# Patient Record
Sex: MALE | Race: WHITE | NOT HISPANIC OR LATINO | URBAN - METROPOLITAN AREA
[De-identification: names, ages, dates, MRNs, and addresses within clinical notes are randomized per-mention and may not be internally consistent; named-entity substitution may affect disease eponyms.]

---

## 2017-06-13 ENCOUNTER — EMERGENCY (EMERGENCY)
Facility: HOSPITAL | Age: 67
LOS: 1 days | Discharge: HOME | End: 2017-06-13
Attending: EMERGENCY MEDICINE | Admitting: EMERGENCY MEDICINE
Payer: MEDICARE

## 2017-06-13 VITALS
DIASTOLIC BLOOD PRESSURE: 78 MMHG | OXYGEN SATURATION: 99 % | SYSTOLIC BLOOD PRESSURE: 124 MMHG | HEART RATE: 70 BPM | TEMPERATURE: 98 F | RESPIRATION RATE: 18 BRPM

## 2017-06-13 LAB
ALBUMIN SERPL ELPH-MCNC: 4.2 G/DL — SIGNIFICANT CHANGE UP (ref 3.3–5)
ALP SERPL-CCNC: 78 U/L — SIGNIFICANT CHANGE UP (ref 40–120)
ALT FLD-CCNC: 14 U/L RC — SIGNIFICANT CHANGE UP (ref 10–45)
ANION GAP SERPL CALC-SCNC: 17 MMOL/L — SIGNIFICANT CHANGE UP (ref 5–17)
AST SERPL-CCNC: 19 U/L — SIGNIFICANT CHANGE UP (ref 10–40)
BASE EXCESS BLDV CALC-SCNC: 5.1 MMOL/L — HIGH (ref -2–2)
BILIRUB SERPL-MCNC: 0.6 MG/DL — SIGNIFICANT CHANGE UP (ref 0.2–1.2)
BUN SERPL-MCNC: 15 MG/DL — SIGNIFICANT CHANGE UP (ref 7–23)
CA-I SERPL-SCNC: 1.2 MMOL/L — SIGNIFICANT CHANGE UP (ref 1.12–1.3)
CALCIUM SERPL-MCNC: 9.7 MG/DL — SIGNIFICANT CHANGE UP (ref 8.4–10.5)
CHLORIDE BLDV-SCNC: 101 MMOL/L — SIGNIFICANT CHANGE UP (ref 96–108)
CHLORIDE SERPL-SCNC: 99 MMOL/L — SIGNIFICANT CHANGE UP (ref 96–108)
CK MB CFR SERPL CALC: 1.6 NG/ML — SIGNIFICANT CHANGE UP (ref 0–6.7)
CK SERPL-CCNC: 73 U/L — SIGNIFICANT CHANGE UP (ref 30–200)
CO2 BLDV-SCNC: 29 MMOL/L — SIGNIFICANT CHANGE UP (ref 22–30)
CO2 SERPL-SCNC: 24 MMOL/L — SIGNIFICANT CHANGE UP (ref 22–31)
CREAT SERPL-MCNC: 0.93 MG/DL — SIGNIFICANT CHANGE UP (ref 0.5–1.3)
GAS PNL BLDV: 136 MMOL/L — SIGNIFICANT CHANGE UP (ref 136–145)
GAS PNL BLDV: SIGNIFICANT CHANGE UP
GLUCOSE BLDV-MCNC: 108 MG/DL — HIGH (ref 70–99)
GLUCOSE SERPL-MCNC: 113 MG/DL — HIGH (ref 70–99)
HCO3 BLDV-SCNC: 28 MMOL/L — SIGNIFICANT CHANGE UP (ref 21–29)
HCT VFR BLD CALC: 42.2 % — SIGNIFICANT CHANGE UP (ref 39–50)
HCT VFR BLDA CALC: 44 % — SIGNIFICANT CHANGE UP (ref 39–50)
HGB BLD CALC-MCNC: 14.3 G/DL — SIGNIFICANT CHANGE UP (ref 13–17)
HGB BLD-MCNC: 14.5 G/DL — SIGNIFICANT CHANGE UP (ref 13–17)
LACTATE BLDV-MCNC: 2 MMOL/L — SIGNIFICANT CHANGE UP (ref 0.7–2)
MCHC RBC-ENTMCNC: 33.1 PG — SIGNIFICANT CHANGE UP (ref 27–34)
MCHC RBC-ENTMCNC: 34.3 GM/DL — SIGNIFICANT CHANGE UP (ref 32–36)
MCV RBC AUTO: 96.7 FL — SIGNIFICANT CHANGE UP (ref 80–100)
NT-PROBNP SERPL-SCNC: 252 PG/ML — SIGNIFICANT CHANGE UP (ref 0–300)
PCO2 BLDV: 36 MMHG — SIGNIFICANT CHANGE UP (ref 35–50)
PH BLDV: 7.5 — HIGH (ref 7.35–7.45)
PLATELET # BLD AUTO: 279 K/UL — SIGNIFICANT CHANGE UP (ref 150–400)
PO2 BLDV: 54 MMHG — HIGH (ref 25–45)
POTASSIUM BLDV-SCNC: 3.6 MMOL/L — SIGNIFICANT CHANGE UP (ref 3.5–5)
POTASSIUM SERPL-MCNC: 3.9 MMOL/L — SIGNIFICANT CHANGE UP (ref 3.5–5.3)
POTASSIUM SERPL-SCNC: 3.9 MMOL/L — SIGNIFICANT CHANGE UP (ref 3.5–5.3)
PROT SERPL-MCNC: 7.6 G/DL — SIGNIFICANT CHANGE UP (ref 6–8.3)
RBC # BLD: 4.37 M/UL — SIGNIFICANT CHANGE UP (ref 4.2–5.8)
RBC # FLD: 11.5 % — SIGNIFICANT CHANGE UP (ref 10.3–14.5)
SAO2 % BLDV: 90 % — HIGH (ref 67–88)
SODIUM SERPL-SCNC: 140 MMOL/L — SIGNIFICANT CHANGE UP (ref 135–145)
TROPONIN T SERPL-MCNC: <0.01 NG/ML — SIGNIFICANT CHANGE UP (ref 0–0.06)
TROPONIN T SERPL-MCNC: <0.01 NG/ML — SIGNIFICANT CHANGE UP (ref 0–0.06)
WBC # BLD: 10.2 K/UL — SIGNIFICANT CHANGE UP (ref 3.8–10.5)
WBC # FLD AUTO: 10.2 K/UL — SIGNIFICANT CHANGE UP (ref 3.8–10.5)

## 2017-06-13 PROCEDURE — 99220: CPT | Mod: 25

## 2017-06-13 PROCEDURE — 71020: CPT | Mod: 26

## 2017-06-13 PROCEDURE — 93010 ELECTROCARDIOGRAM REPORT: CPT

## 2017-06-13 RX ORDER — IPRATROPIUM/ALBUTEROL SULFATE 18-103MCG
3 AEROSOL WITH ADAPTER (GRAM) INHALATION EVERY 6 HOURS
Qty: 0 | Refills: 0 | Status: DISCONTINUED | OUTPATIENT
Start: 2017-06-13 | End: 2017-06-17

## 2017-06-13 RX ORDER — IPRATROPIUM/ALBUTEROL SULFATE 18-103MCG
3 AEROSOL WITH ADAPTER (GRAM) INHALATION ONCE
Qty: 0 | Refills: 0 | Status: COMPLETED | OUTPATIENT
Start: 2017-06-13 | End: 2017-06-13

## 2017-06-13 RX ORDER — ASPIRIN/CALCIUM CARB/MAGNESIUM 324 MG
325 TABLET ORAL ONCE
Qty: 0 | Refills: 0 | Status: COMPLETED | OUTPATIENT
Start: 2017-06-13 | End: 2017-06-13

## 2017-06-13 RX ADMIN — Medication 325 MILLIGRAM(S): at 16:36

## 2017-06-13 RX ADMIN — Medication 1 MILLIGRAM(S): at 15:24

## 2017-06-13 RX ADMIN — Medication 50 MILLIGRAM(S): at 13:42

## 2017-06-13 RX ADMIN — Medication 3 MILLILITER(S): at 11:38

## 2017-06-13 NOTE — ED CDU PROVIDER NOTE - PROGRESS NOTE DETAILS
spoke to Dr. Elliot Pantoja- ok with plan with cdu, tele monitoring, serial CE and echo and outpt follow up this week in office. -RITA Rapp CDU NOTE PA Tanya: pt resting comfortably, feels well without complaint. no dyspnea/sob/cp. NAD VSS. no events on tele. CDU NOTE RITA Martínez: pt asleep. NAD VSS. no events on tele. CDU NOTE RITA Martínez: pt resting comfortably, feels well without complaint. NAD VSS. no events on tele. Pt comfortable. No complaints. Vital signs stable. Will continue to observe and reassess. Awaiting  echo. -Vanessa Robb PA-C Pt comfortable. No complaints. Vital signs stable.   echo normal. Will plan for d/c home. d/w Dr. Martin. -Vanessa Robb PA-C I have personally performed a face to face diagnostic evaluation on this patient.  I have reviewed the ACP note and agree with the history, exam, and plan of care, except as noted.  History and Exam by me shows   pt feeling better with no SOB at this time.  ECHO - normal.  Pt advised to use ProAir QID and take steroid next 48 hours along with Breo.  Pt also advised to f/u with PMD in the next week.  Pt stable for D/C.

## 2017-06-13 NOTE — ED ADULT NURSE NOTE - OBJECTIVE STATEMENT
Pt presents to the ER with SOB.  No SOB now.  PT has a hx of COPD.  Lung sounds clear. pt who has copd came in with some diff breathing and wheezing. pt is also very anxious. on assessment a and o x 3 lungs clear abd soft non tender no swelling in extremities no n/v/d/ no fevers, trying to do breathing treatments but gets anxious, no other complaints

## 2017-06-13 NOTE — ED CDU PROVIDER NOTE - PLAN OF CARE
1. Stay hydrated.  2. Continue Current Home Medications. Use Albuterol nebulizer every 4-6hrs for difficulty breathing as needed. Take Prednisone 50mg daily for 4 days.   3. Follow up with your PCP Dr. Elliot Pantoja and your Pulmonologist Dr. Tree Amaro in 1-2 days (Bring printed results to your doctor visit).  4. Return if symptoms, worsen, fever, weakness, chest pain, difficulty breathing, dizziness and all other concerns. 1. Stay hydrated.  2. Continue Current Home Medications. Take Prednisone 50mg daily for 4 days. Continue your current home medications.  3. Follow up with your PCP Dr. Elliot Pantoja and your Pulmonologist Dr. Tree Amaro in 2-3 days (Bring printed results to your doctor visit).  4. Return to the ED if symptoms worsen, or if you develop any fever, weakness, chest pain, difficulty breathing, dizziness, or any other concerns. 1. Stay hydrated.  2. Continue Current Home Medications. Take Prednisone 50mg daily for 2 more days. Continue your current home medications.  3. Follow up with your PCP Dr. Elliot Pantoja and your Pulmonologist Dr. Tree Amaro in 2-3 days (Bring printed results to your doctor visit).  4. Return to the ED if symptoms worsen, or if you develop any fever, weakness, chest pain, difficulty breathing, dizziness, or any other concerns.

## 2017-06-13 NOTE — ED ADULT NURSE REASSESSMENT NOTE - NS ED NURSE REASSESS COMMENT FT1
report taken from Bertha MAGANA
Pt received from CHINO Cobian. Pt oriented to CDU & plan of care was discussed. No complaints of chest pain, SOB, dizziness or palpitations. Pt denies any SOB or difficulty breathing. Bilateral lung sounds clear. Pt states he feels better. Safety & comfort measures maintained. Call bell in reach. Will continue to monitor.

## 2017-06-13 NOTE — ED CDU PROVIDER NOTE - MEDICAL DECISION MAKING DETAILS
TELE  -[facility]  -[facility] EVAL  -ECHO  -SEND HOME WITH STEROIDS FOR COPD IF CARDIAC W/U NEG

## 2017-06-13 NOTE — ED CDU PROVIDER NOTE - OBJECTIVE STATEMENT
65 yo male wit PMH of HTN, herniated disks, radiculopathy, COPD on Brio and Proair, not on O2, no hospitalization, steroids or intubations presents with 2 dyas of SOB, orthopnea, mild le edema and HEALY. No hx of chf. No URI sx. No travel, sick cotnacts, abx. No cp. No Bleeeding from anywhere or hx of anemia. Former smoker.

## 2017-06-13 NOTE — ED PROVIDER NOTE - PMH
COPD (chronic obstructive pulmonary disease)    HLD (hyperlipidemia)    HTN (hypertension)    Spondylolisthesis

## 2017-06-13 NOTE — ED PROVIDER NOTE - OBJECTIVE STATEMENT
67 yo male wit PMH of HTN, herniated disks, radiculopathy, COPD on Brio and Proair, not on O2, no hospitalization, steroids or intubations presents with 2 dyas of SOB, orthopnea, mild le edema and HEALY. No hx of chf. No URI sx. No travel, sick cotnacts, abx. No cp. No Bleeeding from anywhere or hx of anemia. Former smoker. On exam, AVSS, secreased BS on left side, no wheezing, s1, s2, rrr, no jvd, s1,s 2, soft, nt nd no r/g/r, Mild ankle edema, no calf ttp.

## 2017-06-14 VITALS
SYSTOLIC BLOOD PRESSURE: 124 MMHG | OXYGEN SATURATION: 96 % | DIASTOLIC BLOOD PRESSURE: 86 MMHG | HEART RATE: 67 BPM | TEMPERATURE: 98 F | RESPIRATION RATE: 17 BRPM

## 2017-06-14 PROCEDURE — 99284 EMERGENCY DEPT VISIT MOD MDM: CPT | Mod: 25

## 2017-06-14 PROCEDURE — 82550 ASSAY OF CK (CPK): CPT

## 2017-06-14 PROCEDURE — 93306 TTE W/DOPPLER COMPLETE: CPT | Mod: 26

## 2017-06-14 PROCEDURE — G0378: CPT

## 2017-06-14 PROCEDURE — 82435 ASSAY OF BLOOD CHLORIDE: CPT

## 2017-06-14 PROCEDURE — 94640 AIRWAY INHALATION TREATMENT: CPT

## 2017-06-14 PROCEDURE — 93306 TTE W/DOPPLER COMPLETE: CPT

## 2017-06-14 PROCEDURE — 85027 COMPLETE CBC AUTOMATED: CPT

## 2017-06-14 PROCEDURE — 84295 ASSAY OF SERUM SODIUM: CPT

## 2017-06-14 PROCEDURE — 82553 CREATINE MB FRACTION: CPT

## 2017-06-14 PROCEDURE — 82803 BLOOD GASES ANY COMBINATION: CPT

## 2017-06-14 PROCEDURE — 93005 ELECTROCARDIOGRAM TRACING: CPT

## 2017-06-14 PROCEDURE — 82330 ASSAY OF CALCIUM: CPT

## 2017-06-14 PROCEDURE — 82947 ASSAY GLUCOSE BLOOD QUANT: CPT

## 2017-06-14 PROCEDURE — 84484 ASSAY OF TROPONIN QUANT: CPT

## 2017-06-14 PROCEDURE — 71046 X-RAY EXAM CHEST 2 VIEWS: CPT

## 2017-06-14 PROCEDURE — 85014 HEMATOCRIT: CPT

## 2017-06-14 PROCEDURE — 83605 ASSAY OF LACTIC ACID: CPT

## 2017-06-14 PROCEDURE — 83880 ASSAY OF NATRIURETIC PEPTIDE: CPT

## 2017-06-14 PROCEDURE — 99217: CPT

## 2017-06-14 PROCEDURE — 80053 COMPREHEN METABOLIC PANEL: CPT

## 2017-06-14 PROCEDURE — 84132 ASSAY OF SERUM POTASSIUM: CPT

## 2017-06-14 RX ADMIN — Medication 50 MILLIGRAM(S): at 11:54

## 2017-09-20 ENCOUNTER — APPOINTMENT (OUTPATIENT)
Dept: SURGERY | Facility: CLINIC | Age: 67
End: 2017-09-20
Payer: MEDICARE

## 2017-09-20 VITALS
RESPIRATION RATE: 16 BRPM | OXYGEN SATURATION: 99 % | HEIGHT: 68.5 IN | TEMPERATURE: 97.9 F | DIASTOLIC BLOOD PRESSURE: 91 MMHG | WEIGHT: 120 LBS | HEART RATE: 61 BPM | BODY MASS INDEX: 17.98 KG/M2 | SYSTOLIC BLOOD PRESSURE: 150 MMHG

## 2017-09-20 DIAGNOSIS — K40.90 UNILATERAL INGUINAL HERNIA, W/OUT OBSTRUCTION OR GANGRENE, NOT SPECIFIED AS RECURRENT: ICD-10-CM

## 2017-09-20 DIAGNOSIS — Z87.19 PERSONAL HISTORY OF OTHER DISEASES OF THE DIGESTIVE SYSTEM: ICD-10-CM

## 2017-09-20 DIAGNOSIS — T14.8 OTHER INJURY OF UNSPECIFIED BODY REGION: ICD-10-CM

## 2017-09-20 PROCEDURE — 99205 OFFICE O/P NEW HI 60 MIN: CPT

## 2017-09-20 RX ORDER — UMECLIDINIUM BROMIDE AND VILANTEROL TRIFENATATE 62.5; 25 UG/1; UG/1
62.5-25 POWDER RESPIRATORY (INHALATION)
Refills: 0 | Status: ACTIVE | COMMUNITY

## 2017-11-15 ENCOUNTER — EMERGENCY (EMERGENCY)
Facility: HOSPITAL | Age: 67
LOS: 1 days | Discharge: HOME | End: 2017-11-15
Attending: EMERGENCY MEDICINE | Admitting: EMERGENCY MEDICINE
Payer: MEDICARE

## 2017-11-15 VITALS
RESPIRATION RATE: 17 BRPM | TEMPERATURE: 98 F | DIASTOLIC BLOOD PRESSURE: 105 MMHG | HEART RATE: 70 BPM | SYSTOLIC BLOOD PRESSURE: 189 MMHG | OXYGEN SATURATION: 96 %

## 2017-11-15 VITALS
DIASTOLIC BLOOD PRESSURE: 99 MMHG | RESPIRATION RATE: 19 BRPM | OXYGEN SATURATION: 99 % | SYSTOLIC BLOOD PRESSURE: 140 MMHG | HEART RATE: 65 BPM

## 2017-11-15 PROCEDURE — 99284 EMERGENCY DEPT VISIT MOD MDM: CPT

## 2017-11-15 PROCEDURE — 99283 EMERGENCY DEPT VISIT LOW MDM: CPT

## 2017-11-15 RX ORDER — HYDROCHLOROTHIAZIDE 25 MG
25 TABLET ORAL ONCE
Qty: 0 | Refills: 0 | Status: COMPLETED | OUTPATIENT
Start: 2017-11-15 | End: 2017-11-15

## 2017-11-15 RX ORDER — ACETAMINOPHEN 500 MG
650 TABLET ORAL ONCE
Qty: 0 | Refills: 0 | Status: COMPLETED | OUTPATIENT
Start: 2017-11-15 | End: 2017-11-15

## 2017-11-15 RX ADMIN — Medication 25 MILLIGRAM(S): at 16:20

## 2017-11-15 NOTE — ED ADULT NURSE NOTE - NS ED NURSE RECORD ANOTHER HT AND WT
Pt states she's felt sick all week with symptoms consisting of weakness and dizziness and nausea. Pt denies chest pain. Pt had dry heaves earlier in the week, no emesis. Pt states 1400 today, she began with numbness across her entire chest.  Again, denies chest pain, denies shortness of breath. States she's lightheaded.
No

## 2017-11-15 NOTE — ED PROVIDER NOTE - PLAN OF CARE
Please start taking the blood pressure pill hydrochlorothiazide as prescribed.     Please follow up with your regular doctor in 1 week. Return to the ED if you develop severe chest pain, headache, trouble breathing or changes in urinary habits.

## 2017-11-15 NOTE — ED PROVIDER NOTE - MEDICAL DECISION MAKING DETAILS
Attending MD Antonio: 66M with HTN and COPD presenting with hypertension, SBPs to 180s/100s, no symptoms. No s/s malignant hypertension in this patient, will discuss case with patient's PMD, check BMP for renal function and likely discharge

## 2017-11-15 NOTE — ED PROVIDER NOTE - CARE PLAN
Principal Discharge DX:	Essential hypertension Principal Discharge DX:	Essential hypertension  Instructions for follow-up, activity and diet:	Please start taking the blood pressure pill hydrochlorothiazide as prescribed.     Please follow up with your regular doctor in 1 week. Return to the ED if you develop severe chest pain, headache, trouble breathing or changes in urinary habits.

## 2017-11-15 NOTE — ED ADULT NURSE NOTE - OBJECTIVE STATEMENT
Patient   is  alert  and  oriented x3.  Color is good  and  skin warm to touch.  He  denies  headache , nausea  or  dizziness.

## 2017-11-15 NOTE — ED PROVIDER NOTE - MUSCULOSKELETAL, MLM
Spine appears normal, range of motion is not limited, no muscle or joint tenderness. No peripheral edema.

## 2017-11-15 NOTE — ED PROVIDER NOTE - PROGRESS NOTE DETAILS
Attending MD Antonio: discussed case with Dr. Kay, he recommends starting HCTZ 25mg qd and we will have patient follow up in 1 week for BP recheck

## 2017-11-15 NOTE — ED PROVIDER NOTE - OBJECTIVE STATEMENT
66 year old male with pmhx of COPD, hyperlipidemia, Hypertension, spondylolisthesis presents with high BP at PMD's office. Was instructed to monitor BP at home and was in the 180/100's, after taking dose of Amlodipine 10mg and sent to ER. No chest pain or tightness. No headaches. Denies any other symptoms at this time.  PMD- Cj Kay

## 2019-01-19 ENCOUNTER — TRANSCRIPTION ENCOUNTER (OUTPATIENT)
Age: 69
End: 2019-01-19

## 2019-01-19 ENCOUNTER — OUTPATIENT (OUTPATIENT)
Dept: OUTPATIENT SERVICES | Facility: HOSPITAL | Age: 69
LOS: 1 days | Discharge: HOME | End: 2019-01-19

## 2019-01-19 DIAGNOSIS — C34.90 MALIGNANT NEOPLASM OF UNSPECIFIED PART OF UNSPECIFIED BRONCHUS OR LUNG: ICD-10-CM

## 2019-01-19 PROBLEM — M43.10 SPONDYLOLISTHESIS, SITE UNSPECIFIED: Chronic | Status: ACTIVE | Noted: 2017-06-13

## 2019-01-19 PROBLEM — E78.5 HYPERLIPIDEMIA, UNSPECIFIED: Chronic | Status: ACTIVE | Noted: 2017-06-13

## 2019-01-19 PROBLEM — J44.9 CHRONIC OBSTRUCTIVE PULMONARY DISEASE, UNSPECIFIED: Chronic | Status: ACTIVE | Noted: 2017-06-13

## 2019-01-19 PROBLEM — I10 ESSENTIAL (PRIMARY) HYPERTENSION: Chronic | Status: ACTIVE | Noted: 2017-06-13

## 2019-01-23 ENCOUNTER — RESULT REVIEW (OUTPATIENT)
Age: 69
End: 2019-01-23

## 2019-01-23 ENCOUNTER — APPOINTMENT (OUTPATIENT)
Dept: HEMATOLOGY ONCOLOGY | Facility: CLINIC | Age: 69
End: 2019-01-23
Payer: MEDICARE

## 2019-01-23 DIAGNOSIS — Z87.891 PERSONAL HISTORY OF NICOTINE DEPENDENCE: ICD-10-CM

## 2019-01-23 DIAGNOSIS — F12.90 CANNABIS USE, UNSPECIFIED, UNCOMPLICATED: ICD-10-CM

## 2019-01-23 LAB
BASOPHILS # BLD AUTO: 0.1 K/UL — SIGNIFICANT CHANGE UP (ref 0–0.2)
BASOPHILS NFR BLD AUTO: 0.7 % — SIGNIFICANT CHANGE UP (ref 0–2)
EOSINOPHIL # BLD AUTO: 0.3 K/UL — SIGNIFICANT CHANGE UP (ref 0–0.5)
EOSINOPHIL NFR BLD AUTO: 2.4 % — SIGNIFICANT CHANGE UP (ref 0–6)
HCT VFR BLD CALC: 44.8 % — SIGNIFICANT CHANGE UP (ref 39–50)
HGB BLD-MCNC: 14.5 G/DL — SIGNIFICANT CHANGE UP (ref 13–17)
LYMPHOCYTES # BLD AUTO: 2.2 K/UL — SIGNIFICANT CHANGE UP (ref 1–3.3)
LYMPHOCYTES # BLD AUTO: 20.5 % — SIGNIFICANT CHANGE UP (ref 13–44)
MCHC RBC-ENTMCNC: 30.8 PG — SIGNIFICANT CHANGE UP (ref 27–34)
MCHC RBC-ENTMCNC: 32.5 G/DL — SIGNIFICANT CHANGE UP (ref 32–36)
MCV RBC AUTO: 94.7 FL — SIGNIFICANT CHANGE UP (ref 80–100)
MONOCYTES # BLD AUTO: 1 K/UL — HIGH (ref 0–0.9)
MONOCYTES NFR BLD AUTO: 9.1 % — SIGNIFICANT CHANGE UP (ref 2–14)
NEUTROPHILS # BLD AUTO: 7.4 K/UL — SIGNIFICANT CHANGE UP (ref 1.8–7.4)
NEUTROPHILS NFR BLD AUTO: 67.3 % — SIGNIFICANT CHANGE UP (ref 43–77)
PLATELET # BLD AUTO: 455 K/UL — HIGH (ref 150–400)
RBC # BLD: 4.73 M/UL — SIGNIFICANT CHANGE UP (ref 4.2–5.8)
RBC # FLD: 11.6 % — SIGNIFICANT CHANGE UP (ref 10.3–14.5)
WBC # BLD: 11 K/UL — HIGH (ref 3.8–10.5)
WBC # FLD AUTO: 11 K/UL — HIGH (ref 3.8–10.5)

## 2019-01-23 PROCEDURE — 99205 OFFICE O/P NEW HI 60 MIN: CPT

## 2019-01-23 NOTE — REVIEW OF SYSTEMS
[Fatigue] : fatigue [Recent Change In Weight] : ~T recent weight change [Nosebleeds] : nosebleeds [Lower Ext Edema] : lower extremity edema [Shortness Of Breath] : shortness of breath [Cough] : cough [Negative] : Allergic/Immunologic

## 2019-01-23 NOTE — ASSESSMENT
[FreeTextEntry1] : 67 yo with newly discovered lung mass\par Need PET/CT \par Had recent MRI- no symptoms\par Will refer to pulm for bronch and bx.\par Labs today including PT/PTT\par OV in 3 weeks

## 2019-01-23 NOTE — CONSULT LETTER
[Dear  ___] : Dear  [unfilled], [Consult Letter:] : I had the pleasure of evaluating your patient, [unfilled]. [Please see my note below.] : Please see my note below. [Consult Closing:] : Thank you very much for allowing me to participate in the care of this patient.  If you have any questions, please do not hesitate to contact me. [Sincerely,] : Sincerely, [FreeTextEntry2] : Dr. Michael Soto [FreeTextEntry3] : Milka Rodgers MD\par \par  [DrSharri  ___] : Dr. CHESTER [DrSharri ___] : Dr. CHESTER

## 2019-01-23 NOTE — PHYSICAL EXAM
[Restricted in physically strenuous activity but ambulatory and able to carry out work of a light or sedentary nature] : Status 1- Restricted in physically strenuous activity but ambulatory and able to carry out work of a light or sedentary nature, e.g., light house work, office work [Thin] : thin [Normal] : affect appropriate [de-identified] : ecchymoses at IV site, hyperpigmented lesion on lower lip.

## 2019-01-23 NOTE — HISTORY OF PRESENT ILLNESS
[de-identified] : Mr. Soto is a pleasant 69 yo m with recently discovered lung mass here for consult visit. \par Pt who is very active, prior athlete, ex-, with hx of anxiety, HTN, ex-smoker (quit 2016), prior marijuana use, some occupational exposure to toxic chemicals, developed dyspnea in 2016, was diagnosed with COPD and is under the care of pulmonologists. Around end of September, developed hoarseness of voice which was thought to be laryngitis- was sent to ENT by domestic partner who is a speech pathologists. He was given medrol pack which did not help and then referred for CT chest which showed left upper lobe mass measuring 6.6 cm as well as hilar/mediastinal adenopathy and b/l nodules consistent with mets. He also had MRI brain without any contrast in November done for headache showed microvascular changes. Headaches have since resolved.

## 2019-01-25 ENCOUNTER — OTHER (OUTPATIENT)
Age: 69
End: 2019-01-25

## 2019-01-28 ENCOUNTER — APPOINTMENT (OUTPATIENT)
Dept: PULMONOLOGY | Facility: CLINIC | Age: 69
End: 2019-01-28
Payer: MEDICARE

## 2019-01-28 ENCOUNTER — FORM ENCOUNTER (OUTPATIENT)
Age: 69
End: 2019-01-28

## 2019-01-28 ENCOUNTER — APPOINTMENT (OUTPATIENT)
Dept: THORACIC SURGERY | Facility: CLINIC | Age: 69
End: 2019-01-28

## 2019-01-28 VITALS
TEMPERATURE: 99.1 F | OXYGEN SATURATION: 94 % | WEIGHT: 129 LBS | DIASTOLIC BLOOD PRESSURE: 82 MMHG | HEART RATE: 72 BPM | RESPIRATION RATE: 16 BRPM | BODY MASS INDEX: 19.33 KG/M2 | HEIGHT: 68.5 IN | SYSTOLIC BLOOD PRESSURE: 122 MMHG

## 2019-01-28 DIAGNOSIS — E78.5 HYPERLIPIDEMIA, UNSPECIFIED: ICD-10-CM

## 2019-01-28 DIAGNOSIS — Z80.0 FAMILY HISTORY OF MALIGNANT NEOPLASM OF DIGESTIVE ORGANS: ICD-10-CM

## 2019-01-28 DIAGNOSIS — Z87.39 PERSONAL HISTORY OF OTHER DISEASES OF THE MUSCULOSKELETAL SYSTEM AND CONNECTIVE TISSUE: ICD-10-CM

## 2019-01-28 PROCEDURE — 99203 OFFICE O/P NEW LOW 30 MIN: CPT

## 2019-01-28 RX ORDER — ACETAMINOPHEN 325 MG/1
TABLET, FILM COATED ORAL
Refills: 0 | Status: ACTIVE | COMMUNITY

## 2019-01-29 ENCOUNTER — APPOINTMENT (OUTPATIENT)
Dept: NUCLEAR MEDICINE | Facility: IMAGING CENTER | Age: 69
End: 2019-01-29
Payer: MEDICARE

## 2019-01-29 ENCOUNTER — OTHER (OUTPATIENT)
Age: 69
End: 2019-01-29

## 2019-01-29 ENCOUNTER — OUTPATIENT (OUTPATIENT)
Dept: OUTPATIENT SERVICES | Facility: HOSPITAL | Age: 69
LOS: 1 days | End: 2019-01-29
Payer: MEDICARE

## 2019-01-29 DIAGNOSIS — R91.8 OTHER NONSPECIFIC ABNORMAL FINDING OF LUNG FIELD: ICD-10-CM

## 2019-01-29 PROCEDURE — A9552: CPT

## 2019-01-29 PROCEDURE — 78815 PET IMAGE W/CT SKULL-THIGH: CPT | Mod: 26,PI

## 2019-01-29 PROCEDURE — 78815 PET IMAGE W/CT SKULL-THIGH: CPT

## 2019-01-30 NOTE — REVIEW OF SYSTEMS
[Postnasal Drip] : postnasal drip [Cough] : cough [Sputum] : sputum  [Dyspnea] : dyspnea [Pleuritic Pain] : pleuritic pain [Palpitations] : palpitations [Edema] : ~T edema was present [Numbness] : numbness [Anxiety] : anxiety [Negative] : Hematologic [Fever] : no fever [Chills] : no chills [Fatigue] : no fatigue [Poor Appetite] : normal appetite  [Recent Wt Gain (___ Lbs)] : no recent weight gain [Recent Wt Loss (___ Lbs)] : no recent weight loss [Chest Tightness] : no chest tightness [Wheezing] : no wheezing [PND] : no PND [Orthopnea] : no orthopnea [Hay Fever] : no hay fever [Itchy Eyes] : no itching of ~T the eyes [Immunocompromised] : no immunocompromised disease [Heartburn] : no heartburn [Reflux] : no reflux [Dysphagia] : no dysphagia [Nausea] : no nausea [FreeTextEntry2] : on zyrtec [FreeTextEntry8] : chronic, sputum clear, one episode of hemoptysis possible ( drank cherry soda), Dyspnea: worse over last few weeks?, dull chest pain at rest, slightly pleuritic in substernal area [FreeTextEntry9] : edema after norvasc [FreeTextEntry7] : abd hernia under control [FreeTextEntry6] : numbness of fingers and toes up to hips from cervical spine, left knee arthroscopy [de-identified] : fingers and legs

## 2019-01-30 NOTE — ASSESSMENT
[FreeTextEntry1] : 68 yom with chest CT changes suspicious for malignancy.\par \par Diagnosis can be achieved with bronchoscopic procedure with addition of ebus for  mediastinal staging. Small multiple bilateral nodule may be related to mass, but may be inflammatory in nature as well.\par Bronchoscopy as well as alternative procedure such as CT guided TTNA of mass was discussed with patient.\par My recommendation is to proceed with bronchoscopy in absence of PET scan. However, patient is scheduled for PET and if PET showed PET avid lesion other that lesion in SHENA, CT guided TTNA of mass maybe a diagnostic alternative assuming that PET positive lesion will represent metastatic disease.\par At this point, patient is very hesitant to undergo any procedure which require deep sedation despite prolonged discussion with explanation of low risk of bronchoscopic procedures.\par I'll await for PET results and will discuss case at TB.\par We'll se up bronch versus CT guided TTNA of mass based on PET results and TB recommendations.

## 2019-01-30 NOTE — PHYSICAL EXAM
[Well Groomed] : well groomed [General Appearance - In No Acute Distress] : no acute distress [Normal Oropharynx] : normal oropharynx [Neck Appearance] : the appearance of the neck was normal [Neck Cervical Mass (___cm)] : no neck mass was observed [Heart Rate And Rhythm] : heart rate and rhythm were normal [Heart Sounds] : normal S1 and S2 [Murmurs] : no murmurs present [] : no respiratory distress [Respiration, Rhythm And Depth] : normal respiratory rhythm and effort [Auscultation Breath Sounds / Voice Sounds] : lungs were clear to auscultation bilaterally [Bowel Sounds] : normal bowel sounds [Abdomen Soft] : soft [Abdomen Tenderness] : non-tender [Abnormal Walk] : normal gait [Nail Clubbing] : no clubbing of the fingernails [Cyanosis, Localized] : no localized cyanosis [Skin Color & Pigmentation] : normal skin color and pigmentation [Skin Turgor] : normal skin turgor [No Focal Deficits] : no focal deficits [Oriented To Time, Place, And Person] : oriented to person, place, and time [Affect] : the affect was normal [FreeTextEntry1] : no wheezing

## 2019-01-30 NOTE — HISTORY OF PRESENT ILLNESS
[FreeTextEntry1] : 68 yom with history of COPD, hypertension and dyslipidemia referred for evaluation for diagnostic procedure/bronchoscopy.\par \par Patient is former smoker, smoke lightly, has approximately 5py smoking history. His second hand smoking exposure, however is much more significant. In addition he also reports inhalation exposure to chemical such as benzene.\par He was diagnosed with COPD by Dr. Amaro, and has been treated with anoro. He started to feel more dyspneic over the last few month and 2 month ago reports voice change.\par His work up includes chest and neck CT, which revealed SHENA mass with mediastinal and hilar lymphadenopathy, tiny bilateral nodules and left vocal cord paralysis.\par His appetite is normal, his weight sis stable and his physical performance is ECOG 1.\par

## 2019-02-04 LAB
ALBUMIN SERPL ELPH-MCNC: 4.2 G/DL
ALP BLD-CCNC: 103 U/L
ALT SERPL-CCNC: 18 U/L
ANION GAP SERPL CALC-SCNC: 14 MMOL/L
APTT BLD: 28.9 SEC
AST SERPL-CCNC: 19 U/L
BILIRUB SERPL-MCNC: 0.6 MG/DL
BUN SERPL-MCNC: 18 MG/DL
CALCIUM SERPL-MCNC: 10.4 MG/DL
CEA SERPL-MCNC: 11.2 NG/ML
CHLORIDE SERPL-SCNC: 101 MMOL/L
CO2 SERPL-SCNC: 28 MMOL/L
CREAT SERPL-MCNC: 1.04 MG/DL
GLUCOSE SERPL-MCNC: 110 MG/DL
HAV IGM SER QL: NONREACTIVE
HBV CORE IGM SER QL: NONREACTIVE
HBV SURFACE AG SER QL: NONREACTIVE
HCV AB SER QL: NONREACTIVE
HCV S/CO RATIO: 0.26 S/CO
INR PPP: 1.12 RATIO
MAGNESIUM SERPL-MCNC: 1.9 MG/DL
POTASSIUM SERPL-SCNC: 4.6 MMOL/L
PROT SERPL-MCNC: 7.5 G/DL
PT BLD: 12.8 SEC
SODIUM SERPL-SCNC: 143 MMOL/L
TSH SERPL-ACNC: 2.48 UIU/ML

## 2019-02-13 ENCOUNTER — FORM ENCOUNTER (OUTPATIENT)
Age: 69
End: 2019-02-13

## 2019-02-13 ENCOUNTER — OUTPATIENT (OUTPATIENT)
Dept: OUTPATIENT SERVICES | Facility: HOSPITAL | Age: 69
LOS: 1 days | Discharge: HOME | End: 2019-02-13
Payer: MEDICARE

## 2019-02-13 DIAGNOSIS — C34.90 MALIGNANT NEOPLASM OF UNSPECIFIED PART OF UNSPECIFIED BRONCHUS OR LUNG: ICD-10-CM

## 2019-02-14 ENCOUNTER — RESULT REVIEW (OUTPATIENT)
Age: 69
End: 2019-02-14

## 2019-02-14 ENCOUNTER — APPOINTMENT (OUTPATIENT)
Dept: CT IMAGING | Facility: HOSPITAL | Age: 69
End: 2019-02-14

## 2019-02-14 ENCOUNTER — OUTPATIENT (OUTPATIENT)
Dept: OUTPATIENT SERVICES | Facility: HOSPITAL | Age: 69
LOS: 1 days | End: 2019-02-14
Payer: MEDICARE

## 2019-02-14 DIAGNOSIS — Z00.00 ENCOUNTER FOR GENERAL ADULT MEDICAL EXAMINATION WITHOUT ABNORMAL FINDINGS: ICD-10-CM

## 2019-02-14 DIAGNOSIS — R91.8 OTHER NONSPECIFIC ABNORMAL FINDING OF LUNG FIELD: ICD-10-CM

## 2019-02-14 PROCEDURE — 32405: CPT

## 2019-02-14 PROCEDURE — 88342 IMHCHEM/IMCYTCHM 1ST ANTB: CPT

## 2019-02-14 PROCEDURE — 77012 CT SCAN FOR NEEDLE BIOPSY: CPT | Mod: 26

## 2019-02-14 PROCEDURE — 88173 CYTOPATH EVAL FNA REPORT: CPT

## 2019-02-14 PROCEDURE — 77012 CT SCAN FOR NEEDLE BIOPSY: CPT

## 2019-02-14 PROCEDURE — 88173 CYTOPATH EVAL FNA REPORT: CPT | Mod: 26

## 2019-02-14 PROCEDURE — 88360 TUMOR IMMUNOHISTOCHEM/MANUAL: CPT

## 2019-02-14 PROCEDURE — 88172 CYTP DX EVAL FNA 1ST EA SITE: CPT

## 2019-02-14 PROCEDURE — 88360 TUMOR IMMUNOHISTOCHEM/MANUAL: CPT | Mod: 26

## 2019-02-14 PROCEDURE — 88305 TISSUE EXAM BY PATHOLOGIST: CPT

## 2019-02-14 PROCEDURE — 88341 IMHCHEM/IMCYTCHM EA ADD ANTB: CPT

## 2019-02-14 PROCEDURE — 88342 IMHCHEM/IMCYTCHM 1ST ANTB: CPT | Mod: 26,59

## 2019-02-14 PROCEDURE — 10009 FNA BX W/CT GDN 1ST LES: CPT

## 2019-02-14 PROCEDURE — 88305 TISSUE EXAM BY PATHOLOGIST: CPT | Mod: 26

## 2019-02-14 PROCEDURE — 88341 IMHCHEM/IMCYTCHM EA ADD ANTB: CPT | Mod: 26,59

## 2019-02-21 ENCOUNTER — APPOINTMENT (OUTPATIENT)
Dept: HEMATOLOGY ONCOLOGY | Facility: CLINIC | Age: 69
End: 2019-02-21
Payer: MEDICARE

## 2019-02-21 VITALS
RESPIRATION RATE: 16 BRPM | DIASTOLIC BLOOD PRESSURE: 72 MMHG | TEMPERATURE: 98 F | BODY MASS INDEX: 19.16 KG/M2 | WEIGHT: 127.87 LBS | OXYGEN SATURATION: 95 % | SYSTOLIC BLOOD PRESSURE: 120 MMHG | HEART RATE: 60 BPM

## 2019-02-21 PROCEDURE — 99215 OFFICE O/P EST HI 40 MIN: CPT

## 2019-02-22 NOTE — PHYSICAL EXAM
[Restricted in physically strenuous activity but ambulatory and able to carry out work of a light or sedentary nature] : Status 1- Restricted in physically strenuous activity but ambulatory and able to carry out work of a light or sedentary nature, e.g., light house work, office work [Thin] : thin [Normal] : affect appropriate [de-identified] : ecchymoses at IV site, hyperpigmented lesion on lower lip.

## 2019-02-22 NOTE — CONSULT LETTER
[Dear  ___] : Dear  [unfilled], [Consult Letter:] : I had the pleasure of evaluating your patient, [unfilled]. [Please see my note below.] : Please see my note below. [Consult Closing:] : Thank you very much for allowing me to participate in the care of this patient.  If you have any questions, please do not hesitate to contact me. [Sincerely,] : Sincerely, [DrSharri  ___] : Dr. CHESTER [DrSharri ___] : Dr. CHESTER [FreeTextEntry2] : Dr. Michael Soto [FreeTextEntry3] : Milka Rodgers MD\par \par

## 2019-02-22 NOTE — ASSESSMENT
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 PDL1 is pending. \par Given excellent PS and unknown PDL1 and absence of  mutations, recommend tx with carbo/pemetrexed and Keytruda triplet combo as per KEynote 189 study, \par Discussed regimen in detail with pt and his wife- including potential AEs \par Pt expressed understanding and consented to tx. \par We reiterated importance of timely reporting of AEs. \par OV in 3 weeks.  [Palliative] : Goals of care discussed with patient: Palliative

## 2019-02-22 NOTE — HISTORY OF PRESENT ILLNESS
[2 - Distress Level] : Distress Level: 2 [Patient given social work contact information and resource sheet] : Patient was given social work contact information and resource sheet [Disease: _____________________] : Disease: [unfilled] [T: ___] : T[unfilled] [N: ___] : N[unfilled] [M: ___] : M[unfilled] [AJCC Stage: ____] : AJCC Stage: [unfilled] [de-identified] : Mr. Soto is a pleasant 67 yo m with recently discovered lung mass here for consult visit. \par Pt who is very active, prior athlete, ex-, with hx of anxiety, HTN, ex-smoker (quit 2016), prior marijuana use, some occupational exposure to toxic chemicals, developed dyspnea in 2016, was diagnosed with COPD and is under the care of pulmonologists. Around end of September, developed hoarseness of voice which was thought to be laryngitis- was sent to ENT by domestic partner who is a speech pathologists. He was given medrol pack which did not help and then referred for CT chest which showed left upper lobe mass measuring 6.6 cm as well as hilar/mediastinal adenopathy and b/l nodules consistent with mets. He also had MRI brain without any contrast in November done for headache showed microvascular changes. Headaches have since resolved. Since last visit, h has seen Dr. Hernandez who discussed bronch with him but he declined. Hence, she referred him for CT guided bx of lung mass, which he had done a couple of weeks ago. 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 NGS and PDL1 are pending. He also saw Dr. Washington at [facility] for another opinion. 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 He has some anxiety but is ready to start tx. He has no new complaints.  [de-identified] : adeno ca

## 2019-02-25 RX ORDER — METOPROLOL TARTRATE 50 MG
1 TABLET ORAL
Qty: 0 | Refills: 0 | COMMUNITY

## 2019-02-25 RX ORDER — BENAZEPRIL HYDROCHLORIDE 40 MG/1
0 TABLET ORAL
Qty: 0 | Refills: 0 | COMMUNITY

## 2019-02-25 RX ORDER — METOPROLOL TARTRATE 50 MG
0 TABLET ORAL
Qty: 0 | Refills: 0 | COMMUNITY

## 2019-02-26 ENCOUNTER — LABORATORY RESULT (OUTPATIENT)
Age: 69
End: 2019-02-26

## 2019-02-26 ENCOUNTER — APPOINTMENT (OUTPATIENT)
Dept: INFUSION THERAPY | Facility: HOSPITAL | Age: 69
End: 2019-02-26

## 2019-02-26 ENCOUNTER — RESULT REVIEW (OUTPATIENT)
Age: 69
End: 2019-02-26

## 2019-02-26 LAB
BASOPHILS # BLD AUTO: 0.1 K/UL — SIGNIFICANT CHANGE UP (ref 0–0.2)
BASOPHILS NFR BLD AUTO: 0.5 % — SIGNIFICANT CHANGE UP (ref 0–2)
EOSINOPHIL # BLD AUTO: 0.3 K/UL — SIGNIFICANT CHANGE UP (ref 0–0.5)
EOSINOPHIL NFR BLD AUTO: 1.9 % — SIGNIFICANT CHANGE UP (ref 0–6)
HCT VFR BLD CALC: 41.6 % — SIGNIFICANT CHANGE UP (ref 39–50)
HGB BLD-MCNC: 13.7 G/DL — SIGNIFICANT CHANGE UP (ref 13–17)
LYMPHOCYTES # BLD AUTO: 14.1 % — SIGNIFICANT CHANGE UP (ref 13–44)
LYMPHOCYTES # BLD AUTO: 2 K/UL — SIGNIFICANT CHANGE UP (ref 1–3.3)
MCHC RBC-ENTMCNC: 30.7 PG — SIGNIFICANT CHANGE UP (ref 27–34)
MCHC RBC-ENTMCNC: 33 G/DL — SIGNIFICANT CHANGE UP (ref 32–36)
MCV RBC AUTO: 93.1 FL — SIGNIFICANT CHANGE UP (ref 80–100)
MONOCYTES # BLD AUTO: 1.1 K/UL — HIGH (ref 0–0.9)
MONOCYTES NFR BLD AUTO: 8 % — SIGNIFICANT CHANGE UP (ref 2–14)
NEUTROPHILS # BLD AUTO: 10.5 K/UL — HIGH (ref 1.8–7.4)
NEUTROPHILS NFR BLD AUTO: 75.4 % — SIGNIFICANT CHANGE UP (ref 43–77)
PLATELET # BLD AUTO: 422 K/UL — HIGH (ref 150–400)
RBC # BLD: 4.47 M/UL — SIGNIFICANT CHANGE UP (ref 4.2–5.8)
RBC # FLD: 11.7 % — SIGNIFICANT CHANGE UP (ref 10.3–14.5)
WBC # BLD: 14 K/UL — HIGH (ref 3.8–10.5)
WBC # FLD AUTO: 14 K/UL — HIGH (ref 3.8–10.5)

## 2019-02-26 PROCEDURE — 93010 ELECTROCARDIOGRAM REPORT: CPT

## 2019-02-27 DIAGNOSIS — E86.0 DEHYDRATION: ICD-10-CM

## 2019-02-27 DIAGNOSIS — R11.2 NAUSEA WITH VOMITING, UNSPECIFIED: ICD-10-CM

## 2019-02-27 DIAGNOSIS — Z51.11 ENCOUNTER FOR ANTINEOPLASTIC CHEMOTHERAPY: ICD-10-CM

## 2019-03-04 ENCOUNTER — MESSAGE (OUTPATIENT)
Age: 69
End: 2019-03-04

## 2019-03-12 ENCOUNTER — OUTPATIENT (OUTPATIENT)
Dept: OUTPATIENT SERVICES | Facility: HOSPITAL | Age: 69
LOS: 1 days | Discharge: HOME | End: 2019-03-12

## 2019-03-12 DIAGNOSIS — C34.90 MALIGNANT NEOPLASM OF UNSPECIFIED PART OF UNSPECIFIED BRONCHUS OR LUNG: ICD-10-CM

## 2019-03-12 DIAGNOSIS — Z98.890 OTHER SPECIFIED POSTPROCEDURAL STATES: Chronic | ICD-10-CM

## 2019-03-12 PROBLEM — K40.90 UNILATERAL INGUINAL HERNIA, WITHOUT OBSTRUCTION OR GANGRENE, NOT SPECIFIED AS RECURRENT: Chronic | Status: ACTIVE | Noted: 2019-02-25

## 2019-03-12 PROBLEM — C79.9 SECONDARY MALIGNANT NEOPLASM OF UNSPECIFIED SITE: Chronic | Status: ACTIVE | Noted: 2019-02-25

## 2019-03-12 PROBLEM — R59.1 GENERALIZED ENLARGED LYMPH NODES: Chronic | Status: ACTIVE | Noted: 2019-02-25

## 2019-03-12 PROBLEM — K58.9 IRRITABLE BOWEL SYNDROME WITHOUT DIARRHEA: Chronic | Status: ACTIVE | Noted: 2019-02-25

## 2019-03-19 ENCOUNTER — APPOINTMENT (OUTPATIENT)
Dept: INFUSION THERAPY | Facility: HOSPITAL | Age: 69
End: 2019-03-19

## 2019-03-19 ENCOUNTER — APPOINTMENT (OUTPATIENT)
Dept: HEMATOLOGY ONCOLOGY | Facility: CLINIC | Age: 69
End: 2019-03-19
Payer: MEDICARE

## 2019-03-19 ENCOUNTER — RESULT REVIEW (OUTPATIENT)
Age: 69
End: 2019-03-19

## 2019-03-19 ENCOUNTER — LABORATORY RESULT (OUTPATIENT)
Age: 69
End: 2019-03-19

## 2019-03-19 VITALS
BODY MASS INDEX: 18.9 KG/M2 | WEIGHT: 126.1 LBS | SYSTOLIC BLOOD PRESSURE: 110 MMHG | RESPIRATION RATE: 16 BRPM | HEART RATE: 62 BPM | DIASTOLIC BLOOD PRESSURE: 71 MMHG | TEMPERATURE: 97.3 F | OXYGEN SATURATION: 97 %

## 2019-03-19 LAB
BASOPHILS # BLD AUTO: 0 K/UL — SIGNIFICANT CHANGE UP (ref 0–0.2)
BASOPHILS NFR BLD AUTO: 0.5 % — SIGNIFICANT CHANGE UP (ref 0–2)
EOSINOPHIL # BLD AUTO: 0.2 K/UL — SIGNIFICANT CHANGE UP (ref 0–0.5)
EOSINOPHIL NFR BLD AUTO: 2.4 % — SIGNIFICANT CHANGE UP (ref 0–6)
HCT VFR BLD CALC: 37.1 % — LOW (ref 39–50)
HGB BLD-MCNC: 12.9 G/DL — LOW (ref 13–17)
LYMPHOCYTES # BLD AUTO: 1.6 K/UL — SIGNIFICANT CHANGE UP (ref 1–3.3)
LYMPHOCYTES # BLD AUTO: 20.8 % — SIGNIFICANT CHANGE UP (ref 13–44)
MCHC RBC-ENTMCNC: 32.4 PG — SIGNIFICANT CHANGE UP (ref 27–34)
MCHC RBC-ENTMCNC: 34.8 G/DL — SIGNIFICANT CHANGE UP (ref 32–36)
MCV RBC AUTO: 93.1 FL — SIGNIFICANT CHANGE UP (ref 80–100)
MONOCYTES # BLD AUTO: 1 K/UL — HIGH (ref 0–0.9)
MONOCYTES NFR BLD AUTO: 12.3 % — SIGNIFICANT CHANGE UP (ref 2–14)
NEUTROPHILS # BLD AUTO: 5.1 K/UL — SIGNIFICANT CHANGE UP (ref 1.8–7.4)
NEUTROPHILS NFR BLD AUTO: 64 % — SIGNIFICANT CHANGE UP (ref 43–77)
PLATELET # BLD AUTO: 489 K/UL — HIGH (ref 150–400)
RBC # BLD: 3.98 M/UL — LOW (ref 4.2–5.8)
RBC # FLD: 12.2 % — SIGNIFICANT CHANGE UP (ref 10.3–14.5)
WBC # BLD: 8 K/UL — SIGNIFICANT CHANGE UP (ref 3.8–10.5)
WBC # FLD AUTO: 8 K/UL — SIGNIFICANT CHANGE UP (ref 3.8–10.5)

## 2019-03-19 PROCEDURE — 99215 OFFICE O/P EST HI 40 MIN: CPT

## 2019-03-19 NOTE — ASSESSMENT
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 PDL1 unknown. \par Given excellent PS and unknown PDL1 and absence of  mutations, started carbo/pemetrexed and Keytruda triplet combo as per KEynote 189 study. Pt is s/p cycle 1 and is doing well. \par Proceed with cycle 2\par Scans after this cycle- order today\par Constipation management discussed. \par One skin lesion on rt side of face and actinic keratosis lesions on face- will refer to derm\par We reiterated importance of timely reporting of AEs. \par OV in 3 weeks.  [Palliative] : Goals of care discussed with patient: Palliative

## 2019-03-19 NOTE — PHYSICAL EXAM
[Restricted in physically strenuous activity but ambulatory and able to carry out work of a light or sedentary nature] : Status 1- Restricted in physically strenuous activity but ambulatory and able to carry out work of a light or sedentary nature, e.g., light house work, office work [Thin] : thin [Normal] : affect appropriate [de-identified] : ecchymoses at IV site, hyperpigmented lesion on lower lip.

## 2019-03-19 NOTE — HISTORY OF PRESENT ILLNESS
[Disease: _____________________] : Disease: [unfilled] [T: ___] : T[unfilled] [N: ___] : N[unfilled] [M: ___] : M[unfilled] [AJCC Stage: ____] : AJCC Stage: [unfilled] [de-identified] : Mr. Soto is a pleasant 67 yo m with recently discovered lung mass here for consult visit. \par Pt who is very active, prior athlete, ex-, with hx of anxiety, HTN, ex-smoker (quit 2016), prior marijuana use, some occupational exposure to toxic chemicals, developed dyspnea in 2016, was diagnosed with COPD and is under the care of pulmonologists. Around end of September, developed hoarseness of voice which was thought to be laryngitis- was sent to ENT by domestic partner who is a speech pathologists. He was given medrol pack which did not help and then referred for CT chest which showed left upper lobe mass measuring 6.6 cm as well as hilar/mediastinal adenopathy and b/l nodules consistent with mets. He also had MRI brain without any contrast in November done for headache showed microvascular changes. Headaches have since resolved. Since last visit, h has seen Dr. Hernandez who discussed bronch with him but he declined. Hence, she referred him for CT guided bx of lung mass, which he had done a couple of weeks ago. 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 NGS and PDL1 are pending. He also saw Dr. Washington at [facility] for another opinion. 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 Pt was started on Carbo/pemetrexed and Keytruda. He has completed 1 cycles of this. He says he has been having stomach pains along with constipation. He took Hyoscine as was recommended by his GI which did not help. He then started taking some herbal meds for constipation which helped. His issue was constipation and he never had diarrhea. He feels fine otherwise. Voice is stronger.  [de-identified] : adeno ca [2 - Distress Level] : Distress Level: 2 [Patient given social work contact information and resource sheet] : Patient was given social work contact information and resource sheet

## 2019-03-19 NOTE — REVIEW OF SYSTEMS
[Fatigue] : fatigue [Recent Change In Weight] : ~T recent weight change [Nosebleeds] : no nosebleeds [Lower Ext Edema] : lower extremity edema [Shortness Of Breath] : no shortness of breath [Cough] : no cough [Negative] : Allergic/Immunologic

## 2019-03-20 DIAGNOSIS — Z51.11 ENCOUNTER FOR ANTINEOPLASTIC CHEMOTHERAPY: ICD-10-CM

## 2019-03-20 DIAGNOSIS — E86.0 DEHYDRATION: ICD-10-CM

## 2019-03-20 DIAGNOSIS — R11.2 NAUSEA WITH VOMITING, UNSPECIFIED: ICD-10-CM

## 2019-03-21 ENCOUNTER — TRANSCRIPTION ENCOUNTER (OUTPATIENT)
Age: 69
End: 2019-03-21

## 2019-03-31 ENCOUNTER — FORM ENCOUNTER (OUTPATIENT)
Age: 69
End: 2019-03-31

## 2019-04-01 ENCOUNTER — APPOINTMENT (OUTPATIENT)
Dept: CT IMAGING | Facility: IMAGING CENTER | Age: 69
End: 2019-04-01
Payer: MEDICARE

## 2019-04-01 ENCOUNTER — OUTPATIENT (OUTPATIENT)
Dept: OUTPATIENT SERVICES | Facility: HOSPITAL | Age: 69
LOS: 1 days | End: 2019-04-01
Payer: MEDICARE

## 2019-04-01 DIAGNOSIS — C34.90 MALIGNANT NEOPLASM OF UNSPECIFIED PART OF UNSPECIFIED BRONCHUS OR LUNG: ICD-10-CM

## 2019-04-01 DIAGNOSIS — Z98.890 OTHER SPECIFIED POSTPROCEDURAL STATES: Chronic | ICD-10-CM

## 2019-04-01 PROCEDURE — 71260 CT THORAX DX C+: CPT

## 2019-04-01 PROCEDURE — 71260 CT THORAX DX C+: CPT | Mod: 26

## 2019-04-01 PROCEDURE — 74160 CT ABDOMEN W/CONTRAST: CPT

## 2019-04-01 PROCEDURE — 74160 CT ABDOMEN W/CONTRAST: CPT | Mod: 26

## 2019-04-02 ENCOUNTER — APPOINTMENT (OUTPATIENT)
Dept: DERMATOLOGY | Facility: CLINIC | Age: 69
End: 2019-04-02
Payer: MEDICARE

## 2019-04-02 VITALS
WEIGHT: 130 LBS | HEIGHT: 66 IN | SYSTOLIC BLOOD PRESSURE: 116 MMHG | BODY MASS INDEX: 20.89 KG/M2 | DIASTOLIC BLOOD PRESSURE: 74 MMHG

## 2019-04-02 DIAGNOSIS — L82.1 OTHER SEBORRHEIC KERATOSIS: ICD-10-CM

## 2019-04-02 PROCEDURE — 99203 OFFICE O/P NEW LOW 30 MIN: CPT

## 2019-04-02 NOTE — HISTORY OF PRESENT ILLNESS
[FreeTextEntry1] : Growth on the R cheek; Rash on Arms [de-identified] : 68M with Stage IV Lung CA s/p 2 cycles of Carboplatin/Pemetrexed/Pembro here for above.\par \par 1) Notes a growth on the R cheek that he feels appeared after he started chemotherapy. Asymptomatic. No bleeding, pain or itch. No treatments tried.\par 2) Also notes an asymptomatic rash on the forearms that also started after chemotherapy. No treatments tried. No prior hx of psoriasis. \par \par Otherwise, no new, evolving, or symptomatic skin lesions.

## 2019-04-02 NOTE — CONSULT LETTER
[Dear  ___] : Dear  [unfilled], [Consult Letter:] : I had the pleasure of evaluating your patient, [unfilled]. [Please see my note below.] : Please see my note below. [Consult Closing:] : Thank you very much for allowing me to participate in the care of this patient.  If you have any questions, please do not hesitate to contact me. [Sincerely,] : Sincerely, [FreeTextEntry3] : Osmani Sanches MD\par [facility]

## 2019-04-02 NOTE — PHYSICAL EXAM
[Alert] : alert [Oriented x 3] : ~L oriented x 3 [Well Nourished] : well nourished [Conjunctiva Non-injected] : conjunctiva non-injected [No Visual Lymphadenopathy] : no visual  lymphadenopathy [No Clubbing] : no clubbing [No Edema] : no edema [No Bromhidrosis] : no bromhidrosis [No Chromhidrosis] : no chromhidrosis [FreeTextEntry3] : R cheek with stuck-on brown papule\par Forearm with well-demarcated erythematous scaly plaques

## 2019-04-09 ENCOUNTER — RESULT REVIEW (OUTPATIENT)
Age: 69
End: 2019-04-09

## 2019-04-09 ENCOUNTER — LABORATORY RESULT (OUTPATIENT)
Age: 69
End: 2019-04-09

## 2019-04-09 ENCOUNTER — APPOINTMENT (OUTPATIENT)
Dept: INFUSION THERAPY | Facility: HOSPITAL | Age: 69
End: 2019-04-09

## 2019-04-09 LAB
BASOPHILS # BLD AUTO: 0.1 K/UL — SIGNIFICANT CHANGE UP (ref 0–0.2)
BASOPHILS NFR BLD AUTO: 0.9 % — SIGNIFICANT CHANGE UP (ref 0–2)
EOSINOPHIL # BLD AUTO: 0 K/UL — SIGNIFICANT CHANGE UP (ref 0–0.5)
EOSINOPHIL NFR BLD AUTO: 0 % — SIGNIFICANT CHANGE UP (ref 0–6)
HCT VFR BLD CALC: 37.8 % — LOW (ref 39–50)
HGB BLD-MCNC: 13 G/DL — SIGNIFICANT CHANGE UP (ref 13–17)
LYMPHOCYTES # BLD AUTO: 1.7 K/UL — SIGNIFICANT CHANGE UP (ref 1–3.3)
LYMPHOCYTES # BLD AUTO: 25 % — SIGNIFICANT CHANGE UP (ref 13–44)
MCHC RBC-ENTMCNC: 32.1 PG — SIGNIFICANT CHANGE UP (ref 27–34)
MCHC RBC-ENTMCNC: 34.5 G/DL — SIGNIFICANT CHANGE UP (ref 32–36)
MCV RBC AUTO: 92.9 FL — SIGNIFICANT CHANGE UP (ref 80–100)
MONOCYTES # BLD AUTO: 0.9 K/UL — SIGNIFICANT CHANGE UP (ref 0–0.9)
MONOCYTES NFR BLD AUTO: 12.6 % — SIGNIFICANT CHANGE UP (ref 2–14)
NEUTROPHILS # BLD AUTO: 4.3 K/UL — SIGNIFICANT CHANGE UP (ref 1.8–7.4)
NEUTROPHILS NFR BLD AUTO: 61.5 % — SIGNIFICANT CHANGE UP (ref 43–77)
PLATELET # BLD AUTO: 464 K/UL — HIGH (ref 150–400)
RBC # BLD: 4.07 M/UL — LOW (ref 4.2–5.8)
RBC # FLD: 13.1 % — SIGNIFICANT CHANGE UP (ref 10.3–14.5)
WBC # BLD: 7 K/UL — SIGNIFICANT CHANGE UP (ref 3.8–10.5)
WBC # FLD AUTO: 7 K/UL — SIGNIFICANT CHANGE UP (ref 3.8–10.5)

## 2019-04-10 ENCOUNTER — APPOINTMENT (OUTPATIENT)
Dept: HEMATOLOGY ONCOLOGY | Facility: CLINIC | Age: 69
End: 2019-04-10
Payer: MEDICARE

## 2019-04-10 VITALS
OXYGEN SATURATION: 93 % | TEMPERATURE: 97.5 F | DIASTOLIC BLOOD PRESSURE: 73 MMHG | HEART RATE: 79 BPM | BODY MASS INDEX: 20.82 KG/M2 | RESPIRATION RATE: 16 BRPM | WEIGHT: 128.97 LBS | SYSTOLIC BLOOD PRESSURE: 118 MMHG

## 2019-04-10 PROCEDURE — 99214 OFFICE O/P EST MOD 30 MIN: CPT

## 2019-04-10 NOTE — ASSESSMENT
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 PDL1 unknown. \par Given excellent PS and unknown PDL1 and absence of  mutations, started carbo/pemetrexed and Keytruda triplet combo as per KEynote 189 study. Pt is s/p cycle 1 and is doing well. \par Cycle 4 due on 4/30/19. Will drop Carbo after 4.\par Scans reviewed. Some response. Continue treatment. \par Constipation management discussed. \par OV 6 weeks [Palliative] : Goals of care discussed with patient: Palliative

## 2019-04-10 NOTE — HISTORY OF PRESENT ILLNESS
[T: ___] : T[unfilled] [Disease: _____________________] : Disease: [unfilled] [AJCC Stage: ____] : AJCC Stage: [unfilled] [M: ___] : M[unfilled] [N: ___] : N[unfilled] [de-identified] : Mr. Soto is a pleasant 67 yo m with recently discovered lung mass here for consult visit. \par Pt who is very active, prior athlete, ex-, with hx of anxiety, HTN, ex-smoker (quit 2016), prior marijuana use, some occupational exposure to toxic chemicals, developed dyspnea in 2016, was diagnosed with COPD and is under the care of pulmonologists. Around end of September, developed hoarseness of voice which was thought to be laryngitis- was sent to ENT by domestic partner who is a speech pathologists. He was given medrol pack which did not help and then referred for CT chest which showed left upper lobe mass measuring 6.6 cm as well as hilar/mediastinal adenopathy and b/l nodules consistent with mets. He also had MRI brain without any contrast in November done for headache showed microvascular changes. Headaches have since resolved. Since last visit,  has seen Dr. Hernandez who discussed bronch with him but he declined. Hence, she referred him for CT guided bx of lung mass, which he had done a couple of weeks ago. 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 NGS and PDL1 are pending. He also saw Dr. Washington at [facility] for another opinion. 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 Pt was started on Carbo/pemetrexed and Keytruda. He has completed 3 cycles of this. He says he has been having stomach pains along with constipation. He took Hyoscine as was recommended by his GI which did not help. He then started taking some herbal meds for constipation which helped. Wife has been increasing fiber which is causing increased gas. [facility] has had recent scans. Presents for results. [facility] saw dermatologist and all findings benign  [de-identified] : adeno ca [2 - Distress Level] : Distress Level: 2 [Patient given social work contact information and resource sheet] : Patient was given social work contact information and resource sheet

## 2019-04-10 NOTE — REVIEW OF SYSTEMS
[Fatigue] : no fatigue [Recent Change In Weight] : ~T recent weight change [Nosebleeds] : no nosebleeds [Lower Ext Edema] : lower extremity edema [Shortness Of Breath] : no shortness of breath [Cough] : no cough [Negative] : Allergic/Immunologic

## 2019-04-24 ENCOUNTER — OUTPATIENT (OUTPATIENT)
Dept: OUTPATIENT SERVICES | Facility: HOSPITAL | Age: 69
LOS: 1 days | Discharge: HOME | End: 2019-04-24

## 2019-04-24 DIAGNOSIS — Z98.890 OTHER SPECIFIED POSTPROCEDURAL STATES: Chronic | ICD-10-CM

## 2019-04-24 DIAGNOSIS — C34.90 MALIGNANT NEOPLASM OF UNSPECIFIED PART OF UNSPECIFIED BRONCHUS OR LUNG: ICD-10-CM

## 2019-04-30 ENCOUNTER — RESULT REVIEW (OUTPATIENT)
Age: 69
End: 2019-04-30

## 2019-04-30 ENCOUNTER — LABORATORY RESULT (OUTPATIENT)
Age: 69
End: 2019-04-30

## 2019-04-30 ENCOUNTER — APPOINTMENT (OUTPATIENT)
Dept: INFUSION THERAPY | Facility: HOSPITAL | Age: 69
End: 2019-04-30

## 2019-04-30 LAB
BASOPHILS # BLD AUTO: 0 K/UL — SIGNIFICANT CHANGE UP (ref 0–0.2)
BASOPHILS NFR BLD AUTO: 0.7 % — SIGNIFICANT CHANGE UP (ref 0–2)
EOSINOPHIL # BLD AUTO: 0 K/UL — SIGNIFICANT CHANGE UP (ref 0–0.5)
EOSINOPHIL NFR BLD AUTO: 0 % — SIGNIFICANT CHANGE UP (ref 0–6)
HCT VFR BLD CALC: 36.9 % — LOW (ref 39–50)
HGB BLD-MCNC: 13 G/DL — SIGNIFICANT CHANGE UP (ref 13–17)
LYMPHOCYTES # BLD AUTO: 2 K/UL — SIGNIFICANT CHANGE UP (ref 1–3.3)
LYMPHOCYTES # BLD AUTO: 29.3 % — SIGNIFICANT CHANGE UP (ref 13–44)
MCHC RBC-ENTMCNC: 33 PG — SIGNIFICANT CHANGE UP (ref 27–34)
MCHC RBC-ENTMCNC: 35.3 G/DL — SIGNIFICANT CHANGE UP (ref 32–36)
MCV RBC AUTO: 93.4 FL — SIGNIFICANT CHANGE UP (ref 80–100)
MONOCYTES # BLD AUTO: 0.9 K/UL — SIGNIFICANT CHANGE UP (ref 0–0.9)
MONOCYTES NFR BLD AUTO: 13.3 % — SIGNIFICANT CHANGE UP (ref 2–14)
NEUTROPHILS # BLD AUTO: 3.8 K/UL — SIGNIFICANT CHANGE UP (ref 1.8–7.4)
NEUTROPHILS NFR BLD AUTO: 56.7 % — SIGNIFICANT CHANGE UP (ref 43–77)
PLATELET # BLD AUTO: 402 K/UL — HIGH (ref 150–400)
RBC # BLD: 3.95 M/UL — LOW (ref 4.2–5.8)
RBC # FLD: 14.2 % — SIGNIFICANT CHANGE UP (ref 10.3–14.5)
WBC # BLD: 6.8 K/UL — SIGNIFICANT CHANGE UP (ref 3.8–10.5)
WBC # FLD AUTO: 6.8 K/UL — SIGNIFICANT CHANGE UP (ref 3.8–10.5)

## 2019-05-01 DIAGNOSIS — Z51.11 ENCOUNTER FOR ANTINEOPLASTIC CHEMOTHERAPY: ICD-10-CM

## 2019-05-01 DIAGNOSIS — R11.2 NAUSEA WITH VOMITING, UNSPECIFIED: ICD-10-CM

## 2019-05-01 DIAGNOSIS — E86.0 DEHYDRATION: ICD-10-CM

## 2019-05-08 ENCOUNTER — APPOINTMENT (OUTPATIENT)
Dept: HEMATOLOGY ONCOLOGY | Facility: CLINIC | Age: 69
End: 2019-05-08
Payer: MEDICARE

## 2019-05-08 VITALS
SYSTOLIC BLOOD PRESSURE: 146 MMHG | HEART RATE: 63 BPM | WEIGHT: 128.31 LBS | DIASTOLIC BLOOD PRESSURE: 88 MMHG | RESPIRATION RATE: 16 BRPM | OXYGEN SATURATION: 94 % | TEMPERATURE: 97.5 F | BODY MASS INDEX: 20.71 KG/M2

## 2019-05-08 PROCEDURE — 99215 OFFICE O/P EST HI 40 MIN: CPT

## 2019-05-08 RX ORDER — CHLORHEXIDINE GLUCONATE 4 %
5 LIQUID (ML) TOPICAL AT BEDTIME
Refills: 0 | Status: ACTIVE | COMMUNITY

## 2019-05-08 RX ORDER — AMLODIPINE BESYLATE AND BENAZEPRIL HYDROCHLORIDE 10; 40 MG/1; MG/1
10-40 CAPSULE ORAL
Refills: 0 | Status: DISCONTINUED | COMMUNITY
End: 2019-05-08

## 2019-05-08 RX ORDER — ALBUTEROL SULFATE 90 UG/1
108 (90 BASE) AEROSOL, METERED RESPIRATORY (INHALATION)
Refills: 0 | Status: ACTIVE | COMMUNITY

## 2019-05-08 RX ORDER — PROCHLORPERAZINE MALEATE 10 MG/1
10 TABLET ORAL
Qty: 90 | Refills: 3 | Status: DISCONTINUED | COMMUNITY
Start: 2019-02-21 | End: 2019-05-08

## 2019-05-08 NOTE — REVIEW OF SYSTEMS
[Fatigue] : no fatigue [Recent Change In Weight] : ~T recent weight change [Nosebleeds] : no nosebleeds [Chest Pain] : chest pain [Cough] : no cough [Shortness Of Breath] : no shortness of breath [Negative] : Allergic/Immunologic

## 2019-05-08 NOTE — PHYSICAL EXAM
[Restricted in physically strenuous activity but ambulatory and able to carry out work of a light or sedentary nature] : Status 1- Restricted in physically strenuous activity but ambulatory and able to carry out work of a light or sedentary nature, e.g., light house work, office work [Thin] : thin [de-identified] : rt forearm abrasion sec to fall [Normal] : affect appropriate

## 2019-05-08 NOTE — HISTORY OF PRESENT ILLNESS
[Disease: _____________________] : Disease: [unfilled] [T: ___] : T[unfilled] [N: ___] : N[unfilled] [M: ___] : M[unfilled] [AJCC Stage: ____] : AJCC Stage: [unfilled] [de-identified] : Mr. Soto is a pleasant 67 yo m with recently discovered lung mass here for consult visit. \par Pt who is very active, prior athlete, ex-, with hx of anxiety, HTN, ex-smoker (quit 2016), prior marijuana use, some occupational exposure to toxic chemicals, developed dyspnea in 2016, was diagnosed with COPD and is under the care of pulmonologists. Around end of September, developed hoarseness of voice which was thought to be laryngitis- was sent to ENT by domestic partner who is a speech pathologists. He was given medrol pack which did not help and then referred for CT chest which showed left upper lobe mass measuring 6.6 cm as well as hilar/mediastinal adenopathy and b/l nodules consistent with mets. He also had MRI brain without any contrast in November done for headache showed microvascular changes. Headaches have since resolved. Since last visit,  has seen Dr. Hernandez who discussed bronch with him but he declined. Hence, she referred him for CT guided bx of lung mass, which he had done a couple of weeks ago. 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 NGS and PDL1 are pending. He also saw Dr. Washington at [facility] for another opinion. 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 Pt was started on Carbo/pemetrexed and Keytruda. He has completed 3 cycles of this. He says he has been having stomach pains along with constipation. He took Hyoscine as was recommended by his GI which did not help. He then started taking some herbal meds for constipation which helped. Wife has been increasing fiber which is causing increased gas. \[facility]\"" has had recent scans. Presents for results. \[facility]\"" saw dermatologist and all findings benign \par \par 5/8/19: Pt here for follow up. He sustained a fall while trying to climb a ladder a few days ago, hurt his right ribs. He went to urgent care and had rib series, which showed no fx but a possible opacity related to PNA or atelectasis or mass. The pain has resolved and he has no new complaints.  [2 - Distress Level] : Distress Level: 2 [de-identified] : adeno ca [Patient given social work contact information and resource sheet] : Patient was given social work contact information and resource sheet

## 2019-05-08 NOTE — ASSESSMENT
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 PDL1 unknown. \par Given excellent PS and unknown PDL1 and absence of  mutations, started carbo/pemetrexed and Keytruda triplet combo as per KEynote 189 study. Pt is s/p cycle 4 and is doing well. \par Scans after cycle 3 showed [place]\par Cycle 4 was on 4/30/19. \par Recent fall and bruise to ribs and forearm\par Discussed at depth- CXR findings and possibly that the finding mentioned represents known partial collapse of RML. \par OV 6 weeks

## 2019-05-21 ENCOUNTER — RESULT REVIEW (OUTPATIENT)
Age: 69
End: 2019-05-21

## 2019-05-21 ENCOUNTER — APPOINTMENT (OUTPATIENT)
Dept: HEMATOLOGY ONCOLOGY | Facility: CLINIC | Age: 69
End: 2019-05-21
Payer: MEDICARE

## 2019-05-21 ENCOUNTER — LABORATORY RESULT (OUTPATIENT)
Age: 69
End: 2019-05-21

## 2019-05-21 ENCOUNTER — APPOINTMENT (OUTPATIENT)
Dept: INFUSION THERAPY | Facility: HOSPITAL | Age: 69
End: 2019-05-21

## 2019-05-21 VITALS
RESPIRATION RATE: 15 BRPM | DIASTOLIC BLOOD PRESSURE: 83 MMHG | TEMPERATURE: 97.8 F | OXYGEN SATURATION: 94 % | SYSTOLIC BLOOD PRESSURE: 132 MMHG | WEIGHT: 129.41 LBS | HEART RATE: 67 BPM | BODY MASS INDEX: 20.89 KG/M2

## 2019-05-21 LAB
BASOPHILS # BLD AUTO: 0 K/UL — SIGNIFICANT CHANGE UP (ref 0–0.2)
BASOPHILS NFR BLD AUTO: 0.7 % — SIGNIFICANT CHANGE UP (ref 0–2)
EOSINOPHIL # BLD AUTO: 0 K/UL — SIGNIFICANT CHANGE UP (ref 0–0.5)
EOSINOPHIL NFR BLD AUTO: 0 % — SIGNIFICANT CHANGE UP (ref 0–6)
HCT VFR BLD CALC: 37.8 % — LOW (ref 39–50)
HGB BLD-MCNC: 12.9 G/DL — LOW (ref 13–17)
LYMPHOCYTES # BLD AUTO: 1.9 K/UL — SIGNIFICANT CHANGE UP (ref 1–3.3)
LYMPHOCYTES # BLD AUTO: 28.3 % — SIGNIFICANT CHANGE UP (ref 13–44)
MCHC RBC-ENTMCNC: 32 PG — SIGNIFICANT CHANGE UP (ref 27–34)
MCHC RBC-ENTMCNC: 34.2 G/DL — SIGNIFICANT CHANGE UP (ref 32–36)
MCV RBC AUTO: 93.5 FL — SIGNIFICANT CHANGE UP (ref 80–100)
MONOCYTES # BLD AUTO: 1 K/UL — HIGH (ref 0–0.9)
MONOCYTES NFR BLD AUTO: 15 % — HIGH (ref 2–14)
NEUTROPHILS # BLD AUTO: 3.7 K/UL — SIGNIFICANT CHANGE UP (ref 1.8–7.4)
NEUTROPHILS NFR BLD AUTO: 56 % — SIGNIFICANT CHANGE UP (ref 43–77)
PLATELET # BLD AUTO: 319 K/UL — SIGNIFICANT CHANGE UP (ref 150–400)
RBC # BLD: 4.05 M/UL — LOW (ref 4.2–5.8)
RBC # FLD: 15 % — HIGH (ref 10.3–14.5)
WBC # BLD: 6.7 K/UL — SIGNIFICANT CHANGE UP (ref 3.8–10.5)
WBC # FLD AUTO: 6.7 K/UL — SIGNIFICANT CHANGE UP (ref 3.8–10.5)

## 2019-05-21 PROCEDURE — 99215 OFFICE O/P EST HI 40 MIN: CPT

## 2019-05-22 ENCOUNTER — APPOINTMENT (OUTPATIENT)
Dept: DERMATOLOGY | Facility: CLINIC | Age: 69
End: 2019-05-22
Payer: MEDICARE

## 2019-05-22 DIAGNOSIS — L30.9 DERMATITIS, UNSPECIFIED: ICD-10-CM

## 2019-05-22 PROCEDURE — 99214 OFFICE O/P EST MOD 30 MIN: CPT

## 2019-05-22 NOTE — PHYSICAL EXAM
[Alert] : alert [Oriented x 3] : ~L oriented x 3 [Well Nourished] : well nourished [Conjunctiva Non-injected] : conjunctiva non-injected [No Visual Lymphadenopathy] : no visual  lymphadenopathy [No Clubbing] : no clubbing [No Edema] : no edema [No Bromhidrosis] : no bromhidrosis [No Chromhidrosis] : no chromhidrosis [FreeTextEntry3] : Forearms clear\par Chest and back with tan scaly adherent patches

## 2019-05-22 NOTE — HISTORY OF PRESENT ILLNESS
[FreeTextEntry1] : f/u pembro induced psoriasis [de-identified] : 68M with Stage IV Lung CA s/p 4 cycles of Carboplatin/Pemetrexed/Pembro here for above.\par \par 1) Notes psoriasis has improved with betamethasone use. Using PRN. \par 2) Also notes a new rash on the chest x weeks. Comes and goes. Has had a similar rash in the past. Asymptomatic. No treatments tried.

## 2019-05-22 NOTE — REVIEW OF SYSTEMS
[Chest Pain] : chest pain [Fatigue] : no fatigue [Recent Change In Weight] : ~T no recent weight change [Nosebleeds] : no nosebleeds [Shortness Of Breath] : no shortness of breath [Cough] : no cough [Negative] : Constitutional [FreeTextEntry5] : as above

## 2019-05-22 NOTE — PHYSICAL EXAM
[Thin] : thin [Normal] : affect appropriate [Fully active, able to carry on all pre-disease performance without restriction] : Status 0 - Fully active, able to carry on all pre-disease performance without restriction [de-identified] : rt forearm abrasion sec to fall

## 2019-05-22 NOTE — HISTORY OF PRESENT ILLNESS
[Disease: _____________________] : Disease: [unfilled] [T: ___] : T[unfilled] [N: ___] : N[unfilled] [M: ___] : M[unfilled] [AJCC Stage: ____] : AJCC Stage: [unfilled] [2 - Distress Level] : Distress Level: 2 [Patient given social work contact information and resource sheet] : Patient was given social work contact information and resource sheet [de-identified] : Mr. Soto is a pleasant 67 yo m with recently discovered lung mass here for consult visit. \par Pt who is very active, prior athlete, ex-, with hx of anxiety, HTN, ex-smoker (quit 2016), prior marijuana use, some occupational exposure to toxic chemicals, developed dyspnea in 2016, was diagnosed with COPD and is under the care of pulmonologists. Around end of September, developed hoarseness of voice which was thought to be laryngitis- was sent to ENT by domestic partner who is a speech pathologists. He was given medrol pack which did not help and then referred for CT chest which showed left upper lobe mass measuring 6.6 cm as well as hilar/mediastinal adenopathy and b/l nodules consistent with mets. He also had MRI brain without any contrast in November done for headache showed microvascular changes. Headaches have since resolved. Since last visit,  has seen Dr. Hernandez who discussed bronch with him but he declined. Hence, she referred him for CT guided bx of lung mass, which he had done a couple of weeks ago. 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 NGS and PDL1 are pending. He also saw Dr. Washington at [facility] for another opinion. 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 Pt was started on Carbo/pemetrexed and Keytruda. He has completed 3 cycles of this. He says he has been having stomach pains along with constipation. He took Hyoscine as was recommended by his GI which did not help. He then started taking some herbal meds for constipation which helped. Wife has been increasing fiber which is causing increased gas. [facility] has had recent scans. Presents for results. [facility] saw dermatologist and all findings benign \par \par 5/8/19: Pt here for follow up. He sustained a fall while trying to climb a ladder a few days ago, hurt his right ribs. He went to urgent care and had rib series, which showed no fx but a possible opacity related to PNA or atelectasis or mass. The pain has resolved and he has no new complaints. \par \par 5/22/19: doing well with no irAEs. Rib pain improved but still present with certain postures. Denies any other symptoms.  [de-identified] : adeno ca

## 2019-05-22 NOTE — ASSESSMENT
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 PDL1 unknown. \par Given excellent PS and unknown PDL1 and absence of  mutations, started carbo/pemetrexed and Keytruda triplet combo as per KEynote 189 study. Pt is s/p cycle 4 and is doing well. \par Scans after cycle 3 showed [place]\par Starting maintenace today \par Recent fall and bruise to ribs and forearm\par Will order CT scan on chest and abdomen as well as bone scan. \par OV after scans.

## 2019-06-04 ENCOUNTER — FORM ENCOUNTER (OUTPATIENT)
Age: 69
End: 2019-06-04

## 2019-06-05 ENCOUNTER — APPOINTMENT (OUTPATIENT)
Dept: NUCLEAR MEDICINE | Facility: IMAGING CENTER | Age: 69
End: 2019-06-05
Payer: MEDICARE

## 2019-06-05 ENCOUNTER — APPOINTMENT (OUTPATIENT)
Dept: CT IMAGING | Facility: IMAGING CENTER | Age: 69
End: 2019-06-05
Payer: MEDICARE

## 2019-06-05 ENCOUNTER — OUTPATIENT (OUTPATIENT)
Dept: OUTPATIENT SERVICES | Facility: HOSPITAL | Age: 69
LOS: 1 days | End: 2019-06-05
Payer: MEDICARE

## 2019-06-05 DIAGNOSIS — Z98.890 OTHER SPECIFIED POSTPROCEDURAL STATES: Chronic | ICD-10-CM

## 2019-06-05 DIAGNOSIS — C34.90 MALIGNANT NEOPLASM OF UNSPECIFIED PART OF UNSPECIFIED BRONCHUS OR LUNG: ICD-10-CM

## 2019-06-05 PROCEDURE — 74160 CT ABDOMEN W/CONTRAST: CPT | Mod: 26

## 2019-06-05 PROCEDURE — 74160 CT ABDOMEN W/CONTRAST: CPT

## 2019-06-05 PROCEDURE — 71260 CT THORAX DX C+: CPT | Mod: 26

## 2019-06-05 PROCEDURE — 78315 BONE IMAGING 3 PHASE: CPT | Mod: 26

## 2019-06-05 PROCEDURE — 78315 BONE IMAGING 3 PHASE: CPT

## 2019-06-05 PROCEDURE — A9561: CPT

## 2019-06-05 PROCEDURE — 71260 CT THORAX DX C+: CPT

## 2019-06-06 ENCOUNTER — OUTPATIENT (OUTPATIENT)
Dept: OUTPATIENT SERVICES | Facility: HOSPITAL | Age: 69
LOS: 1 days | Discharge: HOME | End: 2019-06-06

## 2019-06-06 DIAGNOSIS — Z98.890 OTHER SPECIFIED POSTPROCEDURAL STATES: Chronic | ICD-10-CM

## 2019-06-06 DIAGNOSIS — C34.90 MALIGNANT NEOPLASM OF UNSPECIFIED PART OF UNSPECIFIED BRONCHUS OR LUNG: ICD-10-CM

## 2019-06-11 ENCOUNTER — LABORATORY RESULT (OUTPATIENT)
Age: 69
End: 2019-06-11

## 2019-06-11 ENCOUNTER — RESULT REVIEW (OUTPATIENT)
Age: 69
End: 2019-06-11

## 2019-06-11 ENCOUNTER — APPOINTMENT (OUTPATIENT)
Dept: INFUSION THERAPY | Facility: HOSPITAL | Age: 69
End: 2019-06-11

## 2019-06-11 ENCOUNTER — APPOINTMENT (OUTPATIENT)
Dept: HEMATOLOGY ONCOLOGY | Facility: CLINIC | Age: 69
End: 2019-06-11
Payer: MEDICARE

## 2019-06-11 VITALS
BODY MASS INDEX: 20.75 KG/M2 | SYSTOLIC BLOOD PRESSURE: 136 MMHG | TEMPERATURE: 98.1 F | RESPIRATION RATE: 14 BRPM | DIASTOLIC BLOOD PRESSURE: 85 MMHG | OXYGEN SATURATION: 94 % | HEART RATE: 65 BPM | WEIGHT: 128.53 LBS

## 2019-06-11 LAB
BASOPHILS # BLD AUTO: 0.1 K/UL — SIGNIFICANT CHANGE UP (ref 0–0.2)
BASOPHILS NFR BLD AUTO: 1.2 % — SIGNIFICANT CHANGE UP (ref 0–2)
EOSINOPHIL # BLD AUTO: 0.3 K/UL — SIGNIFICANT CHANGE UP (ref 0–0.5)
EOSINOPHIL NFR BLD AUTO: 3.9 % — SIGNIFICANT CHANGE UP (ref 0–6)
HCT VFR BLD CALC: 38.1 % — LOW (ref 39–50)
HGB BLD-MCNC: 13.1 G/DL — SIGNIFICANT CHANGE UP (ref 13–17)
LYMPHOCYTES # BLD AUTO: 1.9 K/UL — SIGNIFICANT CHANGE UP (ref 1–3.3)
LYMPHOCYTES # BLD AUTO: 27.1 % — SIGNIFICANT CHANGE UP (ref 13–44)
MCHC RBC-ENTMCNC: 32.8 PG — SIGNIFICANT CHANGE UP (ref 27–34)
MCHC RBC-ENTMCNC: 34.4 G/DL — SIGNIFICANT CHANGE UP (ref 32–36)
MCV RBC AUTO: 95.5 FL — SIGNIFICANT CHANGE UP (ref 80–100)
MONOCYTES # BLD AUTO: 0.9 K/UL — SIGNIFICANT CHANGE UP (ref 0–0.9)
MONOCYTES NFR BLD AUTO: 12.7 % — SIGNIFICANT CHANGE UP (ref 2–14)
NEUTROPHILS # BLD AUTO: 3.8 K/UL — SIGNIFICANT CHANGE UP (ref 1.8–7.4)
NEUTROPHILS NFR BLD AUTO: 55.1 % — SIGNIFICANT CHANGE UP (ref 43–77)
PLATELET # BLD AUTO: 365 K/UL — SIGNIFICANT CHANGE UP (ref 150–400)
RBC # BLD: 3.99 M/UL — LOW (ref 4.2–5.8)
RBC # FLD: 14.8 % — HIGH (ref 10.3–14.5)
WBC # BLD: 6.9 K/UL — SIGNIFICANT CHANGE UP (ref 3.8–10.5)
WBC # FLD AUTO: 6.9 K/UL — SIGNIFICANT CHANGE UP (ref 3.8–10.5)

## 2019-06-11 PROCEDURE — 99215 OFFICE O/P EST HI 40 MIN: CPT

## 2019-06-11 NOTE — HISTORY OF PRESENT ILLNESS
[Disease: _____________________] : Disease: [unfilled] [T: ___] : T[unfilled] [M: ___] : M[unfilled] [N: ___] : N[unfilled] [AJCC Stage: ____] : AJCC Stage: [unfilled] [2 - Distress Level] : Distress Level: 2 [Patient given social work contact information and resource sheet] : Patient was given social work contact information and resource sheet [de-identified] : adeno ca [de-identified] : Mr. Soto is a pleasant 67 yo m with recently discovered lung mass here for consult visit. \par Pt who is very active, prior athlete, ex-, with hx of anxiety, HTN, ex-smoker (quit 2016), prior marijuana use, some occupational exposure to toxic chemicals, developed dyspnea in 2016, was diagnosed with COPD and is under the care of pulmonologists. Around end of September, developed hoarseness of voice which was thought to be laryngitis- was sent to ENT by domestic partner who is a speech pathologists. He was given medrol pack which did not help and then referred for CT chest which showed left upper lobe mass measuring 6.6 cm as well as hilar/mediastinal adenopathy and b/l nodules consistent with mets. He also had MRI brain without any contrast in November done for headache showed microvascular changes. Headaches have since resolved. Since last visit,  has seen Dr. Hernandez who discussed bronch with him but he declined. Hence, she referred him for CT guided bx of lung mass, which he had done a couple of weeks ago. 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 NGS and PDL1 are pending. He also saw Dr. Washington at [facility] for another opinion. 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 Pt was started on Carbo/pemetrexed and Keytruda. He has completed 3 cycles of this. He says he has been having stomach pains along with constipation. He took Hyoscine as was recommended by his GI which did not help. He then started taking some herbal meds for constipation which helped. Wife has been increasing fiber which is causing increased gas. [facility] has had recent scans. Presents for results. [facility] saw dermatologist and all findings benign \par \par 5/8/19: Pt here for follow up. He sustained a fall while trying to climb a ladder a few days ago, hurt his right ribs. He went to urgent care and had rib series, which showed no fx but a possible opacity related to PNA or atelectasis or mass. The pain has resolved and he has no new complaints. \par \par 5/22/19: doing well with no irAEs. Rib pain improved but still present with certain postures. Denies any other symptoms. \par \par 6/11/19: NO new complaints. Feeling completely back to normal. Has some abd queasiness for a couple of days after chemo associated with some constipation. Denies any diarrhea. Had CT scans since last visit- excellent CA. Report pasted below\par \par Since 4/1/2019: \par \par Left upper lobe mass has decreased in size, as has left hilar adenopathy. \par \par Multiple bilateral subcentimeter pulmonary nodules are unchanged. \par \par No acute findings. \par

## 2019-06-11 NOTE — ASSESSMENT
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 PDL1 unknown. \par Given excellent PS and unknown PDL1 and absence of  mutations, started carbo/pemetrexed and Keytruda triplet combo as per KEynote 189 study. Pt is s/p cycle 4 and one cycle of maintenance \par Scans after cycle 3 and cycle 1 of maintenance showed excellent [place]\par Continue maintenance \par CEA normalized. \par Cramps- antiemetic related? doesn’t seem like colitis. Will try senna and colace. \par OV in 6 weeks.

## 2019-06-11 NOTE — PHYSICAL EXAM
[Thin] : thin [Fully active, able to carry on all pre-disease performance without restriction] : Status 0 - Fully active, able to carry on all pre-disease performance without restriction [Normal] : affect appropriate [de-identified] : rt forearm abrasion sec to fall

## 2019-06-11 NOTE — REVIEW OF SYSTEMS
[Chest Pain] : chest pain [Negative] : Allergic/Immunologic [Recent Change In Weight] : ~T no recent weight change [Fatigue] : no fatigue [Shortness Of Breath] : no shortness of breath [Nosebleeds] : no nosebleeds [Cough] : no cough [FreeTextEntry5] : as above

## 2019-06-12 DIAGNOSIS — E86.0 DEHYDRATION: ICD-10-CM

## 2019-06-12 DIAGNOSIS — R11.2 NAUSEA WITH VOMITING, UNSPECIFIED: ICD-10-CM

## 2019-06-12 DIAGNOSIS — Z51.11 ENCOUNTER FOR ANTINEOPLASTIC CHEMOTHERAPY: ICD-10-CM

## 2019-06-18 ENCOUNTER — APPOINTMENT (OUTPATIENT)
Dept: HEMATOLOGY ONCOLOGY | Facility: CLINIC | Age: 69
End: 2019-06-18

## 2019-07-02 ENCOUNTER — RESULT REVIEW (OUTPATIENT)
Age: 69
End: 2019-07-02

## 2019-07-02 ENCOUNTER — LABORATORY RESULT (OUTPATIENT)
Age: 69
End: 2019-07-02

## 2019-07-02 ENCOUNTER — APPOINTMENT (OUTPATIENT)
Dept: INFUSION THERAPY | Facility: HOSPITAL | Age: 69
End: 2019-07-02

## 2019-07-02 LAB
BASOPHILS # BLD AUTO: 0.1 K/UL — SIGNIFICANT CHANGE UP (ref 0–0.2)
BASOPHILS NFR BLD AUTO: 0.6 % — SIGNIFICANT CHANGE UP (ref 0–2)
EOSINOPHIL # BLD AUTO: 0.3 K/UL — SIGNIFICANT CHANGE UP (ref 0–0.5)
EOSINOPHIL NFR BLD AUTO: 3.1 % — SIGNIFICANT CHANGE UP (ref 0–6)
HCT VFR BLD CALC: 39.3 % — SIGNIFICANT CHANGE UP (ref 39–50)
HGB BLD-MCNC: 13.2 G/DL — SIGNIFICANT CHANGE UP (ref 13–17)
LYMPHOCYTES # BLD AUTO: 2 K/UL — SIGNIFICANT CHANGE UP (ref 1–3.3)
LYMPHOCYTES # BLD AUTO: 23.6 % — SIGNIFICANT CHANGE UP (ref 13–44)
MCHC RBC-ENTMCNC: 33.1 PG — SIGNIFICANT CHANGE UP (ref 27–34)
MCHC RBC-ENTMCNC: 33.5 G/DL — SIGNIFICANT CHANGE UP (ref 32–36)
MCV RBC AUTO: 99 FL — SIGNIFICANT CHANGE UP (ref 80–100)
MONOCYTES # BLD AUTO: 1.2 K/UL — HIGH (ref 0–0.9)
MONOCYTES NFR BLD AUTO: 13.8 % — SIGNIFICANT CHANGE UP (ref 2–14)
NEUTROPHILS # BLD AUTO: 5.1 K/UL — SIGNIFICANT CHANGE UP (ref 1.8–7.4)
NEUTROPHILS NFR BLD AUTO: 59 % — SIGNIFICANT CHANGE UP (ref 43–77)
PLATELET # BLD AUTO: 326 K/UL — SIGNIFICANT CHANGE UP (ref 150–400)
RBC # BLD: 3.97 M/UL — LOW (ref 4.2–5.8)
RBC # FLD: 14.5 % — SIGNIFICANT CHANGE UP (ref 10.3–14.5)
WBC # BLD: 8.6 K/UL — SIGNIFICANT CHANGE UP (ref 3.8–10.5)
WBC # FLD AUTO: 8.6 K/UL — SIGNIFICANT CHANGE UP (ref 3.8–10.5)

## 2019-07-18 ENCOUNTER — OUTPATIENT (OUTPATIENT)
Dept: OUTPATIENT SERVICES | Facility: HOSPITAL | Age: 69
LOS: 1 days | Discharge: HOME | End: 2019-07-18

## 2019-07-18 DIAGNOSIS — C34.90 MALIGNANT NEOPLASM OF UNSPECIFIED PART OF UNSPECIFIED BRONCHUS OR LUNG: ICD-10-CM

## 2019-07-18 DIAGNOSIS — Z98.890 OTHER SPECIFIED POSTPROCEDURAL STATES: Chronic | ICD-10-CM

## 2019-07-23 ENCOUNTER — LABORATORY RESULT (OUTPATIENT)
Age: 69
End: 2019-07-23

## 2019-07-23 ENCOUNTER — APPOINTMENT (OUTPATIENT)
Dept: INFUSION THERAPY | Facility: HOSPITAL | Age: 69
End: 2019-07-23

## 2019-07-23 ENCOUNTER — APPOINTMENT (OUTPATIENT)
Dept: HEMATOLOGY ONCOLOGY | Facility: CLINIC | Age: 69
End: 2019-07-23
Payer: MEDICARE

## 2019-07-23 ENCOUNTER — RESULT REVIEW (OUTPATIENT)
Age: 69
End: 2019-07-23

## 2019-07-23 VITALS
DIASTOLIC BLOOD PRESSURE: 80 MMHG | SYSTOLIC BLOOD PRESSURE: 140 MMHG | WEIGHT: 132.28 LBS | RESPIRATION RATE: 14 BRPM | HEART RATE: 73 BPM | OXYGEN SATURATION: 96 % | TEMPERATURE: 98.3 F | BODY MASS INDEX: 21.35 KG/M2

## 2019-07-23 LAB
BASOPHILS # BLD AUTO: 0 K/UL — SIGNIFICANT CHANGE UP (ref 0–0.2)
BASOPHILS NFR BLD AUTO: 0.5 % — SIGNIFICANT CHANGE UP (ref 0–2)
EOSINOPHIL # BLD AUTO: 0.2 K/UL — SIGNIFICANT CHANGE UP (ref 0–0.5)
EOSINOPHIL NFR BLD AUTO: 3.2 % — SIGNIFICANT CHANGE UP (ref 0–6)
HCT VFR BLD CALC: 38 % — LOW (ref 39–50)
HGB BLD-MCNC: 12.7 G/DL — LOW (ref 13–17)
LYMPHOCYTES # BLD AUTO: 1.4 K/UL — SIGNIFICANT CHANGE UP (ref 1–3.3)
LYMPHOCYTES # BLD AUTO: 19.8 % — SIGNIFICANT CHANGE UP (ref 13–44)
MCHC RBC-ENTMCNC: 33.3 PG — SIGNIFICANT CHANGE UP (ref 27–34)
MCHC RBC-ENTMCNC: 33.4 G/DL — SIGNIFICANT CHANGE UP (ref 32–36)
MCV RBC AUTO: 99.8 FL — SIGNIFICANT CHANGE UP (ref 80–100)
MONOCYTES # BLD AUTO: 1 K/UL — HIGH (ref 0–0.9)
MONOCYTES NFR BLD AUTO: 15 % — HIGH (ref 2–14)
NEUTROPHILS # BLD AUTO: 4.2 K/UL — SIGNIFICANT CHANGE UP (ref 1.8–7.4)
NEUTROPHILS NFR BLD AUTO: 61.5 % — SIGNIFICANT CHANGE UP (ref 43–77)
PLATELET # BLD AUTO: 313 K/UL — SIGNIFICANT CHANGE UP (ref 150–400)
RBC # BLD: 3.81 M/UL — LOW (ref 4.2–5.8)
RBC # FLD: 12.5 % — SIGNIFICANT CHANGE UP (ref 10.3–14.5)
WBC # BLD: 6.9 K/UL — SIGNIFICANT CHANGE UP (ref 3.8–10.5)
WBC # FLD AUTO: 6.9 K/UL — SIGNIFICANT CHANGE UP (ref 3.8–10.5)

## 2019-07-23 PROCEDURE — 99215 OFFICE O/P EST HI 40 MIN: CPT

## 2019-07-23 NOTE — ASSESSMENT
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 PDL1 unknown. \par Given excellent PS and unknown PDL1 and absence of  mutations, started carbo/pemetrexed and Keytruda triplet combo as per Keynote 189 study. Pt is s/p cycle 4 and is currently on maintenance with Keytruda and Alimta. \par When pt goes on vacation, will hold off on Alimta but continue Keytruda to avoid immunosuppresion. \par CEA stable. \par OV in 6 weeks.  [Palliative] : Goals of care discussed with patient: Palliative

## 2019-07-23 NOTE — PHYSICAL EXAM
[de-identified] : rt forearm abrasion sec to fall [Thin] : thin [Fully active, able to carry on all pre-disease performance without restriction] : Status 0 - Fully active, able to carry on all pre-disease performance without restriction [Normal] : affect appropriate

## 2019-07-23 NOTE — HISTORY OF PRESENT ILLNESS
[Disease: _____________________] : Disease: [unfilled] [T: ___] : T[unfilled] [N: ___] : N[unfilled] [M: ___] : M[unfilled] [AJCC Stage: ____] : AJCC Stage: [unfilled] [de-identified] : Mr. Soto is a pleasant 67 yo m with recently discovered lung mass here for consult visit. \par Pt who is very active, prior athlete, ex-, with hx of anxiety, HTN, ex-smoker (quit 2016), prior marijuana use, some occupational exposure to toxic chemicals, developed dyspnea in 2016, was diagnosed with COPD and is under the care of pulmonologists. Around end of September, developed hoarseness of voice which was thought to be laryngitis- was sent to ENT by domestic partner who is a speech pathologists. He was given medrol pack which did not help and then referred for CT chest which showed left upper lobe mass measuring 6.6 cm as well as hilar/mediastinal adenopathy and b/l nodules consistent with mets. He also had MRI brain without any contrast in November done for headache showed microvascular changes. Headaches have since resolved. Since last visit,  has seen Dr. Hernandez who discussed bronch with him but he declined. Hence, she referred him for CT guided bx of lung mass, which he had done a couple of weeks ago. 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 NGS and PDL1 are pending. He also saw Dr. Washington at [facility] for another opinion. 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 Pt was started on Carbo/pemetrexed and Keytruda. He has completed 3 cycles of this. He says he has been having stomach pains along with constipation. He took Hyoscine as was recommended by his GI which did not help. He then started taking some herbal meds for constipation which helped. Wife has been increasing fiber which is causing increased gas. [facility] has had recent scans. Presents for results. [facility] saw dermatologist and all findings benign \par \par 5/8/19: Pt here for follow up. He sustained a fall while trying to climb a ladder a few days ago, hurt his right ribs. He went to urgent care and had rib series, which showed no fx but a possible opacity related to PNA or atelectasis or mass. The pain has resolved and he has no new complaints. \par \par 5/22/19: doing well with no irAEs. Rib pain improved but still present with certain postures. Denies any other symptoms. \par \par 6/11/19: No new complaints. Feeling completely back to normal. Has some abd queasiness for a couple of days after chemo associated with some constipation. Denies any diarrhea. \par Had CT scans since last visit- excellent [place]. Report pasted below\par Since 4/1/2019: \par Left upper lobe mass has decreased in size, as has left hilar adenopathy. \par Multiple bilateral subcentimeter pulmonary nodules are unchanged. \par No acute findings. \par \par 7/23/19: Pt states he is feeling "great"! Planning to go on a vacation.  [de-identified] : adeno ca [2 - Distress Level] : Distress Level: 2 [Patient given social work contact information and resource sheet] : Patient was given social work contact information and resource sheet

## 2019-07-23 NOTE — REVIEW OF SYSTEMS
[FreeTextEntry5] : as above [Fatigue] : no fatigue [Recent Change In Weight] : ~T no recent weight change [Chest Pain] : no chest pain [Nosebleeds] : no nosebleeds [Shortness Of Breath] : no shortness of breath [Cough] : no cough [Negative] : Allergic/Immunologic

## 2019-07-23 NOTE — REVIEW OF SYSTEMS
[FreeTextEntry5] : as above [Recent Change In Weight] : ~T no recent weight change [Fatigue] : no fatigue [Nosebleeds] : no nosebleeds [Chest Pain] : no chest pain [Shortness Of Breath] : no shortness of breath [Cough] : no cough [Negative] : Allergic/Immunologic

## 2019-07-23 NOTE — HISTORY OF PRESENT ILLNESS
[T: ___] : T[unfilled] [Disease: _____________________] : Disease: [unfilled] [M: ___] : M[unfilled] [N: ___] : N[unfilled] [AJCC Stage: ____] : AJCC Stage: [unfilled] [de-identified] : Mr. Soto is a pleasant 69 yo m with recently discovered lung mass here for consult visit. \par Pt who is very active, prior athlete, ex-, with hx of anxiety, HTN, ex-smoker (quit 2016), prior marijuana use, some occupational exposure to toxic chemicals, developed dyspnea in 2016, was diagnosed with COPD and is under the care of pulmonologists. Around end of September, developed hoarseness of voice which was thought to be laryngitis- was sent to ENT by domestic partner who is a speech pathologists. He was given medrol pack which did not help and then referred for CT chest which showed left upper lobe mass measuring 6.6 cm as well as hilar/mediastinal adenopathy and b/l nodules consistent with mets. He also had MRI brain without any contrast in November done for headache showed microvascular changes. Headaches have since resolved. Since last visit,  has seen Dr. Hernandez who discussed bronch with him but he declined. Hence, she referred him for CT guided bx of lung mass, which he had done a couple of weeks ago. 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 NGS and PDL1 are pending. He also saw Dr. Washington at [facility] for another opinion. 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 Pt was started on Carbo/pemetrexed and Keytruda. He has completed 3 cycles of this. He says he has been having stomach pains along with constipation. He took Hyoscine as was recommended by his GI which did not help. He then started taking some herbal meds for constipation which helped. Wife has been increasing fiber which is causing increased gas. \[facility]\"" has had recent scans. Presents for results. \[facility]\"" saw dermatologist and all findings benign \par \par 5/8/19: Pt here for follow up. He sustained a fall while trying to climb a ladder a few days ago, hurt his right ribs. He went to urgent care and had rib series, which showed no fx but a possible opacity related to PNA or atelectasis or mass. The pain has resolved and he has no new complaints. \par \par 5/22/19: doing well with no irAEs. Rib pain improved but still present with certain postures. Denies any other symptoms. \par \par 6/11/19: No new complaints. Feeling completely back to normal. Has some abd queasiness for a couple of days after chemo associated with some constipation. Denies any diarrhea. \par Had CT scans since last visit- excellent [place]. Report pasted below\par Since 4/1/2019: \par Left upper lobe mass has decreased in size, as has left hilar adenopathy. \par Multiple bilateral subcentimeter pulmonary nodules are unchanged. \par No acute findings. \par \par 7/23/19: Pt states he is feeling "great"! Planning to go on a vacation.  [2 - Distress Level] : Distress Level: 2 [de-identified] : adeno ca [Patient given social work contact information and resource sheet] : Patient was given social work contact information and resource sheet

## 2019-07-24 DIAGNOSIS — R11.2 NAUSEA WITH VOMITING, UNSPECIFIED: ICD-10-CM

## 2019-07-24 DIAGNOSIS — Z51.11 ENCOUNTER FOR ANTINEOPLASTIC CHEMOTHERAPY: ICD-10-CM

## 2019-07-24 DIAGNOSIS — E86.0 DEHYDRATION: ICD-10-CM

## 2019-08-01 ENCOUNTER — RX RENEWAL (OUTPATIENT)
Age: 69
End: 2019-08-01

## 2019-08-13 ENCOUNTER — LABORATORY RESULT (OUTPATIENT)
Age: 69
End: 2019-08-13

## 2019-08-13 ENCOUNTER — RESULT REVIEW (OUTPATIENT)
Age: 69
End: 2019-08-13

## 2019-08-13 ENCOUNTER — APPOINTMENT (OUTPATIENT)
Dept: INFUSION THERAPY | Facility: HOSPITAL | Age: 69
End: 2019-08-13

## 2019-08-13 ENCOUNTER — APPOINTMENT (OUTPATIENT)
Dept: HEMATOLOGY ONCOLOGY | Facility: CLINIC | Age: 69
End: 2019-08-13
Payer: MEDICARE

## 2019-08-13 VITALS
SYSTOLIC BLOOD PRESSURE: 120 MMHG | WEIGHT: 132.28 LBS | HEART RATE: 64 BPM | DIASTOLIC BLOOD PRESSURE: 70 MMHG | TEMPERATURE: 98.2 F | RESPIRATION RATE: 14 BRPM | OXYGEN SATURATION: 98 % | BODY MASS INDEX: 21.35 KG/M2

## 2019-08-13 DIAGNOSIS — L40.0 PSORIASIS VULGARIS: ICD-10-CM

## 2019-08-13 LAB
BASOPHILS # BLD AUTO: 0.1 K/UL — SIGNIFICANT CHANGE UP (ref 0–0.2)
BASOPHILS NFR BLD AUTO: 1 % — SIGNIFICANT CHANGE UP (ref 0–2)
EOSINOPHIL # BLD AUTO: 0.2 K/UL — SIGNIFICANT CHANGE UP (ref 0–0.5)
HCT VFR BLD CALC: 39.8 % — SIGNIFICANT CHANGE UP (ref 39–50)
HGB BLD-MCNC: 13.1 G/DL — SIGNIFICANT CHANGE UP (ref 13–17)
LYMPHOCYTES # BLD AUTO: 1.7 K/UL — SIGNIFICANT CHANGE UP (ref 1–3.3)
LYMPHOCYTES # BLD AUTO: 16 % — SIGNIFICANT CHANGE UP (ref 13–44)
MCHC RBC-ENTMCNC: 33 G/DL — SIGNIFICANT CHANGE UP (ref 32–36)
MCHC RBC-ENTMCNC: 33.2 PG — SIGNIFICANT CHANGE UP (ref 27–34)
MCV RBC AUTO: 101 FL — HIGH (ref 80–100)
MONOCYTES # BLD AUTO: 1.5 K/UL — HIGH (ref 0–0.9)
MONOCYTES NFR BLD AUTO: 23 % — HIGH (ref 2–14)
NEUTROPHILS # BLD AUTO: 5.3 K/UL — SIGNIFICANT CHANGE UP (ref 1.8–7.4)
NEUTROPHILS NFR BLD AUTO: 60 % — SIGNIFICANT CHANGE UP (ref 43–77)
PLAT MORPH BLD: NORMAL — SIGNIFICANT CHANGE UP
PLATELET # BLD AUTO: 396 K/UL — SIGNIFICANT CHANGE UP (ref 150–400)
RBC # BLD: 3.95 M/UL — LOW (ref 4.2–5.8)
RBC # FLD: 13.2 % — SIGNIFICANT CHANGE UP (ref 10.3–14.5)
RBC BLD AUTO: NORMAL — SIGNIFICANT CHANGE UP
WBC # BLD: 8.8 K/UL — SIGNIFICANT CHANGE UP (ref 3.8–10.5)
WBC # FLD AUTO: 8.8 K/UL — SIGNIFICANT CHANGE UP (ref 3.8–10.5)

## 2019-08-13 PROCEDURE — 99215 OFFICE O/P EST HI 40 MIN: CPT

## 2019-08-13 NOTE — ASSESSMENT
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 PDL1 unknown. \par Given excellent PS and unknown PDL1 and absence of targetable mutations, started carbo/pemetrexed and Keytruda triplet combo as per Keynote 189 study. Pt is currently on maintenance with Keytruda and Alimta. \par Tolerating well. No irAEs.\par When pt goes on vacation in October, will hold off on Alimta but continue Keytruda to avoid immunosuppression. \par CEA slightly elevated. Discussed that this is non-specific and should be corroborated with Ct scans, which I ordered today. \par We reviewed the recent images on the monitor and noted stable findings. \par OV in 1 month after scans

## 2019-08-13 NOTE — REVIEW OF SYSTEMS
[Fatigue] : fatigue [Negative] : Allergic/Immunologic [Recent Change In Weight] : ~T no recent weight change [Nosebleeds] : no nosebleeds [Chest Pain] : no chest pain [Shortness Of Breath] : no shortness of breath [Cough] : no cough

## 2019-08-13 NOTE — HISTORY OF PRESENT ILLNESS
[Disease: _____________________] : Disease: [unfilled] [T: ___] : T[unfilled] [N: ___] : N[unfilled] [M: ___] : M[unfilled] [AJCC Stage: ____] : AJCC Stage: [unfilled] [2 - Distress Level] : Distress Level: 2 [Patient given social work contact information and resource sheet] : Patient was given social work contact information and resource sheet [de-identified] : Mr. Soto is a pleasant 67 yo m with recently discovered lung mass here for consult visit. \par Pt who is very active, prior athlete, ex-, with hx of anxiety, HTN, ex-smoker (quit 2016), prior marijuana use, some occupational exposure to toxic chemicals, developed dyspnea in 2016, was diagnosed with COPD and is under the care of pulmonologists. Around end of September, developed hoarseness of voice which was thought to be laryngitis- was sent to ENT by domestic partner who is a speech pathologists. He was given medrol pack which did not help and then referred for CT chest which showed left upper lobe mass measuring 6.6 cm as well as hilar/mediastinal adenopathy and b/l nodules consistent with mets. He also had MRI brain without any contrast in November done for headache showed microvascular changes. Headaches have since resolved. Since last visit,  has seen Dr. Hernandez who discussed bronch with him but he declined. Hence, she referred him for CT guided bx of lung mass, which he had done a couple of weeks ago. 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 NGS and PDL1 are pending. He also saw Dr. Washington at [facility] for another opinion. Peripheral blood NGS was sent from [facility] and revealed KRAS mutation. Pt was started on Carbo/pemetrexed and Keytruda. He has completed 3 cycles of this. He says he has been having stomach pains along with constipation. He took Hyoscine as was recommended by his GI which did not help. He then started taking some herbal meds for constipation which helped. Wife has been increasing fiber which is causing increased gas. [facility] has had recent scans. Presents for results. [facility] saw dermatologist and all findings benign \par \par 5/8/19: Pt here for follow up. He sustained a fall while trying to climb a ladder a few days ago, hurt his right ribs. He went to urgent care and had rib series, which showed no fx but a possible opacity related to PNA or atelectasis or mass. The pain has resolved and he has no new complaints. \par \par 5/22/19: doing well with no irAEs. Rib pain improved but still present with certain postures. Denies any other symptoms. \par \par 6/11/19: No new complaints. Feeling completely back to normal. Has some abd queasiness for a couple of days after chemo associated with some constipation. Denies any diarrhea. \par Had CT scans since last visit- excellent LA. Report pasted below\par Since 4/1/2019: \par Left upper lobe mass has decreased in size, as has left hilar adenopathy. \par Multiple bilateral subcentimeter pulmonary nodules are unchanged. \par No acute findings. \par \par 7/23/19: Pt states he is feeling "great"! Planning to go on a vacation. \par \par 8/13/19: Pt had some green sputum and low grade temp last week (100.4), saw PCP- lungs were clear and his PCP did not feel that abx were indicated. Pt is here for infusion. Feels better already.  [de-identified] : adeno ca

## 2019-08-19 ENCOUNTER — RX RENEWAL (OUTPATIENT)
Age: 69
End: 2019-08-19

## 2019-08-26 ENCOUNTER — OUTPATIENT (OUTPATIENT)
Dept: OUTPATIENT SERVICES | Facility: HOSPITAL | Age: 69
LOS: 1 days | Discharge: HOME | End: 2019-08-26

## 2019-08-26 DIAGNOSIS — Z98.890 OTHER SPECIFIED POSTPROCEDURAL STATES: Chronic | ICD-10-CM

## 2019-08-26 DIAGNOSIS — C34.90 MALIGNANT NEOPLASM OF UNSPECIFIED PART OF UNSPECIFIED BRONCHUS OR LUNG: ICD-10-CM

## 2019-08-27 ENCOUNTER — LABORATORY RESULT (OUTPATIENT)
Age: 69
End: 2019-08-27

## 2019-08-27 ENCOUNTER — RESULT REVIEW (OUTPATIENT)
Age: 69
End: 2019-08-27

## 2019-08-27 ENCOUNTER — APPOINTMENT (OUTPATIENT)
Dept: INFUSION THERAPY | Facility: HOSPITAL | Age: 69
End: 2019-08-27

## 2019-08-27 LAB
BASOPHILS # BLD AUTO: 0 K/UL — SIGNIFICANT CHANGE UP (ref 0–0.2)
BASOPHILS NFR BLD AUTO: 0.4 % — SIGNIFICANT CHANGE UP (ref 0–2)
EOSINOPHIL # BLD AUTO: 0.2 K/UL — SIGNIFICANT CHANGE UP (ref 0–0.5)
EOSINOPHIL NFR BLD AUTO: 3 % — SIGNIFICANT CHANGE UP (ref 0–6)
HCT VFR BLD CALC: 38.3 % — LOW (ref 39–50)
HGB BLD-MCNC: 12.2 G/DL — LOW (ref 13–17)
LYMPHOCYTES # BLD AUTO: 1.4 K/UL — SIGNIFICANT CHANGE UP (ref 1–3.3)
LYMPHOCYTES # BLD AUTO: 21 % — SIGNIFICANT CHANGE UP (ref 13–44)
MCHC RBC-ENTMCNC: 32 G/DL — SIGNIFICANT CHANGE UP (ref 32–36)
MCHC RBC-ENTMCNC: 32.5 PG — SIGNIFICANT CHANGE UP (ref 27–34)
MCV RBC AUTO: 102 FL — HIGH (ref 80–100)
MONOCYTES # BLD AUTO: 1.4 K/UL — HIGH (ref 0–0.9)
MONOCYTES NFR BLD AUTO: 20.4 % — HIGH (ref 2–14)
NEUTROPHILS # BLD AUTO: 3.8 K/UL — SIGNIFICANT CHANGE UP (ref 1.8–7.4)
NEUTROPHILS NFR BLD AUTO: 55.2 % — SIGNIFICANT CHANGE UP (ref 43–77)
PLATELET # BLD AUTO: 327 K/UL — SIGNIFICANT CHANGE UP (ref 150–400)
RBC # BLD: 3.77 M/UL — LOW (ref 4.2–5.8)
RBC # FLD: 11.9 % — SIGNIFICANT CHANGE UP (ref 10.3–14.5)
WBC # BLD: 6.9 K/UL — SIGNIFICANT CHANGE UP (ref 3.8–10.5)
WBC # FLD AUTO: 6.9 K/UL — SIGNIFICANT CHANGE UP (ref 3.8–10.5)

## 2019-08-28 ENCOUNTER — APPOINTMENT (OUTPATIENT)
Dept: INFUSION THERAPY | Facility: HOSPITAL | Age: 69
End: 2019-08-28

## 2019-08-28 DIAGNOSIS — E86.0 DEHYDRATION: ICD-10-CM

## 2019-09-03 ENCOUNTER — LABORATORY RESULT (OUTPATIENT)
Age: 69
End: 2019-09-03

## 2019-09-03 ENCOUNTER — APPOINTMENT (OUTPATIENT)
Dept: INFUSION THERAPY | Facility: HOSPITAL | Age: 69
End: 2019-09-03

## 2019-09-03 ENCOUNTER — RESULT REVIEW (OUTPATIENT)
Age: 69
End: 2019-09-03

## 2019-09-03 LAB
BASOPHILS # BLD AUTO: 0.1 K/UL — SIGNIFICANT CHANGE UP (ref 0–0.2)
BASOPHILS NFR BLD AUTO: 1.1 % — SIGNIFICANT CHANGE UP (ref 0–2)
EOSINOPHIL # BLD AUTO: 0.2 K/UL — SIGNIFICANT CHANGE UP (ref 0–0.5)
EOSINOPHIL NFR BLD AUTO: 2.5 % — SIGNIFICANT CHANGE UP (ref 0–6)
HCT VFR BLD CALC: 34.9 % — LOW (ref 39–50)
HGB BLD-MCNC: 11.6 G/DL — LOW (ref 13–17)
LYMPHOCYTES # BLD AUTO: 1.4 K/UL — SIGNIFICANT CHANGE UP (ref 1–3.3)
LYMPHOCYTES # BLD AUTO: 16.1 % — SIGNIFICANT CHANGE UP (ref 13–44)
MCHC RBC-ENTMCNC: 33.2 PG — SIGNIFICANT CHANGE UP (ref 27–34)
MCHC RBC-ENTMCNC: 33.3 G/DL — SIGNIFICANT CHANGE UP (ref 32–36)
MCV RBC AUTO: 99.5 FL — SIGNIFICANT CHANGE UP (ref 80–100)
MONOCYTES # BLD AUTO: 1.8 K/UL — HIGH (ref 0–0.9)
MONOCYTES NFR BLD AUTO: 20.2 % — HIGH (ref 2–14)
NEUTROPHILS # BLD AUTO: 5.3 K/UL — SIGNIFICANT CHANGE UP (ref 1.8–7.4)
NEUTROPHILS NFR BLD AUTO: 60.1 % — SIGNIFICANT CHANGE UP (ref 43–77)
PLATELET # BLD AUTO: 502 K/UL — HIGH (ref 150–400)
RBC # BLD: 3.51 M/UL — LOW (ref 4.2–5.8)
RBC # FLD: 12 % — SIGNIFICANT CHANGE UP (ref 10.3–14.5)
WBC # BLD: 8.8 K/UL — SIGNIFICANT CHANGE UP (ref 3.8–10.5)
WBC # FLD AUTO: 8.8 K/UL — SIGNIFICANT CHANGE UP (ref 3.8–10.5)

## 2019-09-04 DIAGNOSIS — Z51.11 ENCOUNTER FOR ANTINEOPLASTIC CHEMOTHERAPY: ICD-10-CM

## 2019-09-04 DIAGNOSIS — R11.2 NAUSEA WITH VOMITING, UNSPECIFIED: ICD-10-CM

## 2019-09-09 ENCOUNTER — FORM ENCOUNTER (OUTPATIENT)
Age: 69
End: 2019-09-09

## 2019-09-10 ENCOUNTER — APPOINTMENT (OUTPATIENT)
Dept: CT IMAGING | Facility: IMAGING CENTER | Age: 69
End: 2019-09-10
Payer: MEDICARE

## 2019-09-10 ENCOUNTER — OUTPATIENT (OUTPATIENT)
Dept: OUTPATIENT SERVICES | Facility: HOSPITAL | Age: 69
LOS: 1 days | End: 2019-09-10
Payer: MEDICARE

## 2019-09-10 DIAGNOSIS — Z98.890 OTHER SPECIFIED POSTPROCEDURAL STATES: Chronic | ICD-10-CM

## 2019-09-10 DIAGNOSIS — C34.90 MALIGNANT NEOPLASM OF UNSPECIFIED PART OF UNSPECIFIED BRONCHUS OR LUNG: ICD-10-CM

## 2019-09-10 PROCEDURE — 74160 CT ABDOMEN W/CONTRAST: CPT | Mod: 26

## 2019-09-10 PROCEDURE — 71260 CT THORAX DX C+: CPT

## 2019-09-10 PROCEDURE — 71260 CT THORAX DX C+: CPT | Mod: 26

## 2019-09-10 PROCEDURE — 74160 CT ABDOMEN W/CONTRAST: CPT

## 2019-09-17 ENCOUNTER — APPOINTMENT (OUTPATIENT)
Dept: HEMATOLOGY ONCOLOGY | Facility: CLINIC | Age: 69
End: 2019-09-17
Payer: MEDICARE

## 2019-09-17 VITALS
SYSTOLIC BLOOD PRESSURE: 131 MMHG | BODY MASS INDEX: 21.71 KG/M2 | RESPIRATION RATE: 15 BRPM | OXYGEN SATURATION: 96 % | TEMPERATURE: 97.4 F | HEART RATE: 65 BPM | DIASTOLIC BLOOD PRESSURE: 82 MMHG | WEIGHT: 134.48 LBS

## 2019-09-17 PROCEDURE — 99215 OFFICE O/P EST HI 40 MIN: CPT

## 2019-09-17 NOTE — HISTORY OF PRESENT ILLNESS
[Disease: _____________________] : Disease: [unfilled] [T: ___] : T[unfilled] [M: ___] : M[unfilled] [N: ___] : N[unfilled] [AJCC Stage: ____] : AJCC Stage: [unfilled] [de-identified] : Mr. Soot is a pleasant 67 yo m with recently discovered lung mass here for consult visit. \par Pt who is very active, prior athlete, ex-, with hx of anxiety, HTN, ex-smoker (quit 2016), prior marijuana use, some occupational exposure to toxic chemicals, developed dyspnea in 2016, was diagnosed with COPD and is under the care of pulmonologists. Around end of September, developed hoarseness of voice which was thought to be laryngitis- was sent to ENT by domestic partner who is a speech pathologists. He was given medrol pack which did not help and then referred for CT chest which showed left upper lobe mass measuring 6.6 cm as well as hilar/mediastinal adenopathy and b/l nodules consistent with mets. He also had MRI brain without any contrast in November done for headache showed microvascular changes. Headaches have since resolved. Since last visit,  has seen Dr. Hernandez who discussed bronch with him but he declined. Hence, she referred him for CT guided bx of lung mass, which he had done a couple of weeks ago. 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 NGS and PDL1 are pending. He also saw Dr. Washington at [facility] for another opinion. 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 Pt was started on Carbo/pemetrexed and Keytruda. He has completed 3 cycles of this. He says he has been having stomach pains along with constipation. He took Hyoscine as was recommended by his GI which did not help. He then started taking some herbal meds for constipation which helped. Wife has been increasing fiber which is causing increased gas. [facility] has had recent scans. Presents for results. [facility] saw dermatologist and all findings benign \par \par 5/8/19: Pt here for follow up. He sustained a fall while trying to climb a ladder a few days ago, hurt his right ribs. He went to urgent care and had rib series, which showed no fx but a possible opacity related to PNA or atelectasis or mass. The pain has resolved and he has no new complaints. \par \par 5/22/19: doing well with no irAEs. Rib pain improved but still present with certain postures. Denies any other symptoms. \par \par 6/11/19: No new complaints. Feeling completely back to normal. Has some abd queasiness for a couple of days after chemo associated with some constipation. Denies any diarrhea. \par Had CT scans since last visit- excellent [place]. Report pasted below\par Since 4/1/2019: \par Left upper lobe mass has decreased in size, as has left hilar adenopathy. \par Multiple bilateral subcentimeter pulmonary nodules are unchanged. \par No acute findings. \par \par 7/23/19: Pt states he is feeling "great"! Planning to go on a vacation. \par \par 8/13/19: Pt had some green sputum and low grade temp last week (100.4), saw PCP- lungs were clear and his PCP did not feel that abx were indicated. Pt is here for infusion. Feels better already. \par \par 9/17/19: Had an accident at home- broke some toes on rt. Has episodes of sore throat- and some cough. He was prescribed abx by Randee. He has had CT scans since last visit.  [2 - Distress Level] : Distress Level: 2 [de-identified] : adeno ca [Patient given social work contact information and resource sheet] : Patient was given social work contact information and resource sheet

## 2019-09-17 NOTE — REVIEW OF SYSTEMS
[Recent Change In Weight] : ~T no recent weight change [Fatigue] : fatigue [Chest Pain] : no chest pain [Nosebleeds] : no nosebleeds [Cough] : cough [Shortness Of Breath] : no shortness of breath [FreeTextEntry9] : as above [Negative] : Allergic/Immunologic

## 2019-09-17 NOTE — ASSESSMENT
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 PDL1 unknown. \par Given excellent PS and unknown PDL1 and absence of targetable mutations, started carbo/pemetrexed and Keytruda triplet combo as per Keynote 189 study. Pt is currently on maintenance with Keytruda and Alimta. \par Tolerating well. No irAEs.\par When pt goes on vacation in October, will hold off on Alimta but continue Keytruda to avoid immunosuppression. \par CEA slightly elevated.\par Recent CT images reviewed. Dominant nodule in the lung but all other measurable lesions have improved. \par Refer to rad onc for possible SBRT for oligoprogression. \par If pt not a radiation candidate, may be a candidate for KRAS TKI clinical trial. \par OV in 3 weeks

## 2019-09-23 ENCOUNTER — LABORATORY RESULT (OUTPATIENT)
Age: 69
End: 2019-09-23

## 2019-09-23 ENCOUNTER — OUTPATIENT (OUTPATIENT)
Dept: OUTPATIENT SERVICES | Facility: HOSPITAL | Age: 69
LOS: 1 days | Discharge: HOME | End: 2019-09-23
Payer: MEDICARE

## 2019-09-23 DIAGNOSIS — Z98.890 OTHER SPECIFIED POSTPROCEDURAL STATES: Chronic | ICD-10-CM

## 2019-09-24 ENCOUNTER — RESULT REVIEW (OUTPATIENT)
Age: 69
End: 2019-09-24

## 2019-09-24 ENCOUNTER — APPOINTMENT (OUTPATIENT)
Dept: RADIATION ONCOLOGY | Facility: CLINIC | Age: 69
End: 2019-09-24
Payer: MEDICARE

## 2019-09-24 ENCOUNTER — APPOINTMENT (OUTPATIENT)
Dept: HEMATOLOGY ONCOLOGY | Facility: CLINIC | Age: 69
End: 2019-09-24
Payer: MEDICARE

## 2019-09-24 ENCOUNTER — APPOINTMENT (OUTPATIENT)
Dept: INFUSION THERAPY | Facility: HOSPITAL | Age: 69
End: 2019-09-24

## 2019-09-24 VITALS
HEART RATE: 74 BPM | WEIGHT: 136.79 LBS | RESPIRATION RATE: 17 BRPM | BODY MASS INDEX: 20.49 KG/M2 | DIASTOLIC BLOOD PRESSURE: 77 MMHG | SYSTOLIC BLOOD PRESSURE: 114 MMHG | TEMPERATURE: 97.7 F | HEIGHT: 68.5 IN | OXYGEN SATURATION: 93 %

## 2019-09-24 LAB
BASOPHILS # BLD AUTO: 0 K/UL — SIGNIFICANT CHANGE UP (ref 0–0.2)
BASOPHILS NFR BLD AUTO: 0.5 % — SIGNIFICANT CHANGE UP (ref 0–2)
EOSINOPHIL # BLD AUTO: 0.2 K/UL — SIGNIFICANT CHANGE UP (ref 0–0.5)
EOSINOPHIL NFR BLD AUTO: 2.4 % — SIGNIFICANT CHANGE UP (ref 0–6)
HCT VFR BLD CALC: 37.8 % — LOW (ref 39–50)
HGB BLD-MCNC: 12.5 G/DL — LOW (ref 13–17)
LYMPHOCYTES # BLD AUTO: 1.5 K/UL — SIGNIFICANT CHANGE UP (ref 1–3.3)
LYMPHOCYTES # BLD AUTO: 16.2 % — SIGNIFICANT CHANGE UP (ref 13–44)
MCHC RBC-ENTMCNC: 32.9 PG — SIGNIFICANT CHANGE UP (ref 27–34)
MCHC RBC-ENTMCNC: 33 G/DL — SIGNIFICANT CHANGE UP (ref 32–36)
MCV RBC AUTO: 99.6 FL — SIGNIFICANT CHANGE UP (ref 80–100)
MONOCYTES # BLD AUTO: 1.2 K/UL — HIGH (ref 0–0.9)
MONOCYTES NFR BLD AUTO: 13.7 % — SIGNIFICANT CHANGE UP (ref 2–14)
NEUTROPHILS # BLD AUTO: 6.1 K/UL — SIGNIFICANT CHANGE UP (ref 1.8–7.4)
NEUTROPHILS NFR BLD AUTO: 67.2 % — SIGNIFICANT CHANGE UP (ref 43–77)
PLATELET # BLD AUTO: 516 K/UL — HIGH (ref 150–400)
RBC # BLD: 3.79 M/UL — LOW (ref 4.2–5.8)
RBC # FLD: 13.3 % — SIGNIFICANT CHANGE UP (ref 10.3–14.5)
WBC # BLD: 9.1 K/UL — SIGNIFICANT CHANGE UP (ref 3.8–10.5)
WBC # FLD AUTO: 9.1 K/UL — SIGNIFICANT CHANGE UP (ref 3.8–10.5)

## 2019-09-24 PROCEDURE — 99214 OFFICE O/P EST MOD 30 MIN: CPT

## 2019-09-24 PROCEDURE — 99205 OFFICE O/P NEW HI 60 MIN: CPT | Mod: GC,25

## 2019-09-24 RX ORDER — LEVOFLOXACIN 500 MG/1
500 TABLET, FILM COATED ORAL DAILY
Qty: 10 | Refills: 0 | Status: DISCONTINUED | COMMUNITY
Start: 2019-08-19 | End: 2019-09-24

## 2019-09-24 NOTE — REASON FOR VISIT
[Consideration for Non-Curative Therapy] : consideration for non-curative therapy for [Lung Cancer] : lung cancer

## 2019-09-24 NOTE — VITALS
[Maximal Pain Intensity: 0/10] : 0/10 [Least Pain Intensity: 0/10] : 0/10 [80: Normal activity with effort; some signs or symptoms of disease.] : 80: Normal activity with effort; some signs or symptoms of disease.  [ECOG Performance Status: 0 - Fully active, able to carry on all pre-disease performance without restriction] : Performance Status: 0 - Fully active, able to carry on all pre-disease performance without restriction [Date: ____________] : Patient's last distress assessment performed on [unfilled]. [3 - Distress Level] : Distress Level: 3 [Patient given social work contact information and resource sheet] : Patient was given social work contact information and resource sheet

## 2019-09-24 NOTE — HISTORY OF PRESENT ILLNESS
[Disease: _____________________] : Disease: [unfilled] [T: ___] : T[unfilled] [N: ___] : N[unfilled] [M: ___] : M[unfilled] [de-identified] : Mr. Soto is a pleasant 67 yo m with recently discovered lung mass here for consult visit. \par Pt who is very active, prior athlete, ex-, with hx of anxiety, HTN, ex-smoker (quit 2016), prior marijuana use, some occupational exposure to toxic chemicals, developed dyspnea in 2016, was diagnosed with COPD and is under the care of pulmonologists. Around end of September, developed hoarseness of voice which was thought to be laryngitis- was sent to ENT by domestic partner who is a speech pathologists. He was given medrol pack which did not help and then referred for CT chest which showed left upper lobe mass measuring 6.6 cm as well as hilar/mediastinal adenopathy and b/l nodules consistent with mets. He also had MRI brain without any contrast in November done for headache showed microvascular changes. Headaches have since resolved. Since last visit,  has seen Dr. Hernandez who discussed bronch with him but he declined. Hence, she referred him for CT guided bx of lung mass, which he had done a couple of weeks ago. 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 NGS and PDL1 are pending. He also saw Dr. Washington at [facility] for another opinion. Peripheral blood NGS was sent from [facility] and revealed KRAS mutation. Pt was started on Carbo/pemetrexed and Keytruda. He has completed 3 cycles of this. He says he has been having stomach pains along with constipation. He took Hyoscine as was recommended by his GI which did not help. He then started taking some herbal meds for constipation which helped. Wife has been increasing fiber which is causing increased gas. [facility]. Presents for results. [facility] saw dermatologist and all findings benign \par \par 5/8/19: Pt here for follow up. He sustained a fall while trying to climb a ladder a few days ago, hurt his right ribs. He went to urgent care and had rib series, which showed no fx but a possible opacity related to PNA or atelectasis or mass. The pain has resolved and he has no new complaints. \par \par 5/22/19: doing well with no irAEs. Rib pain improved but still present with certain postures. Denies any other symptoms. \par \par 6/11/19: No new complaints. Feeling completely back to normal. Has some abd queasiness for a couple of days after chemo associated with some constipation. Denies any diarrhea. \par Had CT scans since last visit- excellent LA. Report pasted below\par Since 4/1/2019: \par Left upper lobe mass has decreased in size, as has left hilar adenopathy. \par Multiple bilateral subcentimeter pulmonary nodules are unchanged. \par No acute findings. \par \par 7/23/19: Pt states he is feeling "great"! Planning to go on a vacation. \par \par 8/13/19: Pt had some green sputum and low grade temp last week (100.4), saw PCP- lungs were clear and his PCP did not feel that abx were indicated. Pt is here for infusion. Feels better already. \par \par 9/17/19: Had an accident at home- broke some toes on rt. Has episodes of sore throat- and some cough. He was prescribed abx by Randee. He has had CT scans since last visit.\par \par 9/24/19: Pt returned after having a consultation with Dr. Blanchard for discussion about possible radiation. Mr. Soto is slightly more anxious about treatment but is determined to "do what he has to do".   [AJCC Stage: ____] : AJCC Stage: [unfilled] [de-identified] : adeno ca [2 - Distress Level] : Distress Level: 2 [Patient given social work contact information and resource sheet] : Patient was given social work contact information and resource sheet

## 2019-09-24 NOTE — ASSESSMENT
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 PDL1 unknown. \par Given excellent PS and unknown PDL1 and absence of targetable mutations, started carbo/pemetrexed and Keytruda triplet combo as per Keynote 189 study. Pt is currently on maintenance with Keytruda and Alimta. \par Tolerating well. No irAEs.Recent CT images reviewed. Dominant nodule in the lung but all other measurable lesions have improved. \par PET/CT for staging for possible SBRT for oligoprogression. Will f/u with Dr. Blanchard following imaging.\par Will likely continue alimta/ Keytruda \par Pt may seek options for clinical trial at [facility]\par OV in 3 weeks [Palliative] : Goals of care discussed with patient: Palliative

## 2019-09-24 NOTE — REVIEW OF SYSTEMS
[Fatigue] : fatigue [Recent Change In Weight] : ~T no recent weight change [Nosebleeds] : no nosebleeds [Shortness Of Breath] : no shortness of breath [Chest Pain] : no chest pain [Cough] : cough [Negative] : Heme/Lymph [FreeTextEntry9] : as above

## 2019-09-25 ENCOUNTER — FORM ENCOUNTER (OUTPATIENT)
Age: 69
End: 2019-09-25

## 2019-09-26 ENCOUNTER — APPOINTMENT (OUTPATIENT)
Dept: NUCLEAR MEDICINE | Facility: IMAGING CENTER | Age: 69
End: 2019-09-26
Payer: MEDICARE

## 2019-09-26 ENCOUNTER — OUTPATIENT (OUTPATIENT)
Dept: OUTPATIENT SERVICES | Facility: HOSPITAL | Age: 69
LOS: 1 days | End: 2019-09-26
Payer: MEDICARE

## 2019-09-26 DIAGNOSIS — C34.90 MALIGNANT NEOPLASM OF UNSPECIFIED PART OF UNSPECIFIED BRONCHUS OR LUNG: ICD-10-CM

## 2019-09-26 DIAGNOSIS — Z98.890 OTHER SPECIFIED POSTPROCEDURAL STATES: Chronic | ICD-10-CM

## 2019-09-26 PROCEDURE — 78815 PET IMAGE W/CT SKULL-THIGH: CPT

## 2019-09-26 PROCEDURE — 78815 PET IMAGE W/CT SKULL-THIGH: CPT | Mod: 26,PS

## 2019-09-26 PROCEDURE — A9552: CPT

## 2019-10-01 ENCOUNTER — OUTPATIENT (OUTPATIENT)
Dept: OUTPATIENT SERVICES | Facility: HOSPITAL | Age: 69
LOS: 1 days | Discharge: HOME | End: 2019-10-01

## 2019-10-01 DIAGNOSIS — Z98.890 OTHER SPECIFIED POSTPROCEDURAL STATES: Chronic | ICD-10-CM

## 2019-10-01 DIAGNOSIS — C34.90 MALIGNANT NEOPLASM OF UNSPECIFIED PART OF UNSPECIFIED BRONCHUS OR LUNG: ICD-10-CM

## 2019-10-02 ENCOUNTER — APPOINTMENT (OUTPATIENT)
Dept: THORACIC SURGERY | Facility: CLINIC | Age: 69
End: 2019-10-02
Payer: MEDICARE

## 2019-10-02 VITALS
HEIGHT: 68 IN | DIASTOLIC BLOOD PRESSURE: 75 MMHG | RESPIRATION RATE: 16 BRPM | WEIGHT: 131 LBS | BODY MASS INDEX: 19.85 KG/M2 | OXYGEN SATURATION: 95 % | TEMPERATURE: 97.8 F | HEART RATE: 61 BPM | SYSTOLIC BLOOD PRESSURE: 105 MMHG

## 2019-10-02 PROCEDURE — 99205 OFFICE O/P NEW HI 60 MIN: CPT

## 2019-10-02 RX ORDER — CETIRIZINE HCL 10 MG
10 TABLET ORAL
Refills: 0 | Status: ACTIVE | COMMUNITY

## 2019-10-03 ENCOUNTER — APPOINTMENT (OUTPATIENT)
Dept: HEMATOLOGY ONCOLOGY | Facility: CLINIC | Age: 69
End: 2019-10-03

## 2019-10-03 ENCOUNTER — APPOINTMENT (OUTPATIENT)
Dept: RADIATION ONCOLOGY | Facility: CLINIC | Age: 69
End: 2019-10-03
Payer: MEDICARE

## 2019-10-03 VITALS
SYSTOLIC BLOOD PRESSURE: 103 MMHG | HEART RATE: 75 BPM | OXYGEN SATURATION: 99 % | DIASTOLIC BLOOD PRESSURE: 66 MMHG | TEMPERATURE: 97.8 F

## 2019-10-03 DIAGNOSIS — F43.20 ADJUSTMENT DISORDER, UNSPECIFIED: ICD-10-CM

## 2019-10-03 PROCEDURE — 99215 OFFICE O/P EST HI 40 MIN: CPT | Mod: 25

## 2019-10-03 NOTE — PHYSICAL EXAM
[General Appearance - Alert] : alert [General Appearance - Well Nourished] : well nourished [General Appearance - In No Acute Distress] : in no acute distress [Sclera] : the sclera and conjunctiva were normal [General Appearance - Well Developed] : well developed [Outer Ear] : the ears and nose were normal in appearance [Hearing Threshold Finger Rub Not [place]] : hearing was normal [] : the neck was supple [Neck Appearance] : the appearance of the neck was normal [Respiration, Rhythm And Depth] : normal respiratory rhythm and effort [Neck Cervical Mass (___cm)] : no neck mass was observed [Heart Rate And Rhythm] : heart rate was normal and rhythm regular [Auscultation Breath Sounds / Voice Sounds] : lungs were clear to auscultation bilaterally [Heart Sounds] : normal S1 and S2 [Examination Of The Chest] : the chest was normal in appearance [Abdomen Soft] : soft [Abdomen Tenderness] : non-tender [Cervical Lymph Nodes Enlarged Posterior Bilaterally] : posterior cervical [Cervical Lymph Nodes Enlarged Anterior Bilaterally] : anterior cervical [Supraclavicular Lymph Nodes Enlarged Bilaterally] : supraclavicular [No CVA Tenderness] : no ~M costovertebral angle tenderness [Abnormal Walk] : normal gait [Skin Color & Pigmentation] : normal skin color and pigmentation [No Focal Deficits] : no focal deficits [Oriented To Time, Place, And Person] : oriented to person, place, and time [Impaired Insight] : insight and judgment were intact [Affect] : the affect was normal [FreeTextEntry1] : deferred

## 2019-10-03 NOTE — REVIEW OF SYSTEMS
[Recent Weight Gain (___ Lbs)] : recent [unfilled] ~Ulb weight gain [Cough] : cough [SOB on Exertion] : shortness of breath during exertion [Constipation] : constipation [Sleep Disturbances] : sleep disturbances [Anxiety] : anxiety [Negative] : Eyes [Fever] : no fever [Chills] : no chills [Feeling Poorly] : not feeling poorly [Feeling Tired] : not feeling tired [Shortness Of Breath] : no shortness of breath [Wheezing] : no wheezing [Abdominal Pain] : no abdominal pain [Vomiting] : no vomiting [Diarrhea] : no diarrhea [Heartburn] : no heartburn [Melena] : no melena [Suicidal] : not suicidal [Depression] : no depression [FreeTextEntry2] : weakness days 6-10 after chemotherapy tx's [FreeTextEntry6] : cough in AM related to postnasal drip [FreeTextEntry9] : cervical spine nerve damage [de-identified] : B/L hands numbness/tingling and B/L feet

## 2019-10-03 NOTE — CONSULT LETTER
[Dear  ___] : Dear  [unfilled], [Consult Letter:] : I had the pleasure of evaluating your patient, [unfilled]. [Please see my note below.] : Please see my note below. [Consult Closing:] : Thank you very much for allowing me to participate in the care of this patient.  If you have any questions, please do not hesitate to contact me. [Sincerely,] : Sincerely, [FreeTextEntry2] : Dr. Milka Rodgers (Onc/Ref)\par Dr. Cj Kay (PCP)\par Dr. Tree Amaro (Pulm)\par Dr. Claudio Blanchard (RadOnc) [FreeTextEntry3] : \par \par \par Abdulkadir Hannah MD, FACS \par , Division of Thoracic Surgery \par [facility] \par Chief, Thoracic Surgery \par [facility] \par Department of Cardiovascular & Thoracic Surgery \par  \par [facility] [employer] [facility]

## 2019-10-03 NOTE — ASSESSMENT
[FreeTextEntry1] : Mr. VICK ALLEN is a 68 year old male presenting for initial consultation.  He has history of metastatic lung adenocarcinoma and was referred for evaluation by Dr. Rodgers.  \par \par He is a former smoker (Quit 2016) with extensive second hand smoke exposure and exposure to toxic chemicals.  He developed laryngitis, saw ENT and was given Medrol pack without improvement.  Chest CT revealed a SHENA mass measuring 6.6 cm as well as hilar/mediastinal adenopathy and b/l nodules consistent with mets. He saw Dr. Hernandez but refused bronchoscopy and was sent for CT guided SHENA biopsy on 2/14/19 which was positive for malignant cells, non-small cell carcinoma, immunophenotypically consistent with primary pulmonary adenocarcinoma. Chemotherapy/Immunotherapy treatments started on 2/26/19.  Most recent Chest CT Scan revealed an increase in the size of the SHENA nodule and her was referred by his oncologist to see Radiation Oncology (Dr. Blanchard) and referred to see oncologist Dr. Terry Washington at [facility] to discuss a clinical trial.  \par \par PET/CT on 9/26/19 revealed:\par - THORAX: Near complete resolution of left perihilar and aorticopulmonary window lymphadenopathy, with minimal residual FDG avidity in the AP window (SUV 4.3; image 85), previously 3.0 x 2.5 cm, SUV 19.1). \par - LUNGS: FDG avid centrally necrotic SHENA mass 9 x 4.0 cm (SUV 14.6; image 67), increased in size since 6/5/19 where it measured approximately 3.4 x 2.6 cm, but overall decreased in size PET/CT 1/29/19 where it measured 6.3 x 4.8 cm with SUV 24.8. \par - Several subcentimeter pulmonary nodules are unchanged for example measuring 0.6 cm LLL (image 84), while several other subcentimeter FDG avid nodules in the left lower lobe are no longer identified, (i.e. previous 0.6 cm LLL nodule with SUV 3.1). Unchanged non-FDG avid partial right middle lobe atelectasis. \par - PLEURA/PERICARDIUM: No abnormal avidity. No pericardial or pleural effusions. \par - ADRENAL GLANDS: FDG avid focus localizing to the anterior limb right adrenal gland corresponding to a 0.8 cm nodule which is better seen on contrast enhanced CT of 9/10/19 (SUV 5.2; image 136). \par \par Chest CT on 9/10/19:\par - LUNGS AND LARGE AIRWAYS: Patent central airways. Spiculated SHENA mass has increased in size, measuring 4.4 x 4.2 cm, previously 3.4 x 2.6 cm. The mass abuts the mediastinum and extends towards the pleura. B/L subcentimeter pulmonary nodules are again noted. A reference 0.5 cm LLL nodule (2:33) is unchanged. Again noted, is partial RML atelectasis. Centrilobular emphysema. Nonspecific reticular opacities in the left upper and lower lobes, new since 6/5/2019.  No pleural effusion. \par MEDIASTINUM AND AMAURI: Left hilar and aortopulmonary window adenopathy is again noted. A reference lymph node (2:31) measures 1.5 x 1.3 cm, previously 1.9 x 1.5 cm. \par CHEST WALL / LOWER NECK: Small left supraclavicular lymph nodes, the largest measuring 0.7 cm (2:7). \par ADRENALS: Nodular thickening of the adrenal glands bilaterally. \par \par I have reviewed the patient's medical records and diagnostic images during the time of this office visit, and I do not feel that thoracic surgery is warranted at this time.  I have recommended that he follow up with his oncologist Dr. Rodgers to discuss further treatment options.  He also informed me that he has an appointment with Dr. Blanchard to discuss radiation therapy and he was encouraged to keep that follow up appointment . \par \par Written by Renetta Argueta NP, acting as a scribe for Haydee Guerra MD.\par \par The documentation recorded by the scribe accurately reflects the service I personally performed and the decisions made by me. HAYDEE GUERRA MD\par

## 2019-10-03 NOTE — HISTORY OF PRESENT ILLNESS
[FreeTextEntry1] : Mr. VICK ALLEN is a 68 year old male presenting for initial consultation.  He has history of metastatic lung adenocarcinoma and was referred for evaluation by Dr. Rodgers.  \par \par He is a former smoker (Quit 2016) with extensive second hand smoke exposure and exposure to toxic chemicals.  He developed laryngitis, saw ENT and was given Medrol pack without improvement.  Chest CT revealed a SHENA mass measuring 6.6 cm as well as hilar/mediastinal adenopathy and b/l nodules consistent with mets. He saw Dr. Hernandez but refused bronchoscopy and was sent for CT guided SHENA biopsy on 2/14/19 which was positive for malignant cells, non-small cell carcinoma, immunophenotypically consistent with primary pulmonary adenocarcinoma. Chemotherapy/Immunotherapy treatments started on 2/26/19.  Most recent Chest CT Scan revealed an increase in the size of the SHENA nodule and her was referred by his oncologist to see Radiation Oncology (Dr. Blanchard) and referred to see oncologist Dr. Terry Washington at [facility] to discuss a clinical trial.  \par \par PET/CT on 9/26/19 revealed:\par - THORAX: Near complete resolution of left perihilar and aorticopulmonary window lymphadenopathy, with minimal residual FDG avidity in the AP window (SUV 4.3; image 85), previously 3.0 x 2.5 cm, SUV 19.1). \par - LUNGS: FDG avid centrally necrotic SHENA mass 9 x 4.0 cm (SUV 14.6; image 67), increased in size since 6/5/19 where it measured approximately 3.4 x 2.6 cm, but overall decreased in size PET/CT 1/29/19 where it measured 6.3 x 4.8 cm with SUV 24.8. \par - Several subcentimeter pulmonary nodules are unchanged for example measuring 0.6 cm LLL (image 84), while several other subcentimeter FDG avid nodules in the left lower lobe are no longer identified, (i.e. previous 0.6 cm LLL nodule with SUV 3.1). Unchanged non-FDG avid partial right middle lobe atelectasis. \par - PLEURA/PERICARDIUM: No abnormal avidity. No pericardial or pleural effusions. \par - ADRENAL GLANDS: FDG avid focus localizing to the anterior limb right adrenal gland corresponding to a 0.8 cm nodule which is better seen on contrast enhanced CT of 9/10/19 (SUV 5.2; image 136). \par \par Chest CT on 9/10/19:\par - LUNGS AND LARGE AIRWAYS: Patent central airways. Spiculated SHENA mass has increased in size, measuring 4.4 x 4.2 cm, previously 3.4 x 2.6 cm. The mass abuts the mediastinum and extends towards the pleura. B/L subcentimeter pulmonary nodules are again noted. A reference 0.5 cm LLL nodule (2:33) is unchanged. Again noted, is partial RML atelectasis. Centrilobular emphysema. Nonspecific reticular opacities in the left upper and lower lobes, new since 6/5/2019. \par PLEURA: No pleural effusion. \par MEDIASTINUM AND AMAURI: Left hilar and aortopulmonary window adenopathy is again noted. A reference lymph node (2:31) measures 1.5 x 1.3 cm, previously 1.9 x 1.5 cm. \par CHEST WALL / LOWER NECK: Small left supraclavicular lymph nodes, the largest measuring 0.7 cm (2:7). \par ADRENALS: Nodular thickening of the adrenal glands bilaterally. \par \par He presents today accompanied by his significant other.  He complains of weakness for several days after his chemo treatments, shortness of breath with exertion and cough in the AM which he attributes to his postnasal drip. \par

## 2019-10-07 PROBLEM — F43.20 ADJUSTMENT DISORDER: Status: ACTIVE | Noted: 2019-10-07

## 2019-10-08 PROCEDURE — 77263 THER RADIOLOGY TX PLNG CPLX: CPT

## 2019-10-09 ENCOUNTER — OUTPATIENT (OUTPATIENT)
Dept: OUTPATIENT SERVICES | Facility: HOSPITAL | Age: 69
LOS: 1 days | Discharge: HOME | End: 2019-10-09
Payer: MEDICARE

## 2019-10-09 DIAGNOSIS — Z98.890 OTHER SPECIFIED POSTPROCEDURAL STATES: Chronic | ICD-10-CM

## 2019-10-11 ENCOUNTER — APPOINTMENT (OUTPATIENT)
Dept: HEMATOLOGY ONCOLOGY | Facility: CLINIC | Age: 69
End: 2019-10-11

## 2019-10-14 PROCEDURE — 77334 RADIATION TREATMENT AID(S): CPT | Mod: 26

## 2019-10-14 PROCEDURE — 77290 THER RAD SIMULAJ FIELD CPLX: CPT | Mod: 26

## 2019-10-14 NOTE — REVIEW OF SYSTEMS
[Cough] : cough [Constipation] : constipation [Easy Bruising] : a tendency for easy bruising [Negative] : Neurological [Constipation: Grade 1 - Occasional or intermittent symptoms; occasional use of stool softeners, laxatives, dietary modification, or enema] : Constipation: Grade 1 - Occasional or intermittent symptoms; occasional use of stool softeners, laxatives, dietary modification, or enema [Diarrhea: Grade 0] : Diarrhea: Grade 0 [Dysphagia: Grade 0] : Dysphagia: Grade 0 [Nausea: Grade 0] : Nausea: Grade 0 [Vomiting: Grade 0] : Vomiting: Grade 0 [Fatigue: Grade 0] : Fatigue: Grade 0 [Cough: Grade 1 - Mild symptoms; nonprescription intervention indicated] : Cough: Grade 1 - Mild symptoms; nonprescription intervention indicated [Dyspnea: Grade 1 - Shortness of breath with moderate exertion] : Dyspnea: Grade 1 - Shortness of breath with moderate exertion [Hoarseness: Grade 1 - Mild or intermittent voice change; fully understandable; self-resolves] : Hoarseness: Grade 1 - Mild or intermittent voice change; fully understandable; self-resolves [FreeTextEntry6] : dry

## 2019-10-14 NOTE — HISTORY OF PRESENT ILLNESS
[FreeTextEntry1] : Mr. Alvarado Soto is a 68 year-old male with T3N3M1 adenocarcinoma of the lung currently receiving carbo, pemetrexed, keytruda under care of Dr. Rodgers with recent imaging showing progression of a left upper lobe lung mass. KRAS+/PDL1 70%\par \par Mr. 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 Around end of September 2018, he developed hoarseness of voice which was thought to be laryngitis- was sent to ENT by domestic partner who is a speech pathologists. He was given medrol pack which did not help and then referred for CT chest which showed left upper lobe mass measuring 6.6 cm as well as hilar/mediastinal adenopathy and b/l nodules consistent with mets. \par \par On 11/2/18, he had an MRI brain without contrast which showed microvascular changes. \par \par On 1/16/19, he underwent a CT chest w/ contrat which showed a 6.0 x 4.5 x 5.5 irregular solid masss in the left apex and a 3.2 cm metastatic deposit in the AP window. \par \par On 1/29/19, he underwent a PET/CT which showed 1. Hypermetabolic left upper lobe lung mass with surrounding groundglass measures 6.4 x 4.3 cm (SUV 24.0; image 73). Hypermetabolism extends to the medial and anterior pleura. 2. Hypermetabolic left perihilar and AP window lymphadenopathy, as seen on recent contrast-enhanced CT. For reference, AP window node measures approximately 3.0 x 2.5 cm (SUV 19.1; image 86). 3. Subcentimeter bilateral pulmonary nodules, unchanged as compared to CT dated 1/16/2019, are compatible with metastatic disease. 4. Left vocal cord paralysis secondary to AP window lymphadenopathy. \par \par On 2/14/19, he underwent a CT guided biopsy of the left upper lobe mass and pathology revealed non-small cell lung adenocarcinoma\par \par On 2/26/19, he was started on Carbo/pemetrexed and Keytruda under care of Dr. Rodgers\par \par On 4/2/19, he underwent a CT chest/abdomen w/ contrast which showed a Left upper lobe mass, consistent with the history of lung carcinoma, slightly decreased in size since 1/29/2019. Left perihilar and aortopulmonary window lymphadenopathy is also slightly decreased in size. Bilateral pulmonary nodules, many of which are unchanged. However, a previously noted reference left lower lobe nodule is slightly decreased in size.\par  \par On 6/5/19, he underwent a nuclear bone scan which showed posttraumatic changes to his right anterior ribs and no evidence of osseous metastases. He also underwent a CT chest/abdomen with contrast which showed the left upper lobe mass has decreased in size, as has left hilar adenopathy. Multiple bilateral subcentimeter pulmonary nodules are unchanged. \par \par On 9/10/19, he underwent a CT chest/abdomen w/ contrast which showed the left upper lobe mass, increased in size since 6/5/2019, now measuring 4.4 x 4.2 cm. Left hilar/aortopulmonary window lymphadenopathy is stable to mildly decreased in size. Subcentimeter left supraclavicular lymph nodes. Bilateral subcentimeter pulmonary nodules, not significantly changed.  Small pericardial effusion. \par \par On 9/26/19, he underwent a PET/CT which showed 1. FDG avid centrally necrotic left upper lobe mass increased in size since CT chest June 5, 2019, but overall decreased since PET/CT January 29, 2019. FDG avid centrally necrotic left upper lobe mass 9 x 4.0 cm (SUV 14.6; image 67), increased in size since June 5, 2019 where it measured approximately 3.4 x 2.6 cm, but overall decreased in size PET/CT January 29, 2019 where it measured 6.3 x 4.8 cm with SUV 24.8. 2. Near complete resolution of FDG avid left perihilar/aorticopulmonary metastatic lymphadenopathy. 3. New subcentimeter FDG avid focus localizing to anterior limb right adrenal gland, suspicious for adrenal metastasis. avid focus localizing to the anterior limb right adrenal gland corresponding to a 0.8 cm nodule which is better seen on contrast enhanced CT of September 10, 2019 (SUV 5.2; image 136). \par \par Mr. Soto last saw Dr. Rodgers on 9/24/19 - He last saw Dr. Hannah on 10/2/19 and was deemed to not be a surgical candidate. No complaints of pain noted.\par \par

## 2019-10-14 NOTE — PHYSICAL EXAM
[Normal] : normal skin color and pigmentation and no rash [No Focal Deficits] : no focal deficits [Sclera] : the sclera and conjunctiva were normal

## 2019-10-14 NOTE — LETTER CLOSING
[Consult Closing:] : Thank you for allowing me to participate in the care of this patient.  If you have any questions, please do not hesitate to contact me. [Sincerely yours,] : Sincerely yours, [FreeTextEntry3] : Claudio Blanchard MD\par  and Residency \par Department of Radiation Medicine\par [facility]\par [facility]\par [address] [place] [address]\[place] [address]\par Tel: [phone]\par Fax: [phone]\par \par \par

## 2019-10-14 NOTE — REVIEW OF SYSTEMS
[Constipation] : constipation [SOB on Exertion] : shortness of breath during exertion [Easy Bruising] : a tendency for easy bruising [Negative] : Neurological [Diarrhea: Grade 0] : Diarrhea: Grade 0 [Constipation: Grade 0] : Constipation: Grade 0 [Dysphagia: Grade 0] : Dysphagia: Grade 0 [Nausea: Grade 0] : Nausea: Grade 0 [Vomiting: Grade 0] : Vomiting: Grade 0 [Cough: Grade 0] : Cough: Grade 0 [Fatigue: Grade 0] : Fatigue: Grade 0 [Dyspnea: Grade 1 - Shortness of breath with moderate exertion] : Dyspnea: Grade 1 - Shortness of breath with moderate exertion [FreeTextEntry7] : occ

## 2019-10-14 NOTE — HISTORY OF PRESENT ILLNESS
[FreeTextEntry1] : Mr. Alvarado Soto is a 68 year-old male with T3N3M1 adenocarcinoma of the lung currently receiving carbo, pemetrexed, keytruda under care of Dr. Rodgers with recent imaging showing progression of a left upper lobe lung mass. KRAS+/PDL1 70%\par \par Mr. 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 Around end of September 2018, he developed hoarseness of voice which was thought to be laryngitis- was sent to ENT by domestic partner who is a speech pathologists. He was given medrol pack which did not help and then referred for CT chest which showed left upper lobe mass measuring 6.6 cm as well as hilar/mediastinal adenopathy and b/l nodules consistent with mets. \par \par On 11/2/18, he had an MRI brain without contrast which showed microvascular changes. \par \par On 1/16/19, he underwent a CT chest w/ contrast which showed a 6.0 x 4.5 x 5.5 irregular solid mass in the left apex and a 3.2 cm metastatic deposit in the AP window. \par \par On 1/29/19, he underwent a PET/CT which showed 1. Hypermetabolic left upper lobe lung mass with surrounding groundglass measures 6.4 x 4.3 cm (SUV 24.0; image 73). Hypermetabolism extends to the medial and anterior pleura. 2. Hypermetabolic left perihilar and AP window lymphadenopathy, as seen on recent contrast-enhanced CT. For reference, AP window node measures approximately 3.0 x 2.5 cm (SUV 19.1; image 86). 3. Subcentimeter bilateral pulmonary nodules, unchanged as compared to CT dated 1/16/2019, are compatible with metastatic disease. 4. Left vocal cord paralysis secondary to AP window lymphadenopathy. \par \par On 2/14/19, he underwent a CT guided biopsy of the left upper lobe mass and pathology revealed non-small cell lung adenocarcinoma\par \par On 2/26/19, he was started on Carbo/pemetrexed and Keytruda under care of Dr. Rodgers\par \par On 4/2/19, he underwent a CT chest/abdomen w/ contrast which showed a Left upper lobe mass, consistent with the history of lung carcinoma, slightly decreased in size since 1/29/2019. Left perihilar and aortopulmonary window lymphadenopathy is also slightly decreased in size. Bilateral pulmonary nodules, many of which are unchanged. However, a previously noted reference left lower lobe nodule is slightly decreased in size.\par  \par On 6/5/19, he underwent a nuclear bone scan which showed posttraumatic changes to his right anterior ribs and no evidence of osseous metastases. He also underwent a CT chest/abdomen with contrast which showed the left upper lobe mass has decreased in size, as has left hilar adenopathy. Multiple bilateral subcentimeter pulmonary nodules are unchanged. \par \par On 9/10/19, he underwent a CT chest/abdomen w/ contrast which showed the left upper lobe mass, increased in size since 6/5/2019, now measuring 4.4 x 4.2 cm. Left hilar/aortopulmonary window lymphadenopathy is stable to mildly decreased in size. Subcentimeter left supraclavicular lymph nodes. Bilateral subcentimeter pulmonary nodules, not significantly changed.  Small pericardial effusion. \par \par 7/26/19 -pt started on Alimta and Keytruda and had 4 treatments of Carboplatin. Presently only on Alimpa and Keytruda. no complaints of pain noted. no dysphagia and eating well. occ constipation noted. \par

## 2019-10-15 ENCOUNTER — APPOINTMENT (OUTPATIENT)
Dept: HEMATOLOGY ONCOLOGY | Facility: CLINIC | Age: 69
End: 2019-10-15
Payer: MEDICARE

## 2019-10-15 ENCOUNTER — RESULT REVIEW (OUTPATIENT)
Age: 69
End: 2019-10-15

## 2019-10-15 ENCOUNTER — LABORATORY RESULT (OUTPATIENT)
Age: 69
End: 2019-10-15

## 2019-10-15 ENCOUNTER — APPOINTMENT (OUTPATIENT)
Dept: INFUSION THERAPY | Facility: HOSPITAL | Age: 69
End: 2019-10-15

## 2019-10-15 VITALS
OXYGEN SATURATION: 94 % | RESPIRATION RATE: 16 BRPM | BODY MASS INDEX: 20.85 KG/M2 | WEIGHT: 137.13 LBS | SYSTOLIC BLOOD PRESSURE: 108 MMHG | HEART RATE: 103 BPM | TEMPERATURE: 97.5 F | DIASTOLIC BLOOD PRESSURE: 74 MMHG

## 2019-10-15 DIAGNOSIS — R49.0 DYSPHONIA: ICD-10-CM

## 2019-10-15 DIAGNOSIS — R91.8 OTHER NONSPECIFIC ABNORMAL FINDING OF LUNG FIELD: ICD-10-CM

## 2019-10-15 LAB
BASOPHILS # BLD AUTO: 0.1 K/UL — SIGNIFICANT CHANGE UP (ref 0–0.2)
BASOPHILS NFR BLD AUTO: 0.5 % — SIGNIFICANT CHANGE UP (ref 0–2)
EOSINOPHIL # BLD AUTO: 0.1 K/UL — SIGNIFICANT CHANGE UP (ref 0–0.5)
EOSINOPHIL NFR BLD AUTO: 0.7 % — SIGNIFICANT CHANGE UP (ref 0–6)
HCT VFR BLD CALC: 37.4 % — LOW (ref 39–50)
HGB BLD-MCNC: 11.6 G/DL — LOW (ref 13–17)
LYMPHOCYTES # BLD AUTO: 1.4 K/UL — SIGNIFICANT CHANGE UP (ref 1–3.3)
LYMPHOCYTES # BLD AUTO: 13.8 % — SIGNIFICANT CHANGE UP (ref 13–44)
MCHC RBC-ENTMCNC: 31 G/DL — LOW (ref 32–36)
MCHC RBC-ENTMCNC: 31 PG — SIGNIFICANT CHANGE UP (ref 27–34)
MCV RBC AUTO: 100 FL — SIGNIFICANT CHANGE UP (ref 80–100)
MONOCYTES # BLD AUTO: 1.4 K/UL — HIGH (ref 0–0.9)
MONOCYTES NFR BLD AUTO: 13.6 % — SIGNIFICANT CHANGE UP (ref 2–14)
NEUTROPHILS # BLD AUTO: 7.2 K/UL — SIGNIFICANT CHANGE UP (ref 1.8–7.4)
NEUTROPHILS NFR BLD AUTO: 71.4 % — SIGNIFICANT CHANGE UP (ref 43–77)
PLATELET # BLD AUTO: 535 K/UL — HIGH (ref 150–400)
RBC # BLD: 3.74 M/UL — LOW (ref 4.2–5.8)
RBC # FLD: 13.7 % — SIGNIFICANT CHANGE UP (ref 10.3–14.5)
WBC # BLD: 10 K/UL — SIGNIFICANT CHANGE UP (ref 3.8–10.5)
WBC # FLD AUTO: 10 K/UL — SIGNIFICANT CHANGE UP (ref 3.8–10.5)

## 2019-10-15 PROCEDURE — 99215 OFFICE O/P EST HI 40 MIN: CPT

## 2019-10-16 DIAGNOSIS — Z51.11 ENCOUNTER FOR ANTINEOPLASTIC CHEMOTHERAPY: ICD-10-CM

## 2019-10-16 DIAGNOSIS — R11.2 NAUSEA WITH VOMITING, UNSPECIFIED: ICD-10-CM

## 2019-10-21 ENCOUNTER — INPATIENT (INPATIENT)
Facility: HOSPITAL | Age: 69
LOS: 3 days | Discharge: HOME | DRG: 607 | End: 2019-10-25
Attending: INTERNAL MEDICINE | Admitting: STUDENT IN AN ORGANIZED HEALTH CARE EDUCATION/TRAINING PROGRAM
Payer: MEDICARE

## 2019-10-21 VITALS
WEIGHT: 130.07 LBS | TEMPERATURE: 98 F | OXYGEN SATURATION: 97 % | DIASTOLIC BLOOD PRESSURE: 66 MMHG | HEIGHT: 68 IN | SYSTOLIC BLOOD PRESSURE: 111 MMHG | HEART RATE: 100 BPM | RESPIRATION RATE: 19 BRPM

## 2019-10-21 DIAGNOSIS — Z29.9 ENCOUNTER FOR PROPHYLACTIC MEASURES, UNSPECIFIED: ICD-10-CM

## 2019-10-21 DIAGNOSIS — Z98.890 OTHER SPECIFIED POSTPROCEDURAL STATES: Chronic | ICD-10-CM

## 2019-10-21 DIAGNOSIS — L03.818 CELLULITIS OF OTHER SITES: ICD-10-CM

## 2019-10-21 DIAGNOSIS — L03.90 CELLULITIS, UNSPECIFIED: ICD-10-CM

## 2019-10-21 DIAGNOSIS — E78.5 HYPERLIPIDEMIA, UNSPECIFIED: ICD-10-CM

## 2019-10-21 DIAGNOSIS — C34.92 MALIGNANT NEOPLASM OF UNSPECIFIED PART OF LEFT BRONCHUS OR LUNG: ICD-10-CM

## 2019-10-21 DIAGNOSIS — I10 ESSENTIAL (PRIMARY) HYPERTENSION: ICD-10-CM

## 2019-10-21 DIAGNOSIS — J44.9 CHRONIC OBSTRUCTIVE PULMONARY DISEASE, UNSPECIFIED: ICD-10-CM

## 2019-10-21 LAB
ANION GAP SERPL CALC-SCNC: 13 MMOL/L — SIGNIFICANT CHANGE UP (ref 5–17)
BASOPHILS # BLD AUTO: 0.02 K/UL — SIGNIFICANT CHANGE UP (ref 0–0.2)
BASOPHILS NFR BLD AUTO: 0.4 % — SIGNIFICANT CHANGE UP (ref 0–2)
BUN SERPL-MCNC: 11 MG/DL — SIGNIFICANT CHANGE UP (ref 7–23)
CALCIUM SERPL-MCNC: 8.9 MG/DL — SIGNIFICANT CHANGE UP (ref 8.4–10.5)
CHLORIDE SERPL-SCNC: 98 MMOL/L — SIGNIFICANT CHANGE UP (ref 96–108)
CO2 SERPL-SCNC: 27 MMOL/L — SIGNIFICANT CHANGE UP (ref 22–31)
CREAT SERPL-MCNC: 0.83 MG/DL — SIGNIFICANT CHANGE UP (ref 0.5–1.3)
CRP SERPL-MCNC: 10.21 MG/DL — HIGH (ref 0–0.4)
EOSINOPHIL # BLD AUTO: 0.05 K/UL — SIGNIFICANT CHANGE UP (ref 0–0.5)
EOSINOPHIL NFR BLD AUTO: 1 % — SIGNIFICANT CHANGE UP (ref 0–6)
ERYTHROCYTE [SEDIMENTATION RATE] IN BLOOD: 102 MM/HR — HIGH (ref 0–20)
GLUCOSE SERPL-MCNC: 113 MG/DL — HIGH (ref 70–99)
HCT VFR BLD CALC: 32.8 % — LOW (ref 39–50)
HGB BLD-MCNC: 10.4 G/DL — LOW (ref 13–17)
IMM GRANULOCYTES NFR BLD AUTO: 0.2 % — SIGNIFICANT CHANGE UP (ref 0–1.5)
LYMPHOCYTES # BLD AUTO: 0.66 K/UL — LOW (ref 1–3.3)
LYMPHOCYTES # BLD AUTO: 13 % — SIGNIFICANT CHANGE UP (ref 13–44)
MCHC RBC-ENTMCNC: 30.8 PG — SIGNIFICANT CHANGE UP (ref 27–34)
MCHC RBC-ENTMCNC: 31.7 GM/DL — LOW (ref 32–36)
MCV RBC AUTO: 97 FL — SIGNIFICANT CHANGE UP (ref 80–100)
MONOCYTES # BLD AUTO: 0.26 K/UL — SIGNIFICANT CHANGE UP (ref 0–0.9)
MONOCYTES NFR BLD AUTO: 5.1 % — SIGNIFICANT CHANGE UP (ref 2–14)
NEUTROPHILS # BLD AUTO: 4.06 K/UL — SIGNIFICANT CHANGE UP (ref 1.8–7.4)
NEUTROPHILS NFR BLD AUTO: 80.3 % — HIGH (ref 43–77)
NRBC # BLD: 0 /100 WBCS — SIGNIFICANT CHANGE UP (ref 0–0)
PLATELET # BLD AUTO: 353 K/UL — SIGNIFICANT CHANGE UP (ref 150–400)
POTASSIUM SERPL-MCNC: 4.3 MMOL/L — SIGNIFICANT CHANGE UP (ref 3.5–5.3)
POTASSIUM SERPL-SCNC: 4.3 MMOL/L — SIGNIFICANT CHANGE UP (ref 3.5–5.3)
RBC # BLD: 3.38 M/UL — LOW (ref 4.2–5.8)
RBC # FLD: 13.5 % — SIGNIFICANT CHANGE UP (ref 10.3–14.5)
SODIUM SERPL-SCNC: 138 MMOL/L — SIGNIFICANT CHANGE UP (ref 135–145)
WBC # BLD: 5.06 K/UL — SIGNIFICANT CHANGE UP (ref 3.8–10.5)
WBC # FLD AUTO: 5.06 K/UL — SIGNIFICANT CHANGE UP (ref 3.8–10.5)

## 2019-10-21 PROCEDURE — 99285 EMERGENCY DEPT VISIT HI MDM: CPT | Mod: GC

## 2019-10-21 PROCEDURE — 99223 1ST HOSP IP/OBS HIGH 75: CPT | Mod: GC

## 2019-10-21 RX ORDER — VANCOMYCIN HCL 1 G
1000 VIAL (EA) INTRAVENOUS ONCE
Refills: 0 | Status: COMPLETED | OUTPATIENT
Start: 2019-10-21 | End: 2019-10-21

## 2019-10-21 RX ORDER — FOLIC ACID 0.8 MG
1 TABLET ORAL DAILY
Refills: 0 | Status: DISCONTINUED | OUTPATIENT
Start: 2019-10-21 | End: 2019-10-25

## 2019-10-21 RX ORDER — LACTOBACILLUS ACIDOPHILUS 100MM CELL
1 CAPSULE ORAL ONCE
Refills: 0 | Status: COMPLETED | OUTPATIENT
Start: 2019-10-21 | End: 2019-10-21

## 2019-10-21 RX ORDER — ALPRAZOLAM 0.25 MG
0.5 TABLET ORAL AT BEDTIME
Refills: 0 | Status: DISCONTINUED | OUTPATIENT
Start: 2019-10-21 | End: 2019-10-25

## 2019-10-21 RX ORDER — SENNA PLUS 8.6 MG/1
2 TABLET ORAL AT BEDTIME
Refills: 0 | Status: DISCONTINUED | OUTPATIENT
Start: 2019-10-21 | End: 2019-10-25

## 2019-10-21 RX ORDER — METOPROLOL TARTRATE 50 MG
1 TABLET ORAL
Qty: 0 | Refills: 0 | DISCHARGE

## 2019-10-21 RX ORDER — VANCOMYCIN HCL 1 G
1000 VIAL (EA) INTRAVENOUS EVERY 12 HOURS
Refills: 0 | Status: DISCONTINUED | OUTPATIENT
Start: 2019-10-21 | End: 2019-10-24

## 2019-10-21 RX ORDER — METOPROLOL TARTRATE 50 MG
100 TABLET ORAL
Refills: 0 | Status: DISCONTINUED | OUTPATIENT
Start: 2019-10-21 | End: 2019-10-25

## 2019-10-21 RX ORDER — AMLODIPINE BESYLATE 2.5 MG/1
10 TABLET ORAL DAILY
Refills: 0 | Status: DISCONTINUED | OUTPATIENT
Start: 2019-10-21 | End: 2019-10-22

## 2019-10-21 RX ORDER — ALBUTEROL 90 UG/1
1 AEROSOL, METERED ORAL
Refills: 0 | Status: DISCONTINUED | OUTPATIENT
Start: 2019-10-21 | End: 2019-10-25

## 2019-10-21 RX ORDER — UMECLIDINIUM BROMIDE AND VILANTEROL TRIFENATATE 62.5; 25 UG/1; UG/1
1 POWDER RESPIRATORY (INHALATION) DAILY
Refills: 0 | Status: DISCONTINUED | OUTPATIENT
Start: 2019-10-21 | End: 2019-10-25

## 2019-10-21 RX ORDER — LACTOBACILLUS ACIDOPHILUS 100MM CELL
1 CAPSULE ORAL DAILY
Refills: 0 | Status: DISCONTINUED | OUTPATIENT
Start: 2019-10-21 | End: 2019-10-22

## 2019-10-21 RX ORDER — CEFTRIAXONE 500 MG/1
1000 INJECTION, POWDER, FOR SOLUTION INTRAMUSCULAR; INTRAVENOUS ONCE
Refills: 0 | Status: COMPLETED | OUTPATIENT
Start: 2019-10-21 | End: 2019-10-21

## 2019-10-21 RX ORDER — LANOLIN ALCOHOL/MO/W.PET/CERES
10 CREAM (GRAM) TOPICAL AT BEDTIME
Refills: 0 | Status: DISCONTINUED | OUTPATIENT
Start: 2019-10-21 | End: 2019-10-25

## 2019-10-21 RX ORDER — ENOXAPARIN SODIUM 100 MG/ML
40 INJECTION SUBCUTANEOUS DAILY
Refills: 0 | Status: DISCONTINUED | OUTPATIENT
Start: 2019-10-21 | End: 2019-10-25

## 2019-10-21 RX ORDER — ATORVASTATIN CALCIUM 80 MG/1
20 TABLET, FILM COATED ORAL AT BEDTIME
Refills: 0 | Status: DISCONTINUED | OUTPATIENT
Start: 2019-10-21 | End: 2019-10-25

## 2019-10-21 RX ORDER — QUINAPRIL HYDROCHLORIDE 40 MG/1
40 TABLET, FILM COATED ORAL DAILY
Refills: 0 | Status: DISCONTINUED | OUTPATIENT
Start: 2019-10-21 | End: 2019-10-23

## 2019-10-21 RX ORDER — LISINOPRIL 2.5 MG/1
40 TABLET ORAL DAILY
Refills: 0 | Status: DISCONTINUED | OUTPATIENT
Start: 2019-10-21 | End: 2019-10-21

## 2019-10-21 RX ADMIN — Medication 1 TABLET(S): at 18:21

## 2019-10-21 RX ADMIN — Medication 1 TABLET(S): at 22:56

## 2019-10-21 RX ADMIN — Medication 250 MILLIGRAM(S): at 18:22

## 2019-10-21 RX ADMIN — AMLODIPINE BESYLATE 10 MILLIGRAM(S): 2.5 TABLET ORAL at 22:57

## 2019-10-21 RX ADMIN — CEFTRIAXONE 100 MILLIGRAM(S): 500 INJECTION, POWDER, FOR SOLUTION INTRAMUSCULAR; INTRAVENOUS at 23:02

## 2019-10-21 RX ADMIN — Medication 100 MILLIGRAM(S): at 22:56

## 2019-10-21 NOTE — ED ADULT NURSE REASSESSMENT NOTE - NS ED NURSE REASSESS COMMENT FT1
pt received bed assignment on 5monti, pt informed of bed assignment, report given to Xiomara, vital signs stable, awaiting transport.

## 2019-10-21 NOTE — H&P ADULT - PROBLEM SELECTOR PLAN 4
- continue with albuterol - continue with albuterol  - patient is on Anoro Ellipta at home as well - continue with albuterol  - patient is on Anoro Ellipta at home as well, will let him know to bring it in

## 2019-10-21 NOTE — ED PROVIDER NOTE - CLINICAL SUMMARY MEDICAL DECISION MAKING FREE TEXT BOX
IRENE Rowan MD: Pt is a 69 y/o male with PMH COPD HTN HLD lung CA on chemo who p/w failed outpatient cellulitis. Patient has had several weeks of erythema to R foot, initially treated for cellulitis at the end of August with augmentin, improved. A few weeks later it returned and he was tx w/ cephalexin, which caused him severe abdominal pain reaction (similar to prior reaction to cephalexin) so pt stopped keflex and has been on augmentin x 2 days w/o improvement. Sent in by Oncologist for admission. Pt ambulatory. Admits to some SOB and cough at baseline, not worse. No f/c. Pt denies: chest pain, fevers, chills, pleuritic chest pain, abdominal pain, n/v/d, back pain, neck pain, HA, neck stiffness, focal numbness or weakness, visual changes, dizziness, lightheadedness, recent travel, recent trauma, recent immobilization, dysuria, hematuria. Given that patient is immunocompromised, on ctx, and has failed outpt abx tx, will start IV abx, admit for further eval and mgt.

## 2019-10-21 NOTE — H&P ADULT - NSHPPHYSICALEXAM_GEN_ALL_CORE
Vital Signs Last 24 Hrs  T(C): 37.3 (21 Oct 2019 20:08), Max: 37.3 (21 Oct 2019 20:08)  T(F): 99.2 (21 Oct 2019 20:08), Max: 99.2 (21 Oct 2019 20:08)  HR: 98 (21 Oct 2019 20:08) (82 - 100)  BP: 99/75 (21 Oct 2019 20:08) (99/75 - 111/66)  BP(mean): --  RR: 20 (21 Oct 2019 20:08) (19 - 20)  SpO2: 97% (21 Oct 2019 20:08) (97% - 98%)    General: Not in acute distress  Head: Normocephalic, Atraumatic  Eyes: PERRLA, EOMI, normal sclera  Throat: Moist mucous membranes  Respiratory: CTAB, normal respiratory effort, no wheezes, crackles, rales  CV: RRR, S1/S2, no murmurs, rubs or gallops  Abdominal: Soft, bowel sounds present, NT, ND +BS  Extremities: Pitting edema, faint peripheral pulses  Neurological: A&Ox4, MAEx4, non-focal  Skin: Blanching erythema of dorsum of right foot extending up to ankle with elevated temperature, erythema of dorsum of left foot Vital Signs Last 24 Hrs  T(C): 37.3 (21 Oct 2019 20:08), Max: 37.3 (21 Oct 2019 20:08)  T(F): 99.2 (21 Oct 2019 20:08), Max: 99.2 (21 Oct 2019 20:08)  HR: 98 (21 Oct 2019 20:08) (82 - 100)  BP: 99/75 (21 Oct 2019 20:08) (99/75 - 111/66)  BP(mean): --  RR: 20 (21 Oct 2019 20:08) (19 - 20)  SpO2: 97% (21 Oct 2019 20:08) (97% - 98%)    General: Not in acute distress  Head: Normocephalic, Atraumatic  Eyes: PERRLA, EOMI, normal sclera  Throat: Moist mucous membranes  Respiratory: CTAB, normal respiratory effort, no wheezes, crackles, rales  CV: RRR, S1/S2, no murmurs, rubs or gallops  Abdominal: Soft, bowel sounds present, NT, ND +BS  Extremities: Pitting edema, faint peripheral pulses  Neurological: A&Ox4, MAEx4, non-focal  Skin: Demarcated, blanching erythema of dorsum of right foot extending up to ankle with elevated temperature, no erythema between the toes, erythema of dorsum of left foot Vital Signs Last 24 Hrs  T(C): 37.3 (21 Oct 2019 20:08), Max: 37.3 (21 Oct 2019 20:08)  T(F): 99.2 (21 Oct 2019 20:08), Max: 99.2 (21 Oct 2019 20:08)  HR: 98 (21 Oct 2019 20:08) (82 - 100)  BP: 99/75 (21 Oct 2019 20:08) (99/75 - 111/66)  BP(mean): --  RR: 20 (21 Oct 2019 20:08) (19 - 20)  SpO2: 97% (21 Oct 2019 20:08) (97% - 98%)    General: Not in acute distress  Head: Normocephalic, Atraumatic  Eyes: PERRLA, EOMI, normal sclera  Throat: Moist mucous membranes  Respiratory: CTAB, normal respiratory effort, no wheezes, crackles, rales  CV: RRR, S1/S2, no murmurs, rubs or gallops  Abdominal: Soft, bowel sounds present, NT, ND +BS  Extremities: Pitting edema, faint peripheral pulses  Neurological: A&Ox4, MAEx4, non-focal  Skin: Demarcated, blanching erythema of dorsum of right foot extending up to ankle with elevated temperature, no erythema between the toes, no crusting of the skin, erythema of dorsum of left foot

## 2019-10-21 NOTE — H&P ADULT - ASSESSMENT
68 year old male with COPD HTN, HLD, adenocarcinoma of the lung (on Keytruda and Olympta) at [facility] presents after failed outpatient treatment of cellulitis of the right foot, admitted for IV antibiotic treatment.

## 2019-10-21 NOTE — H&P ADULT - PROBLEM SELECTOR PLAN 3
- continue metoprolol succinate 100 mg BID, amlodipine 10 mg daily and lisinopril 40 mg daily (therapeutically exchanges for quinapril) - continue metoprolol succinate 100 mg BID, amlodipine 10 mg daily and lisinopril 40 mg daily (therapeutically exchanges for quinapril). - continue metoprolol succinate 100 mg BID, amlodipine 10 mg daily and lisinopril 40 mg daily (therapeutically exchanges for quinapril).  - Patient not interested in discontinuing amlodipine despite it likely cause pedal edema - continue metoprolol succinate 100 mg BID, amlodipine 10 mg daily and quinapril 40 mg daily (from home)  - Patient not interested in discontinuing amlodipine despite its likely cause of pedal edema

## 2019-10-21 NOTE — H&P ADULT - PROBLEM SELECTOR PLAN 2
The patient was diagnosed approximately one year ago. Oncologist is Dr. Milka Rodgers at [facility]. Patient was originally treated with four rounds of Keytruda, Olympta, and carboplatin. The carboplatin was stopped and he continues to be treated with the Keytruda and Olympta. He will be starting radiation therapy soon. He will also be partaking in a drug trial at [facility]. His current chemotherapy regimen causes constipation for which he takes senna and colace.  - Oncology consult The patient was diagnosed approximately one year ago. Oncologist is Dr. Milka Rodgers at [facility]. Patient was originally treated with four rounds of Keytruda, Olympta, and carboplatin. The carboplatin was stopped and he continues to be treated with the Keytruda and Olympta. He will be starting radiation therapy soon. He will also be partaking in a drug trial at [facility]. His current chemotherapy regimen causes constipation for which he takes senna and colace.

## 2019-10-21 NOTE — ED ADULT NURSE REASSESSMENT NOTE - NS ED NURSE REASSESS COMMENT FT1
report received from day nurse robin, pt resting comfortable in bed, pt A&Ox3, pt has redness to right foot, right piv wdl, abx in progress. second set of cultures drawn. will cont to monitor,

## 2019-10-21 NOTE — H&P ADULT - NSHPLABSRESULTS_GEN_ALL_CORE
LABS:                          10.4   5.06  )-----------( 353      ( 21 Oct 2019 17:54 )             32.8     Hb Trend: 10.4<--, 11.6<--  WBC Trend: 5.06<--, 10.0<--  Plt Trend: 353<--, 535<--          10-21    138  |  98  |  11  ----------------------------<  113<H>  4.3   |  27  |  0.83    Ca    8.9      21 Oct 2019 17:54            CAPILLARY BLOOD GLUCOSE

## 2019-10-21 NOTE — ED ADULT NURSE NOTE - OBJECTIVE STATEMENT
Patient   is  alert   and  oriented x3.   Color  is  good  and  skin warm  to touch.   Swelling  and  redness  noted   to  rt  foot  and left   foot  also.  Rt  Foot  is  more  red than  the  left  one.  Patient   has  been  afebrile.

## 2019-10-21 NOTE — H&P ADULT - NSHPREVIEWOFSYSTEMS_GEN_ALL_CORE
REVIEW OF SYSTEMS:  CONSTITUTIONAL: No fever, chills, night sweats, or fatigue  EYES: No eye pain, visual disturbances, or discharge  ENT:  No difficulty hearing, tinnitus, vertigo  NECK: No pain or stiffness  RESPIRATORY: No cough, wheezing, or hemoptysis  CARDIOVASCULAR: No chest pain, palpitations, dizziness, or leg swelling  GASTROINTESTINAL: No abdominal or epigastric pain.   GENITOURINARY: No dysuria, frequency, hematuria, or incontinence  NEUROLOGICAL: No headaches, memory loss, loss of strength, numbness, or tremors  SKIN: SEE HPI   LYMPH NODES: No enlarged glands  ENDOCRINE: No hot or cold intolerance; No hair loss  MUSCULOSKELETAL: No joint pain or swelling; No muscle, back, or extremity pain  PSYCHIATRIC: No depression, anxiety, mood swings, or difficulty sleeping  HEME/LYMPH: No easy bruising, or bleeding gums  ALLERGY AND IMMUNOLOGIC: No hives or eczema CONSTITUTIONAL: No fever, chills, night sweats, or fatigue  EYES: No eye pain, visual disturbances, or discharge  ENT:  No difficulty hearing, tinnitus, vertigo  NECK: No pain or stiffness  RESPIRATORY: No cough, wheezing, or hemoptysis  CARDIOVASCULAR: No chest pain, palpitations, dizziness, or leg swelling  GASTROINTESTINAL: No abdominal or epigastric pain.   GENITOURINARY: No dysuria, frequency, hematuria, or incontinence  NEUROLOGICAL: No headaches, memory loss, loss of strength, numbness, or tremors  SKIN: SEE HPI   LYMPH NODES: No enlarged glands  ENDOCRINE: No hot or cold intolerance; No hair loss  MUSCULOSKELETAL: No joint pain or swelling; No muscle, back, or extremity pain  PSYCHIATRIC: No depression, anxiety, mood swings, or difficulty sleeping  HEME/LYMPH: No easy bruising, or bleeding gums  ALLERGY AND IMMUNOLOGIC: No hives or eczema

## 2019-10-21 NOTE — H&P ADULT - HISTORY OF PRESENT ILLNESS
68 year old male with COPD HTN, HLD, adenocarcinoma of the lung (on Keytruda and Olympta) at [facility] presents after weeks of erythematous and slightly painful right dorsum of foot. Two months ago the patient broke his 4th toe and metatarsal on the right and developed cellulits. The patient reports that his oncologist thought that the combination of a possible break in the dry skin of his foot combined with his lower extremity swelling secondary to amlodipine may have caused this. His podiatrist prescribed Augmentin 500 mg BI for 7 days. He completed the course and saw an improvement in his foot, but th erythema soon returned. His Oncologist soon prescribed Kephlex 500 mg TID. The patient took two doses and stopped the medication because it causes severe abdominal pain. This reaction is one he had many years ago with this medication. Last Saturday, the patient noticed that the erythema have moved up his ankle and erythema started to appear on the dorsum of the left foot. He called his Oncologist who prescribed Augmentin 500 mg TID. They tried the medication until Monday and noticed no improvement. His oncologist instructed him to go to the ED for IV antibiotics. His temperature is usually 97.5F, but last night his temperature elevated to 98.3F. He took two Tylenol tablets and this morning his temperature returned to normal. He denies chills, sick contacts, chest pain, cough, shortness of breath, nausea, vomiting or diarrhea.    Of note, the patient was originally treated with four rounds of Keytruda, Olympta, and carboplatin. The carboplatin was stopped and he continues to be treated with the Keytruda and Olympta. He will be starting radiation therapy soon. He will also be partaking in a drug trial at [facility]. His current chemotherapy regimen causes constipation for which he takes senna and colace.    In the ED, vital signs were Tmax 98.7F, HR  bpm, -111/66-78 mm Hg, RR 19-20, 97-98% RA.  He received 1g vancomycin X1, lactobacillus acidophilus. 68 year old male with COPD HTN, HLD, adenocarcinoma of the lung (on Keytruda and Olympta) at [facility] presents after failed outpatient treatment of cellulitis of the right foot. Two months ago the patient broke his 4th toe and metatarsal on the right and developed cellulitis The patient reports that his oncologist thought that the combination of a possible break in the dry skin of his foot combined with his lower extremity swelling secondary to amlodipine may have caused this. His podiatrist prescribed Augmentin 500 mg BI for 7 days. He completed the course and saw an improvement in his foot, but th erythema soon returned. His Oncologist soon prescribed Kephlex 500 mg TID. The patient took two doses and stopped the medication because it causes severe abdominal pain. This reaction is one he had many years ago with this medication. Last Saturday, the patient noticed that the erythema have moved up his ankle and erythema started to appear on the dorsum of the left foot. He called his Oncologist who prescribed Augmentin 500 mg TID. They tried the medication until Monday and noticed no improvement. His oncologist instructed him to go to the ED for IV antibiotics. His temperature is usually 97.5F, but last night his temperature elevated to 98.3F. He took two Tylenol tablets and this morning his temperature returned to normal. He denies chills, sick contacts, chest pain, cough, shortness of breath, nausea, vomiting or diarrhea.    Of note, the patient was diagnosed approximately one year ago. The patient was originally treated with four rounds of Keytruda, Olympta, and carboplatin. The carboplatin was stopped and he continues to be treated with the Keytruda and Olympta. He will be starting radiation therapy soon. He will also be partaking in a drug trial at [facility]. His current chemotherapy regimen causes constipation for which he takes senna and colace.    In the ED, vital signs were Tmax 98.7F, HR  bpm, -111/66-78 mm Hg, RR 19-20, 97-98% RA.  He received 1g vancomycin X1, lactobacillus acidophilus. 68 year old male with COPD HTN, HLD, adenocarcinoma of the lung (on Keytruda and Olympta) at [facility] presents after failed outpatient treatment of cellulitis of the right foot. Two months ago the patient broke his 4th toe and metatarsal on the right and developed cellulitis The patient reports that his oncologist thought that the combination of a possible break in the dry skin of his foot combined with his lower extremity swelling secondary to amlodipine may have caused this. His podiatrist prescribed Augmentin 500 mg BID for 7 days. He completed the course and saw an improvement in his foot, but th erythema soon returned. His Oncologist soon prescribed Kephlex 500 mg TID. The patient took two doses and stopped the medication because it caused severe abdominal pain. This reaction is one he had many years ago with this medication. Last Saturday, the patient noticed that the erythema have moved up his ankle and erythema started to appear on the dorsum of the left foot. He called his Oncologist who prescribed Augmentin 500 mg TID. He tried the medication until Monday and noticed no improvement. His oncologist instructed him to go to the ED for IV antibiotics. His temperature is usually 97.5F, but last night his temperature elevated to 98.3F. He took two Tylenol tablets and this morning his temperature returned to normal. He denies chills, sick contacts, chest pain, cough, shortness of breath, nausea, vomiting or diarrhea.    Of note, the patient was diagnosed approximately one year ago with lung cancer. The patient was originally treated with four rounds of Keytruda, Olympta, and carboplatin. The carboplatin was stopped and he continues to be treated with the Keytruda and Olympta. He will be starting radiation therapy soon. He will also be partaking in a drug trial at [facility]. His current chemotherapy regimen causes constipation for which he takes senna and colace.    In the ED, vital signs were Tmax 98.7F, HR  bpm, -111/66-78 mm Hg, RR 19-20, 97-98% RA.  He received 1g vancomycin X1, lactobacillus acidophilus.

## 2019-10-21 NOTE — ED ADULT NURSE NOTE - PMH
Adenocarcinoma of lung    Anxiety    Arthritis    COPD (chronic obstructive pulmonary disease)    HLD (hyperlipidemia)    Hoarseness of voice    HTN (hypertension)    Inguinal hernia    Irritable bowel syndrome    Lymphadenopathy    Metastatic disease    Nerve damage    Occupational exposure to toxic agent    Spondylolisthesis

## 2019-10-21 NOTE — ED PROVIDER NOTE - MUSCULOSKELETAL, MLM
Spine appears normal, range of motion is not limited, no muscle or joint tenderness Moving all 4 extremities equally. DP pulses intact b/l,

## 2019-10-21 NOTE — H&P ADULT - NSICDXPASTMEDICALHX_GEN_ALL_CORE_FT
PAST MEDICAL HISTORY:  Adenocarcinoma of lung     COPD (chronic obstructive pulmonary disease)     HLD (hyperlipidemia)     HTN (hypertension)     Inguinal hernia     Irritable bowel syndrome     Lymphadenopathy     Metastatic disease     Nerve damage posterior cervical region of neck    Occupational exposure to toxic agent benzene    Spondylolisthesis

## 2019-10-21 NOTE — H&P ADULT - ATTENDING COMMENTS
Patient seen and examined at bedside with resident. Below are my findings and assessment:     Agree with above. I will provide patient with a trial of IV antibiotics covering MRSA and beta hemolytic strep given PO treatment failures. He is hemodynamically stable with no systemic symptoms. He denies pain with ambulation or pruritus. On my exam patient with 2+ DP, well demarcated erythema on dorsum of foot bilaterally, slight tenderness to palpation. Pain is not out of proportion to exam. Patient with skin breakdown in interdigital area of feet bilaterally, maybe some scaling. Will treat empirically for tinea pedis with Clotrimazole BID for approximately 1-2 weeks. Keep feet dry otherwise or use water based moisturizer.     Patient with normocytic anemia, suspect chronic disease. Continue to monitor with no active intervention. Rest of plan as documented by resident. Patient seen and examined at bedside with resident. Below are my findings and assessment:     Agree with above. I will provide patient with a trial of IV antibiotics covering MRSA and beta hemolytic strep given PO treatment failures. He is hemodynamically stable with no systemic symptoms. He denies pain with ambulation or pruritus. On my exam patient with 2+ DP, well demarcated erythema on dorsum of foot bilaterally, slight tenderness to palpation. Pain is not out of proportion to exam. Patient with skin breakdown in interdigital area of feet bilaterally, maybe some scaling. Will treat empirically for tinea pedis with Clotrimazole BID for approximately 1-2 weeks. Keep feet dry otherwise or use water based moisturizer.     Patient with normocytic anemia, suspect chronic disease. Continue to monitor with no active intervention. Rest of plan as documented by resident. Labs personally reviewed. Consider imaging if does not clinically improve.

## 2019-10-21 NOTE — H&P ADULT - PROBLEM SELECTOR PLAN 1
Patient presents with two month history of cellulitis that has failed outpatient therapy. Erythema is spreading up to ankle and erythema has appeared on opposite foot. s/p 1g vancomycin x1.  - continue with Vancomycin  - begin Ceftriaxone  - monitor vitals and trend CBC

## 2019-10-21 NOTE — ED PROVIDER NOTE - OBJECTIVE STATEMENT
67 yo M hx COPD HTN HLD lung CA on chemo at [facility] p/w weeks of erythematous and slightly painful R dorsal foot. was treated for cellulitis in end of August. 67 yo M hx COPD HTN HLD lung CA on chemo at [facility] p/w weeks of erythematous and slightly painful R dorsal foot. was treated for cellulitis in end of August with augmentin it improve dthen came back a few weeks later and was tx w/ cephalexin 1wk ago had abdominal pain similar to prior reactin to cephalexin so stopped that and has jann on augmentin for 2 days w/o change in the redness. Denies associted cp/sob, f/c, n/v, drainage from the area.

## 2019-10-21 NOTE — ED ADULT TRIAGE NOTE - CHIEF COMPLAINT QUOTE
R toe redness, completed augmentin in august, started on keflex - unable to complete course due to side effects, now started on augmentin, +slight improvement in redness, +hx lung cancer - on chemo/immunotherapy, last chemo x 6 days ago, +chronic leg edema

## 2019-10-22 LAB
ANION GAP SERPL CALC-SCNC: 12 MMOL/L — SIGNIFICANT CHANGE UP (ref 5–17)
BASOPHILS # BLD AUTO: 0.02 K/UL — SIGNIFICANT CHANGE UP (ref 0–0.2)
BASOPHILS NFR BLD AUTO: 0.5 % — SIGNIFICANT CHANGE UP (ref 0–2)
BUN SERPL-MCNC: 12 MG/DL — SIGNIFICANT CHANGE UP (ref 7–23)
CALCIUM SERPL-MCNC: 8.7 MG/DL — SIGNIFICANT CHANGE UP (ref 8.4–10.5)
CHLORIDE SERPL-SCNC: 96 MMOL/L — SIGNIFICANT CHANGE UP (ref 96–108)
CO2 SERPL-SCNC: 26 MMOL/L — SIGNIFICANT CHANGE UP (ref 22–31)
CREAT SERPL-MCNC: 0.88 MG/DL — SIGNIFICANT CHANGE UP (ref 0.5–1.3)
EOSINOPHIL # BLD AUTO: 0.09 K/UL — SIGNIFICANT CHANGE UP (ref 0–0.5)
EOSINOPHIL NFR BLD AUTO: 2.3 % — SIGNIFICANT CHANGE UP (ref 0–6)
GLUCOSE SERPL-MCNC: 107 MG/DL — HIGH (ref 70–99)
HCT VFR BLD CALC: 28.6 % — LOW (ref 39–50)
HCV AB S/CO SERPL IA: 0.23 S/CO — SIGNIFICANT CHANGE UP (ref 0–0.99)
HCV AB SERPL-IMP: SIGNIFICANT CHANGE UP
HGB BLD-MCNC: 9.3 G/DL — LOW (ref 13–17)
IMM GRANULOCYTES NFR BLD AUTO: 0.5 % — SIGNIFICANT CHANGE UP (ref 0–1.5)
LYMPHOCYTES # BLD AUTO: 1.26 K/UL — SIGNIFICANT CHANGE UP (ref 1–3.3)
LYMPHOCYTES # BLD AUTO: 32.8 % — SIGNIFICANT CHANGE UP (ref 13–44)
MAGNESIUM SERPL-MCNC: 1.6 MG/DL — SIGNIFICANT CHANGE UP (ref 1.6–2.6)
MCHC RBC-ENTMCNC: 30.8 PG — SIGNIFICANT CHANGE UP (ref 27–34)
MCHC RBC-ENTMCNC: 32.5 GM/DL — SIGNIFICANT CHANGE UP (ref 32–36)
MCV RBC AUTO: 94.7 FL — SIGNIFICANT CHANGE UP (ref 80–100)
MONOCYTES # BLD AUTO: 0.45 K/UL — SIGNIFICANT CHANGE UP (ref 0–0.9)
MONOCYTES NFR BLD AUTO: 11.7 % — SIGNIFICANT CHANGE UP (ref 2–14)
NEUTROPHILS # BLD AUTO: 2 K/UL — SIGNIFICANT CHANGE UP (ref 1.8–7.4)
NEUTROPHILS NFR BLD AUTO: 52.2 % — SIGNIFICANT CHANGE UP (ref 43–77)
NRBC # BLD: 0 /100 WBCS — SIGNIFICANT CHANGE UP (ref 0–0)
PHOSPHATE SERPL-MCNC: 2.4 MG/DL — LOW (ref 2.5–4.5)
PLATELET # BLD AUTO: 281 K/UL — SIGNIFICANT CHANGE UP (ref 150–400)
POTASSIUM SERPL-MCNC: 3.9 MMOL/L — SIGNIFICANT CHANGE UP (ref 3.5–5.3)
POTASSIUM SERPL-SCNC: 3.9 MMOL/L — SIGNIFICANT CHANGE UP (ref 3.5–5.3)
RBC # BLD: 3.02 M/UL — LOW (ref 4.2–5.8)
RBC # FLD: 13.3 % — SIGNIFICANT CHANGE UP (ref 10.3–14.5)
SODIUM SERPL-SCNC: 134 MMOL/L — LOW (ref 135–145)
WBC # BLD: 3.84 K/UL — SIGNIFICANT CHANGE UP (ref 3.8–10.5)
WBC # FLD AUTO: 3.84 K/UL — SIGNIFICANT CHANGE UP (ref 3.8–10.5)

## 2019-10-22 PROCEDURE — 93970 EXTREMITY STUDY: CPT | Mod: 26

## 2019-10-22 PROCEDURE — 99233 SBSQ HOSP IP/OBS HIGH 50: CPT | Mod: GC

## 2019-10-22 PROCEDURE — 93010 ELECTROCARDIOGRAM REPORT: CPT | Mod: 76

## 2019-10-22 RX ORDER — SACCHAROMYCES BOULARDII 250 MG
250 POWDER IN PACKET (EA) ORAL
Refills: 0 | Status: DISCONTINUED | OUTPATIENT
Start: 2019-10-22 | End: 2019-10-25

## 2019-10-22 RX ORDER — CEFTRIAXONE 500 MG/1
1000 INJECTION, POWDER, FOR SOLUTION INTRAMUSCULAR; INTRAVENOUS EVERY 24 HOURS
Refills: 0 | Status: DISCONTINUED | OUTPATIENT
Start: 2019-10-22 | End: 2019-10-25

## 2019-10-22 RX ORDER — AMLODIPINE BESYLATE 2.5 MG/1
10 TABLET ORAL DAILY
Refills: 0 | Status: DISCONTINUED | OUTPATIENT
Start: 2019-10-22 | End: 2019-10-23

## 2019-10-22 RX ADMIN — SENNA PLUS 2 TABLET(S): 8.6 TABLET ORAL at 21:32

## 2019-10-22 RX ADMIN — Medication 100 MILLIGRAM(S): at 12:09

## 2019-10-22 RX ADMIN — Medication 1 MILLIGRAM(S): at 10:30

## 2019-10-22 RX ADMIN — Medication 100 MILLIGRAM(S): at 21:33

## 2019-10-22 RX ADMIN — Medication 0.5 MILLIGRAM(S): at 21:32

## 2019-10-22 RX ADMIN — ENOXAPARIN SODIUM 40 MILLIGRAM(S): 100 INJECTION SUBCUTANEOUS at 10:32

## 2019-10-22 RX ADMIN — Medication 1 APPLICATION(S): at 17:33

## 2019-10-22 RX ADMIN — Medication 10 MILLIGRAM(S): at 00:12

## 2019-10-22 RX ADMIN — Medication 0.5 MILLIGRAM(S): at 00:12

## 2019-10-22 RX ADMIN — Medication 250 MILLIGRAM(S): at 06:00

## 2019-10-22 RX ADMIN — Medication 250 MILLIGRAM(S): at 17:33

## 2019-10-22 RX ADMIN — ATORVASTATIN CALCIUM 20 MILLIGRAM(S): 80 TABLET, FILM COATED ORAL at 06:02

## 2019-10-22 RX ADMIN — Medication 10 MILLIGRAM(S): at 21:32

## 2019-10-22 RX ADMIN — UMECLIDINIUM BROMIDE AND VILANTEROL TRIFENATATE 1 PUFF(S): 62.5; 25 POWDER RESPIRATORY (INHALATION) at 10:29

## 2019-10-22 RX ADMIN — Medication 1 APPLICATION(S): at 06:02

## 2019-10-22 RX ADMIN — AMLODIPINE BESYLATE 10 MILLIGRAM(S): 2.5 TABLET ORAL at 21:32

## 2019-10-22 RX ADMIN — CEFTRIAXONE 100 MILLIGRAM(S): 500 INJECTION, POWDER, FOR SOLUTION INTRAMUSCULAR; INTRAVENOUS at 23:35

## 2019-10-22 NOTE — PROGRESS NOTE ADULT - PROBLEM SELECTOR PLAN 4
- continue with albuterol  - patient is on Anoro Ellipta at home as well, will let him know to bring it in

## 2019-10-22 NOTE — PROGRESS NOTE ADULT - ASSESSMENT
68 year old male with COPD HTN, HLD, adenocarcinoma of the lung (on Keytruda and Olympta) at [facility] presents after failed outpatient treatment of cellulitis of the right foot, admitted for IV antibiotic treatment.

## 2019-10-22 NOTE — PROGRESS NOTE ADULT - PROBLEM SELECTOR PLAN 3
- continue metoprolol succinate 100 mg BID, amlodipine 10 mg daily and quinapril 40 mg daily (from home)  - Patient not interested in discontinuing amlodipine despite its likely cause of pedal edema

## 2019-10-22 NOTE — PROGRESS NOTE ADULT - PROBLEM SELECTOR PLAN 1
Patient presents with two month history of cellulitis that has failed outpatient therapy. Erythema is spreading up to ankle and erythema has appeared on opposite foot. s/p 1g vancomycin x1.  - continue with Vancomycin  - begin Ceftriaxone  - monitor vitals and trend CBC Patient presents with two month history of cellulitis that has failed outpatient therapy. Erythema is spreading up to ankle and erythema has appeared on opposite foot. s/p 1g vancomycin x1.  - continue with Vancomycin (10/21)  - begin Ceftriaxone (10/22)  - monitor vitals and trend CBC  - X-Ray of foot to r/o osteo and OM

## 2019-10-22 NOTE — CONSULT NOTE ADULT - ASSESSMENT
PRELIM NOTE NOT FINAL    69yo male with metastatic lung cancer on maintenance Pembrolizumab and Pemetrexed presents with right foot pain    right foot pain- cellulitis vs DVT  on IV antibiotics  check ultrasound  appreciate excellent medical care 67yo male with metastatic lung cancer on maintenance Pembrolizumab and Pemetrexed presents with right foot swelling found to have b/l lower extremity erythema /rash and swelling    b/l lower extremity erythema and edema- cellulitis  vs immune therapy related adverse effect vs drug effect vs DVT  on IV antibiotics and being treated for infection/cellulitis  consult ID given recurrent symptoms and persistence  doppler negative for DVT   could this be a side effect from alimta vs pembro? consider skin biopsy? the rash is b/l   check blood cultures  appreciate excellent medical care     metastatic lung cancer- now on maintenance Pembrolizumab and Pemetrexed  received 4 cycles of carboplatin then continued pembro and Alimta.  PET scan done on 9-26-19 showing " FDG avid centrally necrotic left upper lobe mass increased in size  since CT chest June 5, 2019, but overall decreased since PET/CT January 29, 2019.  Near complete resolution of FDG avid left perihilar/aorticopulmonary metastatic lymphadenopathy.   New subcentimeter FDG avid focus localizing to anterior limb right   adrenal gland, suspicious for adrenal metastasis."  patient was referred to radiation oncology for RT to left upper lobe mass   follows with Dr. Turner outpatient , will need follow up with her after he is discharged

## 2019-10-22 NOTE — PROGRESS NOTE ADULT - PROBLEM SELECTOR PLAN 2
The patient was diagnosed approximately one year ago. Oncologist is Dr. Milka Rodgers at [facility]. Patient was originally treated with four rounds of Keytruda, Olympta, and carboplatin. The carboplatin was stopped and he continues to be treated with the Keytruda and Olympta. He will be starting radiation therapy soon. He will also be partaking in a drug trial at [facility]. His current chemotherapy regimen causes constipation for which he takes senna and colace.

## 2019-10-22 NOTE — CONSULT NOTE ADULT - SUBJECTIVE AND OBJECTIVE BOX
HPI:  68 year old male with COPD HTN, HLD, adenocarcinoma of the lung (on Keytruda and Olympta) at [facility] presents after failed outpatient treatment of cellulitis of the right foot. Two months ago the patient broke his 4th toe and metatarsal on the right and developed cellulitis The patient reports that his oncologist thought that the combination of a possible break in the dry skin of his foot combined with his lower extremity swelling secondary to amlodipine may have caused this. His podiatrist prescribed Augmentin 500 mg BID for 7 days. He completed the course and saw an improvement in his foot, but th erythema soon returned. His Oncologist soon prescribed Kephlex 500 mg TID. The patient took two doses and stopped the medication because it caused severe abdominal pain. This reaction is one he had many years ago with this medication. Last Saturday, the patient noticed that the erythema have moved up his ankle and erythema started to appear on the dorsum of the left foot. He called his Oncologist who prescribed Augmentin 500 mg TID. He tried the medication until Monday and noticed no improvement. His oncologist instructed him to go to the ED for IV antibiotics. His temperature is usually 97.5F, but last night his temperature elevated to 98.3F. He took two Tylenol tablets and this morning his temperature returned to normal. He denies chills, sick contacts, chest pain, cough, shortness of breath, nausea, vomiting or diarrhea.    Of note, the patient was diagnosed approximately one year ago with lung cancer. The patient was originally treated with four rounds of Keytruda, Olympta, and carboplatin. The carboplatin was stopped and he continues to be treated with the Keytruda and Olympta. He will be starting radiation therapy soon. He will also be partaking in a drug trial at [facility]. His current chemotherapy regimen causes constipation for which he takes senna and colace.    In the ED, vital signs were Tmax 98.7F, HR  bpm, -111/66-78 mm Hg, RR 19-20, 97-98% RA.  He received 1g vancomycin X1, lactobacillus acidophilus. (21 Oct 2019 20:22)      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  Irritable bowel syndrome  Nerve damage: posterior cervical region of neck  Inguinal hernia  Metastatic disease  Adenocarcinoma of lung  Spondylolisthesis  HLD (hyperlipidemia)  HTN (hypertension)  COPD (chronic obstructive pulmonary disease)  History of arthroscopic knee surgery      Allergies    Keflex (Other (Severe))    Intolerances        MEDICATIONS  (STANDING):  ALPRAZolam 0.5 milliGRAM(s) Oral at bedtime  amLODIPine   Tablet 10 milliGRAM(s) Oral daily  atorvastatin 20 milliGRAM(s) Oral at bedtime  cefTRIAXone   IVPB 1000 milliGRAM(s) IV Intermittent every 24 hours  clotrimazole 1% Cream 1 Application(s) Topical every 12 hours  enoxaparin Injectable 40 milliGRAM(s) SubCutaneous daily  folic acid 1 milliGRAM(s) Oral daily  melatonin 10 milliGRAM(s) Oral at bedtime  metoprolol succinate  milliGRAM(s) Oral two times a day  quinapril 40 milliGRAM(s) Oral daily  saccharomyces boulardii 250 milliGRAM(s) Oral two times a day  senna 2 Tablet(s) Oral at bedtime  umeclidinium 62.5 MICROgram(s)/vilanterol 25 MICROgram(s) Inhaler 1 Puff(s) Inhalation daily  vancomycin  IVPB 1000 milliGRAM(s) IV Intermittent every 12 hours    MEDICATIONS  (PRN):  ALBUTerol    90 MICROgram(s) HFA Inhaler 1 Puff(s) Inhalation four times a day PRN Shortness of Breath and/or Wheezing      FAMILY HISTORY:  No pertinent family history in first degree relatives      SOCIAL HISTORY: No EtOH, no tobacco    REVIEW OF SYSTEMS:    CONSTITUTIONAL: No weakness, fevers or chills  EYES/ENT: No visual changes;  No vertigo or throat pain   NECK: No pain or stiffness  RESPIRATORY: No cough, wheezing, hemoptysis; No shortness of breath  CARDIOVASCULAR: No chest pain or palpitations  GASTROINTESTINAL: No abdominal or epigastric pain. No nausea, vomiting, or hematemesis; No diarrhea or constipation. No melena or hematochezia.  GENITOURINARY: No dysuria, frequency or hematuria  NEUROLOGICAL: No numbness or weakness  SKIN: No itching, burning, rashes, or lesions   All other review of systems is negative unless indicated above.    Height (cm): 172.72 (10-21 @ 15:34)  Weight (kg): 59 (10-21 @ 15:34)  BMI (kg/m2): 19.8 (10-21 @ 15:34)  BSA (m2): 1.7 (10-21 @ 15:34)    T(F): 98.3 (10-22-19 @ 05:29), Max: 99.2 (10-21-19 @ 20:08)  HR: 85 (10-22-19 @ 11:22)  BP: 95/69 (10-22-19 @ 11:22)  RR: 18 (10-22-19 @ 11:22)  SpO2: 95% (10-22-19 @ 05:29)  Wt(kg): --    GENERAL: NAD, well-developed  HEAD:  Atraumatic, Normocephalic  EYES: EOMI, PERRLA, conjunctiva and sclera clear  NECK: Supple, No JVD  CHEST/LUNG: Clear to auscultation bilaterally; No wheeze  HEART: Regular rate and rhythm; No murmurs, rubs, or gallops  ABDOMEN: Soft, Nontender, Nondistended; Bowel sounds present  EXTREMITIES:  2+ Peripheral Pulses, No clubbing, cyanosis, or edema  NEUROLOGY: non-focal  SKIN: No rashes or lesions                          9.3    3.84  )-----------( 281      ( 22 Oct 2019 07:13 )             28.6       10-22    134<L>  |  96  |  12  ----------------------------<  107<H>  3.9   |  26  |  0.88    Ca    8.7      22 Oct 2019 07:07  Phos  2.4     10-22  Mg     1.6     10-22        Magnesium, Serum: 1.6 mg/dL (10-22 @ 07:07)  Phosphorus Level, Serum: 2.4 mg/dL (10-22 @ 07:07) HPI:  68 year old male with COPD HTN, HLD, adenocarcinoma of the lung presents with right foot swelling and redness.     He says his right leg turning red has been going on since August 2019. he has been given antibiotics for cellulitis. he also has swelling in both of his legs from what he thinks is due to amlodipine. his legs have been swollen since being on amlodipine.    Two months ago the patient broke his right 4th toe and developed cellulitis The patient reports that his oncologist thought that the combination of a possible break in the dry skin of his foot combined with his lower extremity swelling secondary to amlodipine may have caused this. His podiatrist prescribed Augmentin 500 mg BID for 7 days. He completed the course and saw an improvement in his foot, but th erythema soon returned. His Oncologist soon prescribed Kephlex 500 mg TID. The patient took two doses and stopped the medication because it caused severe abdominal pain    In the ED, vital signs were Tmax 98.7F, HR  bpm, -111/66-78 mm Hg, RR 19-20, 97-98% RA.  He received 1g vancomycin X1, lactobacillus acidophilus. (21 Oct 2019 20:22)      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  Irritable bowel syndrome  Nerve damage: posterior cervical region of neck  Inguinal hernia  Metastatic disease  Adenocarcinoma of lung  Spondylolisthesis  HLD (hyperlipidemia)  HTN (hypertension)  COPD (chronic obstructive pulmonary disease)  History of arthroscopic knee surgery      Allergies    Keflex (Other (Severe))    Intolerances        MEDICATIONS  (STANDING):  ALPRAZolam 0.5 milliGRAM(s) Oral at bedtime  amLODIPine   Tablet 10 milliGRAM(s) Oral daily  atorvastatin 20 milliGRAM(s) Oral at bedtime  cefTRIAXone   IVPB 1000 milliGRAM(s) IV Intermittent every 24 hours  clotrimazole 1% Cream 1 Application(s) Topical every 12 hours  enoxaparin Injectable 40 milliGRAM(s) SubCutaneous daily  folic acid 1 milliGRAM(s) Oral daily  melatonin 10 milliGRAM(s) Oral at bedtime  metoprolol succinate  milliGRAM(s) Oral two times a day  quinapril 40 milliGRAM(s) Oral daily  saccharomyces boulardii 250 milliGRAM(s) Oral two times a day  senna 2 Tablet(s) Oral at bedtime  umeclidinium 62.5 MICROgram(s)/vilanterol 25 MICROgram(s) Inhaler 1 Puff(s) Inhalation daily  vancomycin  IVPB 1000 milliGRAM(s) IV Intermittent every 12 hours    MEDICATIONS  (PRN):  ALBUTerol    90 MICROgram(s) HFA Inhaler 1 Puff(s) Inhalation four times a day PRN Shortness of Breath and/or Wheezing      FAMILY HISTORY:  No pertinent family history in first degree relatives      SOCIAL HISTORY: No EtOH, no tobacco    REVIEW OF SYSTEMS:    All other review of systems is negative unless indicated above.    Height (cm): 172.72 (10-21 @ 15:34)  Weight (kg): 59 (10-21 @ 15:34)  BMI (kg/m2): 19.8 (10-21 @ 15:34)  BSA (m2): 1.7 (10-21 @ 15:34)    T(F): 98.3 (10-22-19 @ 05:29), Max: 99.2 (10-21-19 @ 20:08)  HR: 85 (10-22-19 @ 11:22)  BP: 95/69 (10-22-19 @ 11:22)  RR: 18 (10-22-19 @ 11:22)  SpO2: 95% (10-22-19 @ 05:29)  Wt(kg): --    GENERAL: NAD, well-developed  HEAD:  Atraumatic, Normocephalic  EYES: EOMI, PERRLA, conjunctiva and sclera clear  NECK: Supple, No JVD  CHEST/LUNG: Clear to auscultation bilaterally; No wheeze  HEART: Regular rate and rhythm; No murmurs, rubs, or gallops  ABDOMEN: Soft, Nontender, Nondistended; Bowel sounds present  EXTREMITIES: 1+ pitting edema in b/l lower extremities from feet to mid legs. erythema on dorsum of right and left foot. NEUROLOGY: non-focal  SKIN: No rashes or lesions                          9.3    3.84  )-----------( 281      ( 22 Oct 2019 07:13 )             28.6       10-22    134<L>  |  96  |  12  ----------------------------<  107<H>  3.9   |  26  |  0.88    Ca    8.7      22 Oct 2019 07:07  Phos  2.4     10-22  Mg     1.6     10-22        Magnesium, Serum: 1.6 mg/dL (10-22 @ 07:07)  Phosphorus Level, Serum: 2.4 mg/dL (10-22 @ 07:07)      < from: NM PET/CT Onc FDG Skull to Thigh, Subsq (09.26.19 @ 19:29) >  FINDINGS:      HEAD/NECK: No abnormal avidity. Physiologic FDG activity seen in the   visualized portions of the brain, majorsalivary glands and neck muscles.    THORAX:  Near complete resolution of left perihilar and aorticopulmonary   window lymphadenopathy, with minimal residual FDG avidity in the AP   window (SUV 4.3; image 85), previously 3.0 x 2.5 cm with SUV   19.1)..Physiologic FDG activity in the myocardium and blood pool.      LUNGS: FDG avid centrally necrotic left upper lobe mass 9 x 4.0 cm (SUV   14.6; image 67), increased in size since June 5, 2019 where it measured   approximately 3.4 x 2.6 cm, but overall decreased in size PET/CT January 29, 2019 where it measured 6.3 x 4.8 cm with SUV 24.8.     Several subcentimeter pulmonary nodules are unchanged for example   measuring 0.6 cm left lower lobe (image 84), while several other   subcentimeter FDG avid nodules in the left lower lobe are no longer   identified, (for example previous 0.6 cm left lower lobe nodule with SUV   3.1). Unchanged non-FDG avid partial right middle lobe atelectasis.    < end of copied text >  < from: NM PET/CT Onc FDG Skull to Thigh, Subsq (09.26.19 @ 19:29) >  PLEURA/PERICARDIUM: No abnormal avidity. No pericardial or pleural   effusions.    HEPATOBILIARY/PANCREAS:  Liver background SUV mean as a reference for   comparing studies is 2.1, previously 2.2.    SPLEEN: No abnormal avidity.    ADRENAL GLANDS: FDG avid focus localizing to the anterior limb right   adrenal gland corresponding to a 0.8 cm nodule which is better seen on   contrast enhanced CT of September 10, 2019 (SUV 5.2; image 136).    KIDNEYS/URINARY BLADDER: Excreted activity is seen.   No abnormal avidity.    ABDOMINOPELVIC NODES:   No abnormal avidity.    BOWEL/PERITONEUM/MESENTERY:  Unchanged large right inguinal hernia   containing nonobstructive small bowel loops.    PELVIC ORGANS: No abnormal avidity.    BONES:  Minimal FDG avidity associated with healing fractures fourth,   fifth, and sixth right anterolateral ribs. Degenerative changes of the   spine with spondylolysis and spondylolisthesis L4-L5. Unchanged mild loss   of height of multiple thoracic vertebral bodies. No FDG avid osseous   lesions.    SOFT TISSUES:  No abnormal avidity.    IMPRESSION:      1.  FDG avid centrally necrotic left upper lobe mass increased in size   since CT chest June 5, 2019, but overall decreased since PET/CT January 29, 2019.    2.  Near complete resolution of FDG avid left perihilar/aorticopulmonary   metastatic lymphadenopathy.    3.  New subcentimeter FDG avid focus localizing to anterior limb right   adrenal gland, suspicious for adrenal metastasis.

## 2019-10-23 DIAGNOSIS — L53.9 ERYTHEMATOUS CONDITION, UNSPECIFIED: ICD-10-CM

## 2019-10-23 DIAGNOSIS — I48.91 UNSPECIFIED ATRIAL FIBRILLATION: ICD-10-CM

## 2019-10-23 LAB
ALBUMIN SERPL ELPH-MCNC: 2.9 G/DL — LOW (ref 3.3–5)
ALP SERPL-CCNC: 77 U/L — SIGNIFICANT CHANGE UP (ref 40–120)
ALT FLD-CCNC: 39 U/L — SIGNIFICANT CHANGE UP (ref 10–45)
ANION GAP SERPL CALC-SCNC: 10 MMOL/L — SIGNIFICANT CHANGE UP (ref 5–17)
AST SERPL-CCNC: 34 U/L — SIGNIFICANT CHANGE UP (ref 10–40)
BILIRUB SERPL-MCNC: 0.3 MG/DL — SIGNIFICANT CHANGE UP (ref 0.2–1.2)
BUN SERPL-MCNC: 12 MG/DL — SIGNIFICANT CHANGE UP (ref 7–23)
CALCIUM SERPL-MCNC: 9.1 MG/DL — SIGNIFICANT CHANGE UP (ref 8.4–10.5)
CHLORIDE SERPL-SCNC: 100 MMOL/L — SIGNIFICANT CHANGE UP (ref 96–108)
CO2 SERPL-SCNC: 24 MMOL/L — SIGNIFICANT CHANGE UP (ref 22–31)
CREAT SERPL-MCNC: 0.96 MG/DL — SIGNIFICANT CHANGE UP (ref 0.5–1.3)
GLUCOSE BLDC GLUCOMTR-MCNC: 120 MG/DL — HIGH (ref 70–99)
GLUCOSE SERPL-MCNC: 123 MG/DL — HIGH (ref 70–99)
HCT VFR BLD CALC: 31.3 % — LOW (ref 39–50)
HGB BLD-MCNC: 10.3 G/DL — LOW (ref 13–17)
MCHC RBC-ENTMCNC: 31.3 PG — SIGNIFICANT CHANGE UP (ref 27–34)
MCHC RBC-ENTMCNC: 32.9 GM/DL — SIGNIFICANT CHANGE UP (ref 32–36)
MCV RBC AUTO: 95.1 FL — SIGNIFICANT CHANGE UP (ref 80–100)
NRBC # BLD: 0 /100 WBCS — SIGNIFICANT CHANGE UP (ref 0–0)
PLATELET # BLD AUTO: 274 K/UL — SIGNIFICANT CHANGE UP (ref 150–400)
POTASSIUM SERPL-MCNC: 3.8 MMOL/L — SIGNIFICANT CHANGE UP (ref 3.5–5.3)
POTASSIUM SERPL-SCNC: 3.8 MMOL/L — SIGNIFICANT CHANGE UP (ref 3.5–5.3)
PROT SERPL-MCNC: 6.6 G/DL — SIGNIFICANT CHANGE UP (ref 6–8.3)
RBC # BLD: 3.29 M/UL — LOW (ref 4.2–5.8)
RBC # FLD: 13.3 % — SIGNIFICANT CHANGE UP (ref 10.3–14.5)
SODIUM SERPL-SCNC: 134 MMOL/L — LOW (ref 135–145)
WBC # BLD: 3.72 K/UL — LOW (ref 3.8–10.5)
WBC # FLD AUTO: 3.72 K/UL — LOW (ref 3.8–10.5)

## 2019-10-23 PROCEDURE — 99231 SBSQ HOSP IP/OBS SF/LOW 25: CPT | Mod: GC

## 2019-10-23 PROCEDURE — 99233 SBSQ HOSP IP/OBS HIGH 50: CPT | Mod: GC

## 2019-10-23 PROCEDURE — 93306 TTE W/DOPPLER COMPLETE: CPT | Mod: 26

## 2019-10-23 PROCEDURE — 93010 ELECTROCARDIOGRAM REPORT: CPT

## 2019-10-23 PROCEDURE — 73630 X-RAY EXAM OF FOOT: CPT | Mod: 26,RT

## 2019-10-23 RX ORDER — DIGOXIN 250 MCG
0.5 TABLET ORAL ONCE
Refills: 0 | Status: DISCONTINUED | OUTPATIENT
Start: 2019-10-23 | End: 2019-10-23

## 2019-10-23 RX ORDER — DIGOXIN 250 MCG
0.25 TABLET ORAL ONCE
Refills: 0 | Status: COMPLETED | OUTPATIENT
Start: 2019-10-23 | End: 2019-10-23

## 2019-10-23 RX ORDER — DILTIAZEM HCL 120 MG
15 CAPSULE, EXT RELEASE 24 HR ORAL ONCE
Refills: 0 | Status: DISCONTINUED | OUTPATIENT
Start: 2019-10-23 | End: 2019-10-23

## 2019-10-23 RX ORDER — METOPROLOL TARTRATE 50 MG
5 TABLET ORAL ONCE
Refills: 0 | Status: DISCONTINUED | OUTPATIENT
Start: 2019-10-23 | End: 2019-10-23

## 2019-10-23 RX ORDER — METOPROLOL TARTRATE 50 MG
5 TABLET ORAL ONCE
Refills: 0 | Status: COMPLETED | OUTPATIENT
Start: 2019-10-23 | End: 2019-10-23

## 2019-10-23 RX ORDER — DILTIAZEM HCL 120 MG
15 CAPSULE, EXT RELEASE 24 HR ORAL ONCE
Refills: 0 | Status: COMPLETED | OUTPATIENT
Start: 2019-10-23 | End: 2019-10-23

## 2019-10-23 RX ORDER — DILTIAZEM HCL 120 MG
30 CAPSULE, EXT RELEASE 24 HR ORAL EVERY 6 HOURS
Refills: 0 | Status: DISCONTINUED | OUTPATIENT
Start: 2019-10-23 | End: 2019-10-24

## 2019-10-23 RX ORDER — DILTIAZEM HCL 120 MG
10 CAPSULE, EXT RELEASE 24 HR ORAL ONCE
Refills: 0 | Status: COMPLETED | OUTPATIENT
Start: 2019-10-23 | End: 2019-10-23

## 2019-10-23 RX ORDER — DIGOXIN 250 MCG
0.5 TABLET ORAL ONCE
Refills: 0 | Status: COMPLETED | OUTPATIENT
Start: 2019-10-23 | End: 2019-10-23

## 2019-10-23 RX ORDER — SODIUM CHLORIDE 9 MG/ML
500 INJECTION INTRAMUSCULAR; INTRAVENOUS; SUBCUTANEOUS ONCE
Refills: 0 | Status: COMPLETED | OUTPATIENT
Start: 2019-10-23 | End: 2019-10-23

## 2019-10-23 RX ORDER — DIGOXIN 250 MCG
0.25 TABLET ORAL DAILY
Refills: 0 | Status: DISCONTINUED | OUTPATIENT
Start: 2019-10-24 | End: 2019-10-24

## 2019-10-23 RX ADMIN — SODIUM CHLORIDE 500 MILLILITER(S): 9 INJECTION INTRAMUSCULAR; INTRAVENOUS; SUBCUTANEOUS at 06:36

## 2019-10-23 RX ADMIN — SENNA PLUS 2 TABLET(S): 8.6 TABLET ORAL at 21:05

## 2019-10-23 RX ADMIN — Medication 250 MILLIGRAM(S): at 18:32

## 2019-10-23 RX ADMIN — Medication 10 MILLIGRAM(S): at 01:30

## 2019-10-23 RX ADMIN — Medication 0.25 MILLIGRAM(S): at 21:06

## 2019-10-23 RX ADMIN — Medication 1 MILLIGRAM(S): at 10:26

## 2019-10-23 RX ADMIN — Medication 100 MILLIGRAM(S): at 10:26

## 2019-10-23 RX ADMIN — Medication 100 MILLIGRAM(S): at 21:08

## 2019-10-23 RX ADMIN — CEFTRIAXONE 100 MILLIGRAM(S): 500 INJECTION, POWDER, FOR SOLUTION INTRAMUSCULAR; INTRAVENOUS at 23:16

## 2019-10-23 RX ADMIN — Medication 30 MILLIGRAM(S): at 18:42

## 2019-10-23 RX ADMIN — Medication 10 MILLIGRAM(S): at 21:05

## 2019-10-23 RX ADMIN — Medication 0.5 MILLIGRAM(S): at 04:17

## 2019-10-23 RX ADMIN — Medication 15 MILLIGRAM(S): at 04:13

## 2019-10-23 RX ADMIN — Medication 5 MILLIGRAM(S): at 01:51

## 2019-10-23 RX ADMIN — Medication 1 APPLICATION(S): at 18:43

## 2019-10-23 RX ADMIN — ENOXAPARIN SODIUM 40 MILLIGRAM(S): 100 INJECTION SUBCUTANEOUS at 13:03

## 2019-10-23 RX ADMIN — Medication 30 MILLIGRAM(S): at 23:46

## 2019-10-23 RX ADMIN — Medication 250 MILLIGRAM(S): at 06:35

## 2019-10-23 RX ADMIN — Medication 1 APPLICATION(S): at 06:35

## 2019-10-23 RX ADMIN — Medication 250 MILLIGRAM(S): at 06:36

## 2019-10-23 RX ADMIN — ATORVASTATIN CALCIUM 20 MILLIGRAM(S): 80 TABLET, FILM COATED ORAL at 06:35

## 2019-10-23 RX ADMIN — UMECLIDINIUM BROMIDE AND VILANTEROL TRIFENATATE 1 PUFF(S): 62.5; 25 POWDER RESPIRATORY (INHALATION) at 11:47

## 2019-10-23 RX ADMIN — Medication 250 MILLIGRAM(S): at 18:42

## 2019-10-23 RX ADMIN — Medication 0.25 MILLIGRAM(S): at 13:03

## 2019-10-23 RX ADMIN — Medication 5 MILLIGRAM(S): at 01:10

## 2019-10-23 RX ADMIN — Medication 0.5 MILLIGRAM(S): at 21:05

## 2019-10-23 RX ADMIN — Medication 0.5 MILLIGRAM(S): at 02:43

## 2019-10-23 NOTE — PROGRESS NOTE ADULT - PROBLEM SELECTOR PLAN 3
- continue metoprolol succinate 100 mg BID, amlodipine 10 mg daily and quinapril 40 mg daily (from home)  - Patient not interested in discontinuing amlodipine despite its likely cause of pedal edema - Diagnosed 1 year ago  - Follows oncologist is Dr. Milka Rodgers at [facility]. - S/p 4 rounds of Keytruda, Olympta, and carboplatin; now on Keytruda and Olympta, to start radiation, drug trial at [facility].   - Per Oncology, no inpatient treatment, to follow-up with oncologist outpatient

## 2019-10-23 NOTE — PROGRESS NOTE ADULT - ASSESSMENT
69yo male with metastatic lung cancer on maintenance Pembrolizumab and Pemetrexed presents with right foot swelling found to have b/l lower extremity erythema /rash and swelling    # b/l lower extremity erythema and edema  cellulitis  vs immune therapy related adverse effect. Duplex was negative for DVT. He was on oral Abx with different regimens intermittently but the erythema did not improve. No sign of osteomyelitis in x-ray on this admission. Not typical clinical picture for cellulitis. Possibly irAE.     - Consult dermatology for potential biopsy  - F/U culture results  - Continue on IV antibiotics and being treated for infection/cellulitis  - consult ID given recurrent symptoms and persistence    # metastatic lung cancer  Now on maintenance Pembrolizumab and Pemetrexed. received 4 cycles of carboplatin then continued pembro and Alimta. PET scan done on 9-26-19 showing " FDG avid centrally necrotic left upper lobe mass increased in size  since CT chest June 5, 2019, but overall decreased since PET/CT January 29, 2019.  Near complete resolution of FDG avid left perihilar/aorticopulmonary metastatic lymphadenopathy.   New subcentimeter FDG avid focus localizing to anterior limb right adrenal gland, suspicious for adrenal metastasis." patient was referred to radiation oncology for RT to left upper lobe mass   - F/U with Dr. Rodgers outpatient 67yo male with metastatic lung cancer on maintenance Pembrolizumab and Pemetrexed presents with right foot swelling found to have b/l lower extremity erythema /rash and swelling    # b/l lower extremity erythema and edema  cellulitis  vs immune therapy related adverse effect. Duplex was negative for DVT. He was on oral Abx with different regimens intermittently but the erythema did not improve. No sign of osteomyelitis in x-ray on this admission. Not typical clinical picture for cellulitis. Possibly irAE.     - Consult dermatology for potential biopsy  - F/U culture results  - Continue on IV antibiotics and being treated for infection/cellulitis  - consult ID given recurrent symptoms and persistence    # metastatic lung cancer  Now on maintenance Pembrolizumab and Pemetrexed. received 4 cycles of carboplatin then continued pembro and Alimta. PET scan done on 9-26-19 showing " FDG avid centrally necrotic left upper lobe mass increased in size  since CT chest June 5, 2019, but overall decreased since PET/CT January 29, 2019.  Near complete resolution of FDG avid left perihilar/aorticopulmonary metastatic lymphadenopathy.   New subcentimeter FDG avid focus localizing to anterior limb right adrenal gland, suspicious for adrenal metastasis." patient was referred to radiation oncology for RT to left upper lobe mass   - F/U with Dr. Rodgers outpatient    # Afib/FL with RVR   Happened at night on 10/22. Now back to sinus  - Appreciate EP recommendations  - Metoprolol 100 BID, digoxin load 1g for today, diltiazem 30 mg Q6H    The case was discussed with Dr. Joselin Vasquez MD, MPH  Hematology/Oncology Fellow  Pager: [phone]

## 2019-10-23 NOTE — CONSULT NOTE ADULT - SUBJECTIVE AND OBJECTIVE BOX
For all Cardiology service contact information, go to [email or web address] and use "[employer]" to login.    HPI:    68M with COPD HTN, HLD, adenocarcinoma of the lung (on Keytruda and Olympta) at [facility] presents after failed outpatient treatment of cellulitis of the right foot, admitted for IV antibiotic treatment. Overnight at 10pm pt had VS of HR in the 130-140s but was asymptomatic and got his home meds of metoprolol and had initially went down briefly to the 70s but then went up to the 140s again shortly after and transferred to tele bed. His first EKG showed Afib w/ RVR and his second EKG showed Aflutter. He has gotten 2x IV metoprolol 5mg and 10mg IV diltiazem which did not bring down his HR. A RRT was called for sustained tachycardia.     I was consulted during RRT for management. Patient was asymptomatic with stable BP. ECG showed atrial flutter with 2:1 conduction at ventricular rates 140-150. Echo from 2017 reviewed, and patient did not have any history of structural heart disease at that time. Given Diltiazem 15 mg IV with no response, and given 0.5 mg po.     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  Irritable bowel syndrome  Nerve damage: posterior cervical region of neck  Inguinal hernia  Metastatic disease  Adenocarcinoma of lung  Spondylolisthesis  HLD (hyperlipidemia)  HTN (hypertension)  COPD (chronic obstructive pulmonary disease)  History of arthroscopic knee surgery    SHx:   -------------------------------------------------------------------------------------------  ROS:  CV: chest pain (-), palpitation (-) PULM: SOB (-), cough (-), wheezing (-), hemoptysis (-). CONST: fever (-), chills (-) or fatigue (-). HEENT:Visual changes (-); vertigo or throat pain (-);  neck stiffness (-). GI: abdominal pain (-) , nausea/vomiting (-), hematemesis, (-)melena (-), hematochezia (-). : dysuria (-), frequency (-), hematuria (-). NEURO: numbness (-), weakness (-). SKIN: itching (-), rash (-)    All other review of systems is negative unless indicated above.   -------------------------------------------------------------------------------------------  VS:  T(C): 36.8 (10-23-19 @ 02:56), Max: 36.8 (10-23-19 @ 02:56)  HR: 121 (10-23-19 @ 07:38) (65 - 150)  BP: 92/69 (10-23-19 @ 07:38) (80/40 - 111/80)  RR: 20 (10-23-19 @ 02:56) (18 - 20)  SpO2: 98% (10-23-19 @ 02:56) (79% - 98%)  I&O's Summary    22 Oct 2019 07:01  -  23 Oct 2019 07:00  --------------------------------------------------------  IN: 1080 mL / OUT: 200 mL / NET: 880 mL  -------------------------------------------------------------------------------------------  EXAM:   GEN: No acute distress; well appearing.  CV: irregularly, irregular, (-) murmur   PULM: CTAB   HEENT: PERRL, EOMI, No scleral icterus. Normal mucosa.  GI: soft, non-tender, non-distended.   MSK: No joint deformity.  NEURO: Non-focal.  LYMPH: No lymphadenopathy.  PSY: Mood & affect appropriate.  SKIN: No rashes, ecchymoses, or cyanosis.  -------------------------------------------------------------------------------------------  LABS:                        10.3   3.72  )-----------( 274      ( 23 Oct 2019 05:51 )             31.3       10-23    134<L>  |  100  |  12  ----------------------------<  123<H>  3.8   |  24  |  0.96    Ca    9.1      23 Oct 2019 05:51  Phos  2.4     10-22  Mg     1.6     10-22    TPro  6.6  /  Alb  2.9<L>  /  TBili  0.3  /  DBili  x   /  AST  34  /  ALT  39  /  AlkPhos  77  10-23     History of arthroscopic knee surgery  No significant past surgical history     ALBUTerol    90 MICROgram(s) HFA Inhaler 1 Puff(s) Inhalation four times a day PRN  ALPRAZolam 0.5 milliGRAM(s) Oral at bedtime  atorvastatin 20 milliGRAM(s) Oral at bedtime  cefTRIAXone   IVPB 1000 milliGRAM(s) IV Intermittent every 24 hours  clotrimazole 1% Cream 1 Application(s) Topical every 12 hours  digoxin  Injectable 0.25 milliGRAM(s) IV Push once  digoxin  Injectable 0.25 milliGRAM(s) IV Push once  enoxaparin Injectable 40 milliGRAM(s) SubCutaneous daily  folic acid 1 milliGRAM(s) Oral daily  melatonin 10 milliGRAM(s) Oral at bedtime  metoprolol succinate  milliGRAM(s) Oral two times a day  saccharomyces boulardii 250 milliGRAM(s) Oral two times a day  senna 2 Tablet(s) Oral at bedtime  umeclidinium 62.5 MICROgram(s)/vilanterol 25 MICROgram(s) Inhaler 1 Puff(s) Inhalation daily  vancomycin  IVPB 1000 milliGRAM(s) IV Intermittent every 12 hours     Keflex (Other (Severe))    -------------------------------------------------------------------------------------------  Current Meds:  ALBUTerol    90 MICROgram(s) HFA Inhaler 1 Puff(s) Inhalation four times a day PRN  ALPRAZolam 0.5 milliGRAM(s) Oral at bedtime  atorvastatin 20 milliGRAM(s) Oral at bedtime  cefTRIAXone   IVPB 1000 milliGRAM(s) IV Intermittent every 24 hours  clotrimazole 1% Cream 1 Application(s) Topical every 12 hours  digoxin  Injectable 0.25 milliGRAM(s) IV Push once  digoxin  Injectable 0.25 milliGRAM(s) IV Push once  enoxaparin Injectable 40 milliGRAM(s) SubCutaneous daily  folic acid 1 milliGRAM(s) Oral daily  melatonin 10 milliGRAM(s) Oral at bedtime  metoprolol succinate  milliGRAM(s) Oral two times a day  saccharomyces boulardii 250 milliGRAM(s) Oral two times a day  senna 2 Tablet(s) Oral at bedtime  umeclidinium 62.5 MICROgram(s)/vilanterol 25 MICROgram(s) Inhaler 1 Puff(s) Inhalation daily  vancomycin  IVPB 1000 milliGRAM(s) IV Intermittent every 12 hours    -------------------------------------------------------------------------------------------  Telemetry reviewed. New ECG in chart reviewed. New Echocardiogram in chart reviewed.  New Stress Testing in chart reviewed. New Cardiac Catheterization in chart reviewed. New imaging reviewed.     -------------------------------------------------------------------------------------------  ASSESSMENT AND PLAN:     68M with COPD, HTN, HLD, adenocarcinoma of the lung (on Keytruda and Olympta) at [facility] initially presenting with cellulitis found to have Atrial flutter with RVR     - Monitor on telemetry, some of his strips shows possible atrial fibrillation as well. May be not be a good ablation candidate if he has concurrent fibrillation.   - continue metoprolol 100 ER bid (home dose), Diltiazem 30 Q6h prn with hold parameters for HR <60, BP <90/60  - Given digoxin 0.5 mg, give another 0.5 mg today for a total loading dose of 1 g, followed by 0.25 mg oral daily. Do not check a digoxin level today.   - obtain echocardiogram to assess for structural disease, and discuss possible RODOLFO/cardioversion if no significant LA dilation or valvular disease.   - CHADVASc score 2, consider anticoagulation options with patient. Did not want to discuss with me overnight.     Hay Ohara  Cardiology Fellow   [phone]     For all Cardiology service contact information, go to [email or web address] and use "[employer]" to login.

## 2019-10-23 NOTE — PROGRESS NOTE ADULT - SUBJECTIVE AND OBJECTIVE BOX
Hematology/Oncology Follow-up    INTERVAL HPI/OVERNIGHT EVENTS:  Developed Afib/FL with RVR and upgraded to tele. No HR is in 70s. No chest pain, palpitation. No dyspnea.     REVIEW OF SYSTEMS:  General: Negative for fatigue, chills, fever, night sweats, unintentional weight change.  Head: Negative for HA  Eyes: Negative for vision changes, diplopia, impaired vision.  ENT: Negative for tinnitus, vertigo, hearing impairment, postnasal drip, sore throat.  Respiratory: Negative for shortness of breath, cough, sputum.  Cardiovascular: Negative for chest pain, palpations, edema.  Gastrointestinal: Negative for dysphagia, nausea, vomiting, hematemesis, abdominal pain, diarrhea, constipation, hematochezia, tarry stool.  MSK: Negative for myalgia, joint pain, neck stiffness, weakness, back pain.  Neuro: Negative for weakness, loss of balance.   Skin: POSITIVE for erythema in bilateral feet  Psych: Negative for anxiety, depression, sleep disturbance.    VITAL SIGNS:  T(F): 98 (10-23-19 @ 12:03)  HR: 68 (10-23-19 @ 12:03)  BP: 92/63 (10-23-19 @ 12:03)  RR: 18 (10-23-19 @ 12:03)  SpO2: 94% (10-23-19 @ 12:03)  Wt(kg): --    10-22-19 @ 07:01  -  10-23-19 @ 07:00  --------------------------------------------------------  IN: 1080 mL / OUT: 200 mL / NET: 880 mL        PHYSICAL EXAM:  Constitutional: NAD   Eyes: sclera non-icteric, conjunctiva non-anemia  Neck: supple, no masses, no elevated JVP  Mouth: No mucositis, no exudate, no ulcer.   Respiratory: CTA b/l  Cardiovascular: Normal rate regular rhythm. no murmur  Gastrointestinal: soft and flat, no tenderness to palpation, normoactive bowel sound  Extremities:  No c/c  Feets; Erythema on lateral side of right foot and on the roots of 3-4 digits in left. + tenderness and warmth  MSK: No joint swelling, erythema, or tenderness   Neurological: AOx3, Cranial nerves II-XII grossly intact  Skin: No rash  Psych: Normal affect    MEDICATIONS  (STANDING):  ALPRAZolam 0.5 milliGRAM(s) Oral at bedtime  atorvastatin 20 milliGRAM(s) Oral at bedtime  cefTRIAXone   IVPB 1000 milliGRAM(s) IV Intermittent every 24 hours  clotrimazole 1% Cream 1 Application(s) Topical every 12 hours  digoxin  Injectable 0.25 milliGRAM(s) IV Push once  digoxin  Injectable 0.25 milliGRAM(s) IV Push once  enoxaparin Injectable 40 milliGRAM(s) SubCutaneous daily  folic acid 1 milliGRAM(s) Oral daily  melatonin 10 milliGRAM(s) Oral at bedtime  metoprolol succinate  milliGRAM(s) Oral two times a day  saccharomyces boulardii 250 milliGRAM(s) Oral two times a day  senna 2 Tablet(s) Oral at bedtime  umeclidinium 62.5 MICROgram(s)/vilanterol 25 MICROgram(s) Inhaler 1 Puff(s) Inhalation daily  vancomycin  IVPB 1000 milliGRAM(s) IV Intermittent every 12 hours    MEDICATIONS  (PRN):  ALBUTerol    90 MICROgram(s) HFA Inhaler 1 Puff(s) Inhalation four times a day PRN Shortness of Breath and/or Wheezing      Keflex (Other (Severe))      LABS:                        10.3   3.72  )-----------( 274      ( 23 Oct 2019 05:51 )             31.3     10-23    134<L>  |  100  |  12  ----------------------------<  123<H>  3.8   |  24  |  0.96    Ca    9.1      23 Oct 2019 05:51  Phos< from: Xray Foot AP + Lateral + Oblique, Right (10.23.19 @ 10:57) >    IMPRESSION:   There is diffuse soft tissue swelling in the foot and ankle. No acute   displaced fracture or dislocation is seen. Apparent irregularity of the   fourth toe proximal phalanx may be projectional. No acute cortical   erosive changes are seen. Mild arthritic changes at the first   metatarsophalangeal joint. Hallux valgus.    < end of copied text >    2.4     10-22  Mg     1.6     10-22    TPro  6.6  /  Alb  2.9<L>  /  TBili  0.3  /  DBili  x   /  AST  34  /  ALT  39  /  AlkPhos  77  10-23      RADIOLOGY & ADDITIONAL TESTS:  Studies reviewed.

## 2019-10-23 NOTE — PROGRESS NOTE ADULT - PROBLEM SELECTOR PLAN 4
- continue with albuterol  - patient is on Anoro Ellipta at home as well, will let him know to bring it in - C/w metoprolol succinate 100 mg BID, amlodipine 10 mg daily and quinapril 40 mg daily (from home)  - Patient wants to continue amlodipine despite its likely cause of pedal edema

## 2019-10-23 NOTE — PROGRESS NOTE ADULT - PROBLEM SELECTOR PLAN 1
Patient presents with two month history of cellulitis that has failed outpatient therapy. Erythema is spreading up to ankle and erythema has appeared on opposite foot. s/p 1g vancomycin x1.  - continue with Vancomycin (10/21)  - begin Ceftriaxone (10/22)  - monitor vitals and trend CBC  - X-Ray of foot to r/o osteo and OM - Abrupt onset of Afib/Flutter likely 2/2 indeterminate cause  - Metoprolol 100 BID, digoxin load 1g for today, diltiazem 30 mg Q6H  - Cardiology following

## 2019-10-23 NOTE — PROGRESS NOTE ADULT - ASSESSMENT
68 year old male with COPD HTN, HLD, adenocarcinoma of the lung (on Keytruda and Olympta) at [facility] presents after failed outpatient treatment of cellulitis of the right foot, admitted for IV antibiotic treatment.

## 2019-10-23 NOTE — PROGRESS NOTE ADULT - PROBLEM SELECTOR PROBLEM 5
Hyperlipidemia, unspecified hyperlipidemia type Chronic obstructive pulmonary disease, unspecified COPD type

## 2019-10-23 NOTE — PROGRESS NOTE ADULT - PROBLEM SELECTOR PLAN 2
The patient was diagnosed approximately one year ago. Oncologist is Dr. Milka Rodgers at [facility]. Patient was originally treated with four rounds of Keytruda, Olympta, and carboplatin. The carboplatin was stopped and he continues to be treated with the Keytruda and Olympta. He will be starting radiation therapy soon. He will also be partaking in a drug trial at [facility]. His current chemotherapy regimen causes constipation for which he takes senna and colace. - 2 mo history of b/l erythema (R>L), initially thought to be cellulitis, which has failed Augmentin therapy x 2 (completed) and Keflex (incomplete due to ABD pain), s/p 1g vancomycin x1.  - Thought to be an immune rxn to cancer therapy vs cellulitis  - C/w vancomycin (10/21), ceftriaxone (10/22)  - Dermatology consult  - Monitor vitals and trend CBC

## 2019-10-23 NOTE — CONSULT NOTE ADULT - ASSESSMENT
Post-round addendum:  - Recommend starting Eliquis 5mg BID for anticoagulation for a. fib  - Started on digoxin with load, continue rate control as above  - Will follow up on TTE results    Patient discussed with attending.    Gale Colvin MD  Cardiology Fellow  [phone]  All Cardiology service information can be found 24/7 on [email or web address], password: [email or web address]

## 2019-10-23 NOTE — PROGRESS NOTE ADULT - SUBJECTIVE AND OBJECTIVE BOX
Contact Information:  Maria D Mo II, MD, MPH  PGY-1, Internal Medicine  Pager: [phone] ([facility]) /// [phone] ([facility])    VICK ALLEN, MRN-[id number]    Patient is a 68y old  Male who presents with a chief complaint of Cellulitis (22 Oct 2019 12:15)      OVERNIGHT EVENTS:    SUBJECTIVE:    CONSTITUTIONAL: No weakness. No fatigue. No fever.  HEAD: No head trauma.   EYES: No vision changes.  ENT: No hearing changes or tinnitus. No ear pain. No changes in smell. No nasal congestion or discharge. No sore throat. No voice hoarseness.   NECK: No neck pain or stiffness. No lumps.  RESPIRATORY: No cough. No SOB. No wheezing. No hemoptysis.   CARDIOVASCULAR: No chest pain. No palpitations.   GASTROINTESTINAL: No dysphagia. No ABD pain. No distension. No constipation. No diarrhea. No pain with defecation. No hematemesis. No hematochezia or melena.  BACK: No back pain.  GENITOURINARY: No dysuria. No frequency or urgency. No hesitancy. No incontinence. No urinary retention. No suprapubic pain. No hematuria.  EXTREMITY: No swelling.  MUSCULOSKELETAL: No joint pain or swelling. No fractures. No stiffness.    SKIN: No rashes. No itching. No skin, hair, or nail changes.  NEUROLOGICAL: No weakness or paralysis. No lightheadedness or dizziness. No HA. No numbness or tingling.   PSYCHIATRIC: No depression.       OBJECTIVE:  Vital Signs Last 24 Hrs  T(C): 36.8 (23 Oct 2019 02:56), Max: 36.8 (23 Oct 2019 02:56)  T(F): 98.2 (23 Oct 2019 02:56), Max: 98.2 (23 Oct 2019 02:56)  HR: 70 (23 Oct 2019 05:30) (65 - 150)  BP: 80/40 (23 Oct 2019 05:30) (80/40 - 111/80)  BP(mean): --  RR: 20 (23 Oct 2019 02:56) (18 - 20)  SpO2: 98% (23 Oct 2019 02:56) (79% - 98%)  I&O's Summary    22 Oct 2019 07:01  -  23 Oct 2019 07:00  --------------------------------------------------------  IN: 1080 mL / OUT: 200 mL / NET: 880 mL        MEDICATIONS  (STANDING):  ALPRAZolam 0.5 milliGRAM(s) Oral at bedtime  atorvastatin 20 milliGRAM(s) Oral at bedtime  cefTRIAXone   IVPB 1000 milliGRAM(s) IV Intermittent every 24 hours  clotrimazole 1% Cream 1 Application(s) Topical every 12 hours  digoxin  Injectable 0.25 milliGRAM(s) IV Push once  digoxin  Injectable 0.25 milliGRAM(s) IV Push once  enoxaparin Injectable 40 milliGRAM(s) SubCutaneous daily  folic acid 1 milliGRAM(s) Oral daily  melatonin 10 milliGRAM(s) Oral at bedtime  metoprolol succinate  milliGRAM(s) Oral two times a day  saccharomyces boulardii 250 milliGRAM(s) Oral two times a day  senna 2 Tablet(s) Oral at bedtime  umeclidinium 62.5 MICROgram(s)/vilanterol 25 MICROgram(s) Inhaler 1 Puff(s) Inhalation daily  vancomycin  IVPB 1000 milliGRAM(s) IV Intermittent every 12 hours    MEDICATIONS  (PRN):  ALBUTerol    90 MICROgram(s) HFA Inhaler 1 Puff(s) Inhalation four times a day PRN Shortness of Breath and/or Wheezing    Allergies    Keflex (Other (Severe))    Intolerances        CONSTITUTIONAL: No acute distress. Awake and alert.  HEAD: No evidence of trauma. Structures WNL.  EYES: +PERRL. +EOMI. No scleral icterus. No conjunctival injection.  ENT: Moist oral mucosa. No erythema. No pharyngeal exudates.   NECK: Supple. Appropriate ROM. No stiffness. No masses or lymphadenopathy.  RESPIRATORY: CTAB. No wheezes, rales, or rhonchi. No accessory muscle use. No apparent respiratory distress.  CARDIOVASCULAR: +S1/S2. No audible S3/S4. Regular rate and rhythm. No murmurs, rubs, or gallops. 2+ radial pulses x b/l UE; 2+ DP pulses x b/l LE.   GASTROINTESTINAL: Soft, nontender, nondistended. +BS. No rebound or guarding.   BACK: No spinal or paraspinal tenderness. No CVA tenderness.  EXTREMITY: No LE swelling or edema. EXTs warm to touch.  MUSCULOSKELETAL: Movement evident in all limbs. No tenderness on palpation.  DERMATOLOGICAL: No abnormal rashes or lesions.  NEUROLOGICAL: CN 2-12 grossly intact. No focal deficits. Sensation intact x 4EXT. A&Ox3 (oriented to person, place, and time).  PSYCHIATRIC: Appropriate affect.                            10.3   3.72  )-----------( 274      ( 23 Oct 2019 05:51 )             31.3       10-23    134<L>  |  100  |  12  ----------------------------<  123<H>  3.8   |  24  |  0.96    Ca    9.1      23 Oct 2019 05:51  Phos  2.4     10-22  Mg     1.6     10-22    TPro  6.6  /  Alb  2.9<L>  /  TBili  0.3  /  DBili  x   /  AST  34  /  ALT  39  /  AlkPhos  77  10-23    CAPILLARY BLOOD GLUCOSE      POCT Blood Glucose.: 120 mg/dL (23 Oct 2019 03:39)    LIVER FUNCTIONS - ( 23 Oct 2019 05:51 )  Alb: 2.9 g/dL / Pro: 6.6 g/dL / ALK PHOS: 77 U/L / ALT: 39 U/L / AST: 34 U/L / GGT: x                   Culture - Blood (collected 21 Oct 2019 22:47)  Source: .Blood  Preliminary Report (22 Oct 2019 23:01):    No growth to date.    Culture - Blood (collected 21 Oct 2019 22:47)  Source: .Blood  Preliminary Report (22 Oct 2019 23:01):    No growth to date.          RADIOLOGY AND ADDITIONAL TESTS:    CONSULTANT NOTES REVIEWED:    CARE DISCUSSED WITH THE FOLLOWING CONSULTANTS/PROVIDERS: Contact Information:  Maria D Mo II, MD, MPH  PGY-1, Internal Medicine  Pager: [phone] ([facility]) /// [phone] ([facility])    VICK ALLEN, MRN-[id number]    Patient is a 68y old  Male who presents with a chief complaint of Cellulitis (22 Oct 2019 12:15)      OVERNIGHT EVENTS: Overnight, patient had an RRT called for AFlutter/AFib. Given metoprolol and diltiazem, still remained with tachycardia. Started on digoxin, moved to telemetry for monitoring.     SUBJECTIVE: Patient evaluated at bedside. No acute complaints. Denies foot pain/discomfort, palpitations, chest pain.    CONSTITUTIONAL: No weakness. No fatigue. No fever.  HEAD: No head trauma.   EYES: No vision changes.  ENT: No hearing changes or tinnitus. No ear pain. No changes in smell. No nasal congestion or discharge. No sore throat. No voice hoarseness.   NECK: No neck pain or stiffness. No lumps.  RESPIRATORY: No cough. No SOB. No wheezing. No hemoptysis.   CARDIOVASCULAR: No chest pain. No palpitations.   GASTROINTESTINAL: No dysphagia. No ABD pain. No distension. No constipation. No diarrhea. No pain with defecation. No hematemesis. No hematochezia or melena.  BACK: No back pain.  GENITOURINARY: No dysuria. No frequency or urgency. No hesitancy. No incontinence. No urinary retention. No suprapubic pain. No hematuria.  EXTREMITY: No swelling.  MUSCULOSKELETAL: No joint pain or swelling. No fractures. No stiffness.    SKIN: No rashes. No itching. No skin, hair, or nail changes.  NEUROLOGICAL: No weakness or paralysis. No lightheadedness or dizziness. No HA. No numbness or tingling.   PSYCHIATRIC: No depression.       OBJECTIVE:  Vital Signs Last 24 Hrs  T(C): 36.8 (23 Oct 2019 02:56), Max: 36.8 (23 Oct 2019 02:56)  T(F): 98.2 (23 Oct 2019 02:56), Max: 98.2 (23 Oct 2019 02:56)  HR: 70 (23 Oct 2019 05:30) (65 - 150)  BP: 80/40 (23 Oct 2019 05:30) (80/40 - 111/80)  BP(mean): --  RR: 20 (23 Oct 2019 02:56) (18 - 20)  SpO2: 98% (23 Oct 2019 02:56) (79% - 98%)  I&O's Summary    22 Oct 2019 07:01  -  23 Oct 2019 07:00  --------------------------------------------------------  IN: 1080 mL / OUT: 200 mL / NET: 880 mL        MEDICATIONS  (STANDING):  ALPRAZolam 0.5 milliGRAM(s) Oral at bedtime  atorvastatin 20 milliGRAM(s) Oral at bedtime  cefTRIAXone   IVPB 1000 milliGRAM(s) IV Intermittent every 24 hours  clotrimazole 1% Cream 1 Application(s) Topical every 12 hours  digoxin  Injectable 0.25 milliGRAM(s) IV Push once  digoxin  Injectable 0.25 milliGRAM(s) IV Push once  enoxaparin Injectable 40 milliGRAM(s) SubCutaneous daily  folic acid 1 milliGRAM(s) Oral daily  melatonin 10 milliGRAM(s) Oral at bedtime  metoprolol succinate  milliGRAM(s) Oral two times a day  saccharomyces boulardii 250 milliGRAM(s) Oral two times a day  senna 2 Tablet(s) Oral at bedtime  umeclidinium 62.5 MICROgram(s)/vilanterol 25 MICROgram(s) Inhaler 1 Puff(s) Inhalation daily  vancomycin  IVPB 1000 milliGRAM(s) IV Intermittent every 12 hours    MEDICATIONS  (PRN):  ALBUTerol    90 MICROgram(s) HFA Inhaler 1 Puff(s) Inhalation four times a day PRN Shortness of Breath and/or Wheezing    Allergies    Keflex (Other (Severe))    Intolerances        CONSTITUTIONAL: No acute distress. Awake and alert.  EYES: +EOMI. No scleral icterus. No conjunctival injection.  ENT: Moist oral mucosa. No erythema. No pharyngeal exudates.   NECK: Supple.  RESPIRATORY: CTAB. No wheezes, rales, or rhonchi. No accessory muscle use. No apparent respiratory distress.  CARDIOVASCULAR: +S1/S2. Tachycardic and irregular. No murmurs, rubs, or gallops. LE pulses attenuated due to LE edema.  GASTROINTESTINAL: Soft, nontender, nondistended. +BS. No rebound or guarding.   EXTREMITY: B/l dorsal foot swelling; dorsal erythema on R foot to ankle, dorsal erythema on L foot proximal to metatarsals  MUSCULOSKELETAL: Movement evident in all limbs. No tenderness on palpation.  DERMATOLOGICAL: Erythema on dorsal aspect of both feet.  NEUROLOGICAL: CN 2-12 grossly intact. No focal deficits. Sensation intact x 4EXT. A&Ox3 (oriented to person, place, and time).  PSYCHIATRIC: Appropriate affect.                              10.3   3.72  )-----------( 274      ( 23 Oct 2019 05:51 )             31.3       10-23    134<L>  |  100  |  12  ----------------------------<  123<H>  3.8   |  24  |  0.96    Ca    9.1      23 Oct 2019 05:51  Phos  2.4     10-22  Mg     1.6     10-22    TPro  6.6  /  Alb  2.9<L>  /  TBili  0.3  /  DBili  x   /  AST  34  /  ALT  39  /  AlkPhos  77  10-23    CAPILLARY BLOOD GLUCOSE      POCT Blood Glucose.: 120 mg/dL (23 Oct 2019 03:39)    LIVER FUNCTIONS - ( 23 Oct 2019 05:51 )  Alb: 2.9 g/dL / Pro: 6.6 g/dL / ALK PHOS: 77 U/L / ALT: 39 U/L / AST: 34 U/L / GGT: x                   Culture - Blood (collected 21 Oct 2019 22:47)  Source: .Blood  Preliminary Report (22 Oct 2019 23:01):    No growth to date.    Culture - Blood (collected 21 Oct 2019 22:47)  Source: .Blood  Preliminary Report (22 Oct 2019 23:01):    No growth to date.          RADIOLOGY AND ADDITIONAL TESTS:    CONSULTANT NOTES REVIEWED:    CARE DISCUSSED WITH THE FOLLOWING CONSULTANTS/PROVIDERS:

## 2019-10-23 NOTE — RAPID RESPONSE TEAM SUMMARY - NSSITUATIONBACKGROUNDRRT_GEN_ALL_CORE
68 year old male with COPD HTN, HLD, adenocarcinoma of the lung (on Keytruda and Olympta) at [facility] presents after failed outpatient treatment of cellulitis of the right foot, admitted for IV antibiotic treatment. Overnight at 10pm pt had VS of HR in the 130-140s but was asymptomatic and got his home meds of metoprolol and had initially went down briefly to the 70s but then went up to the 140s again shortly after and transferred to tele bed. His first EKG showed Afib w/ RVR and his second EKG showed Aflutter. He has gotten 2x IV metoprolol 5mg and 10mg IV diltiazem which did not bring down his HR. A RRT was called for sustained tachycardia. His BP was stable throughout in the 95-110s sBP and asymptomatic. Cardio fellow was consulted and suggested to do more diltiazem IV pushes and start digoxin if still not resolving and will contact EP in the morning for f/u.   15 IV diltiazem was given and still HR was elevated and rhythm showed Aflutter on the EKG. The pt was then given digoxin 0.5 mg and decided since asymptomatic and BP stable will monitor HR for now on tele and EP f/u.
This is a 68 year old male with COPD HTN, HLD, adenocarcinoma of the lung on chemotherapy presents with right foot swelling and redness. RRT called for Aflutter in 140s-150s. on arrival to RRT, pt asymptomatic A&O x 3, afebrile, hemodynamically stable. Team tried beta blocker and calcium channel blocker with some improvement. Cardiology consulted. 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 Team will try digoxin as per cards recommendation. Pt also need electrophysiology consult. Pt on telemonitoring.

## 2019-10-23 NOTE — CHART NOTE - NSCHARTNOTEFT_GEN_A_CORE
Dermatology Brief Chart Note    HPI:  69 y/o M h/o COPD, HTN, stage IV lung adenocarcinoma on pemetrexed/pembrolizumab admitted for cellulitis of R foot x 2 months. Broke R 4th toe 2 months ago, and subsequently noticed an area of redness on the dorsal foot. Now s/p partial courses of augmentin, Keflex, doxycycline without significant improvement. Presented to ED on 10/21 and was admitted for r/o cellulitis. Remains afebrile, w/o leukocytosis. Started on vanc/CTX. Not itchy, only painful on deep palpation. No hx of ulceration or drainage.     PHYSICAL EXAM:   Vital Signs Last 24 Hrs  T(C): 36.7 (23 Oct 2019 12:03), Max: 36.8 (23 Oct 2019 02:56)  T(F): 98 (23 Oct 2019 12:03), Max: 98.2 (23 Oct 2019 02:56)  HR: 94 (23 Oct 2019 13:11) (68 - 150)  BP: 96/59 (23 Oct 2019 13:11) (80/40 - 111/80)  BP(mean): --  RR: 18 (23 Oct 2019 12:03) (18 - 20)  SpO2: 94% (23 Oct 2019 12:03) (79% - 98%)    Skin exam:  The patient was alert, well nourished, and in no apparent distress. Oropharynx showed no ulcerations. There was no visible lymphadenopathy. Conjunctiva were non-injected. There was no clubbing or edema of extremities.    The scalp, hair, face, eyebrows, lips, oropharynx , neck, chest, back, buttocks, extremities X 4, hands, feet, nails were examined.    Of note on skin exam:   - blanchable, ill defined erythematous patch over b/l dorsal feet, symmetric bilaterally. 2+ pitting edema to mid calf  - palms/soles not involved     ASSESSMENT/PLAN:    # Bilateral dorsal foot erythema  DDx favoring stasis dermatitis > cellulitis. No tenderness, systemic s/sx, leukocytosis. Significant underlying edema appreciated bilaterally. Morphology also atypical for cutaneous AEs to pemetrexed/pembrolizumab.    - start clobetasol 0.05% oint BID to affected areas  - would apply compression with ACE wraps or stockings to b/l LE  - consider d/c of ABX per primary team and ID     Patient seen at bedside and discussed with Dermatology attending Dr. Sanches, whom pt follows with at [facility], and consult attending Dr. Molina.    Dermatology consult team will officially round and staff the patient on Friday. Please call [phone] with any questions.    Shayla Gamez MD  PGY3 Dermatology
Discussed with patient and wife the risks and benefits of anticoagulation and the lack thereof given his acute onset of atrial fibrillation. Patient and wife expressed understanding but were concerned about the impact of anticoagulation on radiation therapy and eligibility for participation in upcoming drug trial at [facility]. They stated that they interested in hearing the input of the patient's oncologist, Dr. Milka Rodgers, before making a decision regarding the initiation of anticoagulation.
Latisha Torrez | Reference #: [id number]    Others' Prescriptions  Patient Name:	Alvarado Soto	YOB: 1950  Address:	[address], [place] [address]	Sex:	Male  Rx Written	Rx Dispensed	Drug	Quantity	Days Supply	Prescriber Name  09/17/2019	09/18/2019	alprazolam 0.5 mg tablet	60	30	Milka Rodgers MD  Patient Name:	Alvarado Soto	YOB: 1950  Address:	[address] [place] [id number]	Sex:	Male  Rx Written	Rx Dispensed	Drug	Quantity	Days Supply	Prescriber Name  05/20/2019	05/23/2019	alprazolam 0.5 mg tablet	60	30	Tree Amaro  04/02/2019	04/03/2019	alprazolam 0.5 mg tablet	30	30	Tree Amaro  02/08/2019	02/13/2019	alprazolam 0.5 mg tablet	30	30	Tree Amaro  12/17/2018	12/18/2018	alprazolam 0.5 mg tablet	60	30	Tree Amaro

## 2019-10-23 NOTE — PROGRESS NOTE ADULT - PROBLEM SELECTOR PLAN 5
- continue with Lipitor 20 mg daily - continue with albuterol  - patient is on Anoro Ellipta at home as well, will let him know to bring it in

## 2019-10-24 LAB
ALBUMIN SERPL ELPH-MCNC: 2.5 G/DL — LOW (ref 3.3–5)
ALP SERPL-CCNC: 72 U/L — SIGNIFICANT CHANGE UP (ref 40–120)
ALT FLD-CCNC: 33 U/L — SIGNIFICANT CHANGE UP (ref 10–45)
ANION GAP SERPL CALC-SCNC: 17 MMOL/L — SIGNIFICANT CHANGE UP (ref 5–17)
AST SERPL-CCNC: 29 U/L — SIGNIFICANT CHANGE UP (ref 10–40)
BILIRUB SERPL-MCNC: 0.3 MG/DL — SIGNIFICANT CHANGE UP (ref 0.2–1.2)
BUN SERPL-MCNC: 12 MG/DL — SIGNIFICANT CHANGE UP (ref 7–23)
CALCIUM SERPL-MCNC: 8.3 MG/DL — LOW (ref 8.4–10.5)
CHLORIDE SERPL-SCNC: 99 MMOL/L — SIGNIFICANT CHANGE UP (ref 96–108)
CO2 SERPL-SCNC: 24 MMOL/L — SIGNIFICANT CHANGE UP (ref 22–31)
CREAT SERPL-MCNC: 0.91 MG/DL — SIGNIFICANT CHANGE UP (ref 0.5–1.3)
GLUCOSE SERPL-MCNC: 92 MG/DL — SIGNIFICANT CHANGE UP (ref 70–99)
HCT VFR BLD CALC: 30.7 % — LOW (ref 39–50)
HGB BLD-MCNC: 9.8 G/DL — LOW (ref 13–17)
MAGNESIUM SERPL-MCNC: 1.7 MG/DL — SIGNIFICANT CHANGE UP (ref 1.6–2.6)
MCHC RBC-ENTMCNC: 31 PG — SIGNIFICANT CHANGE UP (ref 27–34)
MCHC RBC-ENTMCNC: 31.9 GM/DL — LOW (ref 32–36)
MCV RBC AUTO: 97.2 FL — SIGNIFICANT CHANGE UP (ref 80–100)
PHOSPHATE SERPL-MCNC: 2.9 MG/DL — SIGNIFICANT CHANGE UP (ref 2.5–4.5)
PLATELET # BLD AUTO: 252 K/UL — SIGNIFICANT CHANGE UP (ref 150–400)
POTASSIUM SERPL-MCNC: 3.8 MMOL/L — SIGNIFICANT CHANGE UP (ref 3.5–5.3)
POTASSIUM SERPL-SCNC: 3.8 MMOL/L — SIGNIFICANT CHANGE UP (ref 3.5–5.3)
PROT SERPL-MCNC: 5.9 G/DL — LOW (ref 6–8.3)
RBC # BLD: 3.16 M/UL — LOW (ref 4.2–5.8)
RBC # FLD: 13.4 % — SIGNIFICANT CHANGE UP (ref 10.3–14.5)
SODIUM SERPL-SCNC: 140 MMOL/L — SIGNIFICANT CHANGE UP (ref 135–145)
VANCOMYCIN TROUGH SERPL-MCNC: 18.8 UG/ML — SIGNIFICANT CHANGE UP (ref 10–20)
WBC # BLD: 3.78 K/UL — LOW (ref 3.8–10.5)
WBC # FLD AUTO: 3.78 K/UL — LOW (ref 3.8–10.5)

## 2019-10-24 PROCEDURE — 99222 1ST HOSP IP/OBS MODERATE 55: CPT | Mod: GC

## 2019-10-24 PROCEDURE — 99233 SBSQ HOSP IP/OBS HIGH 50: CPT | Mod: GC

## 2019-10-24 RX ORDER — DIGOXIN 250 MCG
0.12 TABLET ORAL DAILY
Refills: 0 | Status: DISCONTINUED | OUTPATIENT
Start: 2019-10-24 | End: 2019-10-25

## 2019-10-24 RX ORDER — DIPHENHYDRAMINE HCL 50 MG
25 CAPSULE ORAL EVERY 6 HOURS
Refills: 0 | Status: DISCONTINUED | OUTPATIENT
Start: 2019-10-24 | End: 2019-10-24

## 2019-10-24 RX ORDER — DILTIAZEM HCL 120 MG
120 CAPSULE, EXT RELEASE 24 HR ORAL DAILY
Refills: 0 | Status: DISCONTINUED | OUTPATIENT
Start: 2019-10-24 | End: 2019-10-24

## 2019-10-24 RX ADMIN — Medication 1 APPLICATION(S): at 18:02

## 2019-10-24 RX ADMIN — Medication 30 MILLIGRAM(S): at 11:20

## 2019-10-24 RX ADMIN — ENOXAPARIN SODIUM 40 MILLIGRAM(S): 100 INJECTION SUBCUTANEOUS at 11:20

## 2019-10-24 RX ADMIN — UMECLIDINIUM BROMIDE AND VILANTEROL TRIFENATATE 1 PUFF(S): 62.5; 25 POWDER RESPIRATORY (INHALATION) at 11:21

## 2019-10-24 RX ADMIN — Medication 0.25 MILLIGRAM(S): at 05:42

## 2019-10-24 RX ADMIN — Medication 250 MILLIGRAM(S): at 18:01

## 2019-10-24 RX ADMIN — Medication 1 APPLICATION(S): at 05:43

## 2019-10-24 RX ADMIN — Medication 250 MILLIGRAM(S): at 05:43

## 2019-10-24 RX ADMIN — Medication 250 MILLIGRAM(S): at 05:42

## 2019-10-24 RX ADMIN — Medication 0.5 MILLIGRAM(S): at 22:46

## 2019-10-24 RX ADMIN — Medication 10 MILLIGRAM(S): at 22:45

## 2019-10-24 RX ADMIN — Medication 100 MILLIGRAM(S): at 21:31

## 2019-10-24 RX ADMIN — Medication 30 MILLIGRAM(S): at 05:42

## 2019-10-24 RX ADMIN — SENNA PLUS 2 TABLET(S): 8.6 TABLET ORAL at 21:31

## 2019-10-24 RX ADMIN — Medication 100 MILLIGRAM(S): at 09:29

## 2019-10-24 RX ADMIN — CEFTRIAXONE 100 MILLIGRAM(S): 500 INJECTION, POWDER, FOR SOLUTION INTRAMUSCULAR; INTRAVENOUS at 22:46

## 2019-10-24 RX ADMIN — Medication 1 MILLIGRAM(S): at 11:20

## 2019-10-24 NOTE — CONSULT NOTE ADULT - ATTENDING COMMENTS
68M with right foot swelling, redness with slight involvement of left foot  -clinical picture not c/w cellulitis   -stasis dermatitis vs component of lymphedema - ?related to fracture/cast use vs amlodipine/keytruda  -favor dc abx - risk >benefit   -no other constitutional evidence of infection like fever  -can see me in office PRN on DC    Jl Hutchinson  Attending Physician   Division of Infectious Disease  Pager #[phone]  After 5pm/weekend or no response, call #[phone]
Not sure if redness on feet represent infection, eg cellulitis or drug-reaction.  Please ask for Dermatology consult, and biopsy as indicated.

## 2019-10-24 NOTE — PROGRESS NOTE ADULT - SUBJECTIVE AND OBJECTIVE BOX
Contact Information:  Maria D Mo II, MD, MPH  PGY-1, Internal Medicine  Pager: [phone] ([facility]) /// [phone] ([facility])    VICK ALLEN, MRN-[id number]    Patient is a 68y old  Male who presents with a chief complaint of Cellulitis (23 Oct 2019 12:13)      OVERNIGHT EVENTS:    SUBJECTIVE:    CONSTITUTIONAL: No weakness. No fatigue. No fever.  HEAD: No head trauma.   EYES: No vision changes.  ENT: No hearing changes or tinnitus. No ear pain. No changes in smell. No nasal congestion or discharge. No sore throat. No voice hoarseness.   NECK: No neck pain or stiffness. No lumps.  RESPIRATORY: No cough. No SOB. No wheezing. No hemoptysis.   CARDIOVASCULAR: No chest pain. No palpitations.   GASTROINTESTINAL: No dysphagia. No ABD pain. No distension. No constipation. No diarrhea. No pain with defecation. No hematemesis. No hematochezia or melena.  BACK: No back pain.  GENITOURINARY: No dysuria. No frequency or urgency. No hesitancy. No incontinence. No urinary retention. No suprapubic pain. No hematuria.  EXTREMITY: No swelling.  MUSCULOSKELETAL: No joint pain or swelling. No fractures. No stiffness.    SKIN: No rashes. No itching. No skin, hair, or nail changes.  NEUROLOGICAL: No weakness or paralysis. No lightheadedness or dizziness. No HA. No numbness or tingling.   PSYCHIATRIC: No depression.       OBJECTIVE:  Vital Signs Last 24 Hrs  T(C): 36.4 (24 Oct 2019 04:00), Max: 36.7 (23 Oct 2019 12:03)  T(F): 97.6 (24 Oct 2019 04:00), Max: 98 (23 Oct 2019 12:03)  HR: 66 (24 Oct 2019 04:00) (66 - 121)  BP: 97/65 (24 Oct 2019 04:00) (92/60 - 100/64)  BP(mean): --  RR: 18 (24 Oct 2019 04:00) (18 - 18)  SpO2: 92% (24 Oct 2019 04:00) (92% - 96%)  I&O's Summary    22 Oct 2019 07:01  -  23 Oct 2019 07:00  --------------------------------------------------------  IN: 1080 mL / OUT: 200 mL / NET: 880 mL    23 Oct 2019 07:01  -  24 Oct 2019 06:49  --------------------------------------------------------  IN: 720 mL / OUT: 0 mL / NET: 720 mL        MEDICATIONS  (STANDING):  ALPRAZolam 0.5 milliGRAM(s) Oral at bedtime  atorvastatin 20 milliGRAM(s) Oral at bedtime  cefTRIAXone   IVPB 1000 milliGRAM(s) IV Intermittent every 24 hours  clobetasol 0.05% Ointment 1 Application(s) Topical two times a day  clotrimazole 1% Cream 1 Application(s) Topical every 12 hours  digoxin     Tablet 0.25 milliGRAM(s) Oral daily  diltiazem    Tablet 30 milliGRAM(s) Oral every 6 hours  enoxaparin Injectable 40 milliGRAM(s) SubCutaneous daily  folic acid 1 milliGRAM(s) Oral daily  melatonin 10 milliGRAM(s) Oral at bedtime  metoprolol succinate  milliGRAM(s) Oral two times a day  saccharomyces boulardii 250 milliGRAM(s) Oral two times a day  senna 2 Tablet(s) Oral at bedtime  umeclidinium 62.5 MICROgram(s)/vilanterol 25 MICROgram(s) Inhaler 1 Puff(s) Inhalation daily  vancomycin  IVPB 1000 milliGRAM(s) IV Intermittent every 12 hours    MEDICATIONS  (PRN):  ALBUTerol    90 MICROgram(s) HFA Inhaler 1 Puff(s) Inhalation four times a day PRN Shortness of Breath and/or Wheezing    Allergies    Keflex (Other (Severe))    Intolerances        CONSTITUTIONAL: No acute distress. Awake and alert.  HEAD: No evidence of trauma. Structures WNL.  EYES: +PERRL. +EOMI. No scleral icterus. No conjunctival injection.  ENT: Moist oral mucosa. No erythema. No pharyngeal exudates.   NECK: Supple. Appropriate ROM. No stiffness. No masses or lymphadenopathy.  RESPIRATORY: CTAB. No wheezes, rales, or rhonchi. No accessory muscle use. No apparent respiratory distress.  CARDIOVASCULAR: +S1/S2. No audible S3/S4. Regular rate and rhythm. No murmurs, rubs, or gallops. 2+ radial pulses x b/l UE; 2+ DP pulses x b/l LE.   GASTROINTESTINAL: Soft, nontender, nondistended. +BS. No rebound or guarding.   BACK: No spinal or paraspinal tenderness. No CVA tenderness.  EXTREMITY: No LE swelling or edema. EXTs warm to touch.  MUSCULOSKELETAL: Movement evident in all limbs. No tenderness on palpation.  DERMATOLOGICAL: No abnormal rashes or lesions.  NEUROLOGICAL: CN 2-12 grossly intact. No focal deficits. Sensation intact x 4EXT. A&Ox3 (oriented to person, place, and time).  PSYCHIATRIC: Appropriate affect.                            10.3   3.72  )-----------( 274      ( 23 Oct 2019 05:51 )             31.3       10-24    140  |  99  |  12  ----------------------------<  92  3.8   |  24  |  0.91    Ca    8.3<L>      24 Oct 2019 05:44  Phos  2.9     10-24  Mg     1.7     10-24    TPro  5.9<L>  /  Alb  2.5<L>  /  TBili  0.3  /  DBili  x   /  AST  29  /  ALT  33  /  AlkPhos  72  10-24    CAPILLARY BLOOD GLUCOSE        LIVER FUNCTIONS - ( 24 Oct 2019 05:44 )  Alb: 2.5 g/dL / Pro: 5.9 g/dL / ALK PHOS: 72 U/L / ALT: 33 U/L / AST: 29 U/L / GGT: x                   Culture - Blood (collected 21 Oct 2019 22:47)  Source: .Blood  Preliminary Report (22 Oct 2019 23:01):    No growth to date.    Culture - Blood (collected 21 Oct 2019 22:47)  Source: .Blood  Preliminary Report (22 Oct 2019 23:01):    No growth to date.          RADIOLOGY AND ADDITIONAL TESTS:    CONSULTANT NOTES REVIEWED:    CARE DISCUSSED WITH THE FOLLOWING CONSULTANTS/PROVIDERS: Contact Information:  Maria D Mo II, MD, MPH  PGY-1, Internal Medicine  Pager: [phone] ([facility]) /// [phone] ([facility])    VICK ALLEN, MRN-[id number]    Patient is a 68y old  Male who presents with a chief complaint of Cellulitis (23 Oct 2019 12:13)      OVERNIGHT EVENTS: Patient was slightly hypotensive, diltiazem continued.     SUBJECTIVE: Patient evaluated     CONSTITUTIONAL: No weakness. No fatigue. No fever.  HEAD: No head trauma.   EYES: No vision changes.  ENT: No hearing changes or tinnitus. No ear pain. No changes in smell. No nasal congestion or discharge. No sore throat. No voice hoarseness.   NECK: No neck pain or stiffness. No lumps.  RESPIRATORY: No cough. No SOB. No wheezing. No hemoptysis.   CARDIOVASCULAR: No chest pain. No palpitations.   GASTROINTESTINAL: No dysphagia. No ABD pain. No distension. No constipation. No diarrhea. No pain with defecation. No hematemesis. No hematochezia or melena.  BACK: No back pain.  GENITOURINARY: No dysuria. No frequency or urgency. No hesitancy. No incontinence. No urinary retention. No suprapubic pain. No hematuria.  EXTREMITY: No swelling.  MUSCULOSKELETAL: No joint pain or swelling. No fractures. No stiffness.    SKIN: No rashes. No itching. No skin, hair, or nail changes.  NEUROLOGICAL: No weakness or paralysis. No lightheadedness or dizziness. No HA. No numbness or tingling.   PSYCHIATRIC: No depression.       OBJECTIVE:  Vital Signs Last 24 Hrs  T(C): 36.4 (24 Oct 2019 04:00), Max: 36.7 (23 Oct 2019 12:03)  T(F): 97.6 (24 Oct 2019 04:00), Max: 98 (23 Oct 2019 12:03)  HR: 66 (24 Oct 2019 04:00) (66 - 121)  BP: 97/65 (24 Oct 2019 04:00) (92/60 - 100/64)  BP(mean): --  RR: 18 (24 Oct 2019 04:00) (18 - 18)  SpO2: 92% (24 Oct 2019 04:00) (92% - 96%)  I&O's Summary    22 Oct 2019 07:01  -  23 Oct 2019 07:00  --------------------------------------------------------  IN: 1080 mL / OUT: 200 mL / NET: 880 mL    23 Oct 2019 07:01  -  24 Oct 2019 06:49  --------------------------------------------------------  IN: 720 mL / OUT: 0 mL / NET: 720 mL        MEDICATIONS  (STANDING):  ALPRAZolam 0.5 milliGRAM(s) Oral at bedtime  atorvastatin 20 milliGRAM(s) Oral at bedtime  cefTRIAXone   IVPB 1000 milliGRAM(s) IV Intermittent every 24 hours  clobetasol 0.05% Ointment 1 Application(s) Topical two times a day  clotrimazole 1% Cream 1 Application(s) Topical every 12 hours  digoxin     Tablet 0.25 milliGRAM(s) Oral daily  diltiazem    Tablet 30 milliGRAM(s) Oral every 6 hours  enoxaparin Injectable 40 milliGRAM(s) SubCutaneous daily  folic acid 1 milliGRAM(s) Oral daily  melatonin 10 milliGRAM(s) Oral at bedtime  metoprolol succinate  milliGRAM(s) Oral two times a day  saccharomyces boulardii 250 milliGRAM(s) Oral two times a day  senna 2 Tablet(s) Oral at bedtime  umeclidinium 62.5 MICROgram(s)/vilanterol 25 MICROgram(s) Inhaler 1 Puff(s) Inhalation daily  vancomycin  IVPB 1000 milliGRAM(s) IV Intermittent every 12 hours    MEDICATIONS  (PRN):  ALBUTerol    90 MICROgram(s) HFA Inhaler 1 Puff(s) Inhalation four times a day PRN Shortness of Breath and/or Wheezing    Allergies    Keflex (Other (Severe))    Intolerances        CONSTITUTIONAL: No acute distress. Awake and alert.  HEAD: No evidence of trauma. Structures WNL.  EYES: +PERRL. +EOMI. No scleral icterus. No conjunctival injection.  ENT: Moist oral mucosa. No erythema. No pharyngeal exudates.   NECK: Supple. Appropriate ROM. No stiffness. No masses or lymphadenopathy.  RESPIRATORY: CTAB. No wheezes, rales, or rhonchi. No accessory muscle use. No apparent respiratory distress.  CARDIOVASCULAR: +S1/S2. No audible S3/S4. Regular rate and rhythm. No murmurs, rubs, or gallops. 2+ radial pulses x b/l UE; 2+ DP pulses x b/l LE.   GASTROINTESTINAL: Soft, nontender, nondistended. +BS. No rebound or guarding.   BACK: No spinal or paraspinal tenderness. No CVA tenderness.  EXTREMITY: No LE swelling or edema. EXTs warm to touch.  MUSCULOSKELETAL: Movement evident in all limbs. No tenderness on palpation.  DERMATOLOGICAL: No abnormal rashes or lesions.  NEUROLOGICAL: CN 2-12 grossly intact. No focal deficits. Sensation intact x 4EXT. A&Ox3 (oriented to person, place, and time).  PSYCHIATRIC: Appropriate affect.                            10.3   3.72  )-----------( 274      ( 23 Oct 2019 05:51 )             31.3       10-24    140  |  99  |  12  ----------------------------<  92  3.8   |  24  |  0.91    Ca    8.3<L>      24 Oct 2019 05:44  Phos  2.9     10-24  Mg     1.7     10-24    TPro  5.9<L>  /  Alb  2.5<L>  /  TBili  0.3  /  DBili  x   /  AST  29  /  ALT  33  /  AlkPhos  72  10-24    CAPILLARY BLOOD GLUCOSE        LIVER FUNCTIONS - ( 24 Oct 2019 05:44 )  Alb: 2.5 g/dL / Pro: 5.9 g/dL / ALK PHOS: 72 U/L / ALT: 33 U/L / AST: 29 U/L / GGT: x                   Culture - Blood (collected 21 Oct 2019 22:47)  Source: .Blood  Preliminary Report (22 Oct 2019 23:01):    No growth to date.    Culture - Blood (collected 21 Oct 2019 22:47)  Source: .Blood  Preliminary Report (22 Oct 2019 23:01):    No growth to date.          RADIOLOGY AND ADDITIONAL TESTS:    CONSULTANT NOTES REVIEWED:    CARE DISCUSSED WITH THE FOLLOWING CONSULTANTS/PROVIDERS: Contact Information:  Maria D Mo II, MD, MPH  PGY-1, Internal Medicine  Pager: [phone] ([facility]) /// [phone] ([facility])    VICK ALLEN, MRN-[id number]    Patient is a 68y old  Male who presents with a chief complaint of Cellulitis (23 Oct 2019 12:13)      OVERNIGHT EVENTS: Patient was slightly hypotensive, diltiazem continued.     SUBJECTIVE: Patient evaluated at bedside. Increased pain in legs.    CONSTITUTIONAL: No weakness. No fatigue. No fever.  HEAD: No head trauma.   EYES: No vision changes.  ENT: No hearing changes or tinnitus. No ear pain. No changes in smell. No nasal congestion or discharge. No sore throat. No voice hoarseness.   NECK: No neck pain or stiffness. No lumps.  RESPIRATORY: No cough. No SOB. No wheezing. No hemoptysis.   CARDIOVASCULAR: No chest pain. No palpitations.   GASTROINTESTINAL: No dysphagia. No ABD pain. No distension. No constipation. No diarrhea. No pain with defecation. No hematemesis. No hematochezia or melena.  BACK: No back pain.  GENITOURINARY: No dysuria. No frequency or urgency. No hesitancy. No incontinence. No urinary retention. No suprapubic pain. No hematuria.  EXTREMITY: No swelling.  MUSCULOSKELETAL: +Leg pain.  SKIN: No rashes. No itching. No skin, hair, or nail changes.  NEUROLOGICAL: No weakness or paralysis. No lightheadedness or dizziness. No HA. No numbness or tingling.   PSYCHIATRIC: No depression.       OBJECTIVE:  Vital Signs Last 24 Hrs  T(C): 36.4 (24 Oct 2019 04:00), Max: 36.7 (23 Oct 2019 12:03)  T(F): 97.6 (24 Oct 2019 04:00), Max: 98 (23 Oct 2019 12:03)  HR: 66 (24 Oct 2019 04:00) (66 - 121)  BP: 97/65 (24 Oct 2019 04:00) (92/60 - 100/64)  BP(mean): --  RR: 18 (24 Oct 2019 04:00) (18 - 18)  SpO2: 92% (24 Oct 2019 04:00) (92% - 96%)  I&O's Summary    22 Oct 2019 07:01  -  23 Oct 2019 07:00  --------------------------------------------------------  IN: 1080 mL / OUT: 200 mL / NET: 880 mL    23 Oct 2019 07:01  -  24 Oct 2019 06:49  --------------------------------------------------------  IN: 720 mL / OUT: 0 mL / NET: 720 mL        MEDICATIONS  (STANDING):  ALPRAZolam 0.5 milliGRAM(s) Oral at bedtime  atorvastatin 20 milliGRAM(s) Oral at bedtime  cefTRIAXone   IVPB 1000 milliGRAM(s) IV Intermittent every 24 hours  clobetasol 0.05% Ointment 1 Application(s) Topical two times a day  clotrimazole 1% Cream 1 Application(s) Topical every 12 hours  digoxin     Tablet 0.25 milliGRAM(s) Oral daily  diltiazem    Tablet 30 milliGRAM(s) Oral every 6 hours  enoxaparin Injectable 40 milliGRAM(s) SubCutaneous daily  folic acid 1 milliGRAM(s) Oral daily  melatonin 10 milliGRAM(s) Oral at bedtime  metoprolol succinate  milliGRAM(s) Oral two times a day  saccharomyces boulardii 250 milliGRAM(s) Oral two times a day  senna 2 Tablet(s) Oral at bedtime  umeclidinium 62.5 MICROgram(s)/vilanterol 25 MICROgram(s) Inhaler 1 Puff(s) Inhalation daily  vancomycin  IVPB 1000 milliGRAM(s) IV Intermittent every 12 hours    MEDICATIONS  (PRN):  ALBUTerol    90 MICROgram(s) HFA Inhaler 1 Puff(s) Inhalation four times a day PRN Shortness of Breath and/or Wheezing    Allergies    Keflex (Other (Severe))    Intolerances        CONSTITUTIONAL: No acute distress. Awake and alert.  EYES: +EOMI. No scleral icterus. No conjunctival injection.  ENT: Moist oral mucosa. No erythema. No pharyngeal exudates.   NECK: Supple.  RESPIRATORY: CTAB. No wheezes, rales, or rhonchi. No accessory muscle use. No apparent respiratory distress.  CARDIOVASCULAR: +S1/S2. Tachycardic and irregular. No murmurs, rubs, or gallops. LE pulses attenuated due to LE edema.  GASTROINTESTINAL: Soft, nontender, nondistended. +BS. No rebound or guarding.   EXTREMITY: B/l dorsal foot swelling; dorsal erythema on R foot to ankle, dorsal erythema on L foot proximal to metatarsals  MUSCULOSKELETAL: Movement evident in all limbs. No tenderness on palpation.  DERMATOLOGICAL: Erythema on dorsal aspect of both feet.  NEUROLOGICAL: CN 2-12 grossly intact. No focal deficits. Sensation intact x 4EXT. A&Ox3 (oriented to person, place, and time).  PSYCHIATRIC: Appropriate affect.                            10.3   3.72  )-----------( 274      ( 23 Oct 2019 05:51 )             31.3       10-24    140  |  99  |  12  ----------------------------<  92  3.8   |  24  |  0.91    Ca    8.3<L>      24 Oct 2019 05:44  Phos  2.9     10-24  Mg     1.7     10-24    TPro  5.9<L>  /  Alb  2.5<L>  /  TBili  0.3  /  DBili  x   /  AST  29  /  ALT  33  /  AlkPhos  72  10-24    CAPILLARY BLOOD GLUCOSE        LIVER FUNCTIONS - ( 24 Oct 2019 05:44 )  Alb: 2.5 g/dL / Pro: 5.9 g/dL / ALK PHOS: 72 U/L / ALT: 33 U/L / AST: 29 U/L / GGT: x                   Culture - Blood (collected 21 Oct 2019 22:47)  Source: .Blood  Preliminary Report (22 Oct 2019 23:01):    No growth to date.    Culture - Blood (collected 21 Oct 2019 22:47)  Source: .Blood  Preliminary Report (22 Oct 2019 23:01):    No growth to date.          RADIOLOGY AND ADDITIONAL TESTS:    CONSULTANT NOTES REVIEWED:    CARE DISCUSSED WITH THE FOLLOWING CONSULTANTS/PROVIDERS:

## 2019-10-24 NOTE — PROGRESS NOTE ADULT - ASSESSMENT
Spoke to pharmacy - awaiting Rocephin dose.    68 year old male with COPD HTN, HLD, adenocarcinoma of the lung (on Keytruda and Olympta) at [facility] presents after failed outpatient treatment of cellulitis of the right foot, admitted for IV antibiotic treatment.

## 2019-10-24 NOTE — CONSULT NOTE ADULT - SUBJECTIVE AND OBJECTIVE BOX
Alvarado Winter, PGY1  Internal Medicine, Infectious Diseases Service  Pager: [phone] ([facility]), [phone] ([facility])    Patient is a 68y old  Male who presents with a chief complaint of Cellulitis (24 Oct 2019 09:23)    HPI:  68 year old male with COPD HTN, HLD, adenocarcinoma of the lung (on Keytruda and Alimta) at [facility] presents after failed outpatient treatment of cellulitis of the right foot. Two months ago the patient broke his 4th toe and metatarsal on the right and developed cellulitis The patient reports that his oncologist thought that the combination of a possible break in the dry skin of his foot combined with his lower extremity swelling secondary to amlodipine may have caused this. His podiatrist prescribed Augmentin 500 mg BID for 7 days. He completed the course and saw an improvement in his foot, but the erythema soon returned. His Oncologist soon prescribed Keflex 500 mg TID. The patient took two doses and stopped the medication because it caused severe abdominal pain. This reaction is one he had many years ago with this medication. Last Saturday, the patient noticed that the erythema have moved up his ankle and erythema started to appear on the dorsum of the left foot. He called his Oncologist who prescribed Augmentin 500 mg TID. He tried the medication until Monday and noticed no improvement. His oncologist instructed him to go to the ED for IV antibiotics. His temperature is usually 97.5F, but last night his temperature elevated to 98.3F. He took two Tylenol tablets and this morning his temperature returned to normal. He denies chills, sick contacts, chest pain, cough, shortness of breath, nausea, vomiting or diarrhea.    Of note, the patient was diagnosed approximately one year ago with lung cancer. The patient was originally treated with four rounds of Keytruda, Alimta, and carboplatin. The carboplatin was stopped and he continues to be treated with the Keytruda and Alimta. He will be starting radiation therapy soon. He will also be partaking in a drug trial at [facility]. His current chemotherapy regimen causes constipation for which he takes senna and colace.    In the ED, vital signs were Tmax 98.7F, HR  bpm, -111/66-78 mm Hg, RR 19-20, 97-98% RA.  He received 1g vancomycin X1, lactobacillus acidophilus. 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  Irritable bowel syndrome  Nerve damage: posterior cervical region of neck  Inguinal hernia  Metastatic disease  Adenocarcinoma of lung  Spondylolisthesis  HLD (hyperlipidemia)  HTN (hypertension)  COPD (chronic obstructive pulmonary disease)  History of arthroscopic knee surgery    Allergies  Keflex (Other (Severe))    ANTIMICROBIALS (past 90 days)  MEDICATIONS  (STANDING):  cefTRIAXone   IVPB   100 mL/Hr IV Intermittent (10-21-19 @ 23:02)    cefTRIAXone   IVPB   100 mL/Hr IV Intermittent (10-23-19 @ 23:16)   100 mL/Hr IV Intermittent (10-22-19 @ 23:35)    vancomycin  IVPB   250 mL/Hr IV Intermittent (10-24-19 @ 05:43)   250 mL/Hr IV Intermittent (10-23-19 @ 18:42)   250 mL/Hr IV Intermittent (10-23-19 @ 06:36)   250 mL/Hr IV Intermittent (10-22-19 @ 17:33)   250 mL/Hr IV Intermittent (10-22-19 @ 06:00)    vancomycin  IVPB   250 mL/Hr IV Intermittent (10-21-19 @ 18:22)      ANTIMICROBIALS:    cefTRIAXone   IVPB 1000 every 24 hours    OTHER MEDS: MEDICATIONS  (STANDING):  ALBUTerol    90 MICROgram(s) HFA Inhaler 1 four times a day PRN  ALPRAZolam 0.5 at bedtime  atorvastatin 20 at bedtime  digoxin     Tablet 0.25 daily  enoxaparin Injectable 40 daily  melatonin 10 at bedtime  metoprolol succinate  two times a day  senna 2 at bedtime  umeclidinium 62.5 MICROgram(s)/vilanterol 25 MICROgram(s) Inhaler 1 daily    SOCIAL HISTORY:   hx smoking  non-smoker    FAMILY HISTORY:  No pertinent family history in first degree relatives    REVIEW OF SYSTEMS  [X] All other systems negative except as noted below:	    Constitutional:  [ ] fever [ ] chills  [X] weight loss 10 lbs/1 (?) month  [ ] weakness  Skin:  [X] rash [ ] phlebitis	  Eyes: [ ] icterus [ ] pain  [ ] discharge	  ENMT: [ ] sore throat  [ ] thrush [ ] ulcers [ ] exudates  Respiratory: [X] dyspnea - occasional [ ] hemoptysis [ ] cough [ ] sputum	  Cardiovascular:  [ ] chest pain [X] palpitations new-onset A Fib [ ] edema	  Gastrointestinal:  [ ] nausea [ ] vomiting [ ] diarrhea [X] constipation [ ] pain	  Genitourinary:  [ ] dysuria [ ] frequency [ ] hematuria [ ] discharge [ ] flank pain  [ ] incontinence  Musculoskeletal:  [ ] myalgias [ ] arthralgias [ ] arthritis  [ ] back pain  Neurological:  [ ] headache [ ] seizures  [ ] confusion/altered mental status  Psychiatric:  [ ] anxiety [ ] depression	  Hematology/Lymphatics:  [ ] lymphadenopathy  Endocrine:  [ ] adrenal [ ] thyroid  Allergic/Immunologic:	 [ ] transplant [ ] seasonal    Vital Signs Last 24 Hrs  T(F): 97.6 (10-24-19 @ 14:13), Max: 99.2 (10-21-19 @ 20:08)  Vital Signs Last 24 Hrs  HR: 67 (10-24-19 @ 11:25) (66 - 87)  BP: 91/62 (10-24-19 @ 11:25) (91/62 - 100/64)  RR: 18 (10-24-19 @ 11:25)  SpO2: 96% (10-24-19 @ 11:25) (92% - 96%)    PHYSICAL EXAM:  General: Awake. Non-toxic. Comfortable, NAD. Very conversive.  HEAD/EYES: anicteric, PERRLA, EOMI  ENT:  supple, no thrush  Cardiovascular:   RRR, S1, S2, no MRG  Respiratory:  CTAB  GI:  soft, non-tender, normal bowel sounds  :  no CVA tenderness   Musculoskeletal:  no synovitis  Neurologic:  grossly non-focal  Skin:  Dorsum of R foot: lightly erythematous/pink, slight pain on deep palpation. Poorly demarcated, not indurated or warm to touch. 1+ pitting edema (R>L) up to mid-shins BL. Dorsum of L foot: very faint pink on the lateral side of the foot, nontender.  Lymph: no lymphadenopathy  Psychiatric:  appropriate affect  Vascular:  no phlebitis               9.8    3.78  )-----------( 252      ( 24 Oct 2019 08:59 )             30.7     10-24    140  |  99  |  12  ----------------------------<  92  3.8   |  24  |  0.91    Ca    8.3<L>      24 Oct 2019 05:44  Phos  2.9     10-24  Mg     1.7     10-24    TPro  5.9<L>  /  Alb  2.5<L>  /  TBili  0.3  /  DBili  x   /  AST  29  /  ALT  33  /  AlkPhos  72  10-24    MICROBIOLOGY: Vancomycin Level, Trough: 18.8 (10-24 @ 05:44)    Culture - Blood (collected 21 Oct 2019 22:47)  Source: .Blood  Preliminary Report (22 Oct 2019 23:01):    No growth to date.    Culture - Blood (collected 21 Oct 2019 22:47)  Source: .Blood  Preliminary Report (22 Oct 2019 23:01):    No growth to date.    RADIOLOGY:  < from: Xray Foot AP + Lateral + Oblique, Right (10.23.19 @ 10:57) >  EXAM:  FOOT COMPLETE RIGHT (MIN 3 VIEW)  PROCEDURE DATE:  10/23/2019    IMPRESSION:   There is diffuse soft tissue swelling in the foot and ankle. No acute   displaced fracture or dislocation is seen. Apparent irregularity of the   fourth toe proximal phalanx may be projectional. No acute cortical   erosive changes are seen. Mild arthritic changes at the first   metatarsophalangeal joint. Hallux valgus.  < end of copied text >    < from: [facility] Duplex Lower Ext Vein Scan, Bilat (10.22.19 @ 15:54) >  EXAM:  DUPLEX SCAN EXT VEINS LOWER BI  PROCEDURE DATE:  10/22/2019  IMPRESSION: No evidence of deep venous thrombosis in either lower extremity  < end of copied text >

## 2019-10-24 NOTE — CONSULT NOTE ADULT - ASSESSMENT
68 year old male with COPD, HTN, HLD, metastatic lung adenocarcinoma (Dx 1 year, on Keytruda and Alimta) was admitted 10/21 after failed outpatient treatment of cellulitis of the right foot. Two months ago, he broke his right 4th toe and metatarsal, and subsequently developed redness and swelling. His podiatrist diagnosed him with cellulitis and he completed a 7 day course of Augmentin 500 mg BID, seeing an initial improvement, however the erythema soon returned. His Oncologist prescribed Keflex 500 mg TID, however he could not tolerate it due to severe abdominal pain. Last Saturday, the erythema advanced up his right ankle and he also noticed erythema appearing on the dorsum of the left foot. Repeat trial of Augmentin did not show improvement, after which he went to the ED. He was given 1g vancomycin x1 and ceftriaxone 1g daily; ID was consulted for evaluation of cellulitis.    Assessment/Plan:  - Remained afebrile with no leukocytosis during hospital course  - On exam, does not appear to have signs/symptoms of cellulitis  - More likely to be chronic edema 2/2 amlodipine and/or boot following fracture  - Recommend to discontinue antibiotics, cleared for discharge  - May follow with Dr. Hutchinson outpatient should symptoms worsen after discharge

## 2019-10-24 NOTE — PROGRESS NOTE ADULT - PROBLEM SELECTOR PLAN 1
- Abrupt onset of Afib/Flutter likely 2/2 indeterminate cause  - Metoprolol 100 BID, digoxin load 1g for today, diltiazem 30 mg Q6H  - Cardiology following - Abrupt onset of Afib/Flutter likely 2/2 indeterminate cause  - Metoprolol 100 BID, digoxin load 1g for today, diltiazem switched to 120  - Cardiology following

## 2019-10-24 NOTE — PROGRESS NOTE ADULT - PROBLEM SELECTOR PLAN 3
- Diagnosed 1 year ago  - Follows oncologist is Dr. Milka Rodgers at [facility]. - S/p 4 rounds of Keytruda, Olympta, and carboplatin; now on Keytruda and Olympta, to start radiation, drug trial at [facility].   - Per Oncology, no inpatient treatment, to follow-up with oncologist outpatient

## 2019-10-24 NOTE — PROGRESS NOTE ADULT - SUBJECTIVE AND OBJECTIVE BOX
Interval History: Denies any chest pain, palpitations, dyspnea overnight.     Review Of Systems:  Constitutional: [ ] Fever [ ] Chills [ ] Fatigue [ ] Weight change   HEENT: [ ] Blurred vision [ ] Eye Pain [ ] Headache [ ] Runny nose [ ] Sore Throat   Respiratory: [ ] Cough [ ] Wheezing [ ] Shortness of breath  Cardiovascular: [ ] Chest Pain [ ] Palpitations [ ] HEALY [ ] PND [ ] Orthopnea  Gastrointestinal: [ ] Abdominal Pain [ ] Diarrhea [ ] Constipation [ ] Hemorrhoids [ ] Nausea [ ] Vomiting  Genitourinary: [ ] Nocturia [ ] Dysuria [ ] Incontinence  Extremities: [ ] Swelling [ ] Joint Pain  Neurologic: [ ] Focal deficit [ ] Paresthesias [ ] Syncope  Lymphatic: [ ] Swelling [ ] Lymphadenopathy   Skin: [ ] Rash [ ] Ecchymoses [ ] Wounds [ ] Lesions  Psychiatry: [ ] Depression [ ] Suicidal/Homicidal Ideation [ ] Anxiety [ ] Sleep Disturbances  [ ] 10 point review of systems is otherwise negative except as mentioned above            [ ]Unable to obtain    Medications:  ALBUTerol    90 MICROgram(s) HFA Inhaler 1 Puff(s) Inhalation four times a day PRN  ALPRAZolam 0.5 milliGRAM(s) Oral at bedtime  atorvastatin 20 milliGRAM(s) Oral at bedtime  cefTRIAXone   IVPB 1000 milliGRAM(s) IV Intermittent every 24 hours  clobetasol 0.05% Ointment 1 Application(s) Topical two times a day  clotrimazole 1% Cream 1 Application(s) Topical every 12 hours  digoxin     Tablet 0.25 milliGRAM(s) Oral daily  diltiazem    Tablet 30 milliGRAM(s) Oral every 6 hours  enoxaparin Injectable 40 milliGRAM(s) SubCutaneous daily  folic acid 1 milliGRAM(s) Oral daily  melatonin 10 milliGRAM(s) Oral at bedtime  metoprolol succinate  milliGRAM(s) Oral two times a day  saccharomyces boulardii 250 milliGRAM(s) Oral two times a day  senna 2 Tablet(s) Oral at bedtime  umeclidinium 62.5 MICROgram(s)/vilanterol 25 MICROgram(s) Inhaler 1 Puff(s) Inhalation daily  vancomycin  IVPB 1000 milliGRAM(s) IV Intermittent every 12 hours      Vitals:  ICU Vital Signs Last 24 Hrs  T(C): 36.4 (24 Oct 2019 04:00), Max: 36.7 (23 Oct 2019 12:03)  T(F): 97.6 (24 Oct 2019 04:00), Max: 98 (23 Oct 2019 12:03)  HR: 66 (24 Oct 2019 04:00) (66 - 94)  BP: 97/65 (24 Oct 2019 04:00) (92/60 - 100/64)  BP(mean): --  ABP: --  ABP(mean): --  RR: 18 (24 Oct 2019 04:00) (18 - 18)  SpO2: 92% (24 Oct 2019 04:00) (92% - 96%)    Daily     Daily Weight in k.2 (23 Oct 2019 12:03)  I&O's Summary    23 Oct 2019 07:01  -  24 Oct 2019 07:00  --------------------------------------------------------  IN: 720 mL / OUT: 0 mL / NET: 720 mL        Physical Exam:  Appearance:  NAD [  ] Intubated [  ] Sedated  HENT: NC/AT  Cardiovascular: S1, S2, [  ] LE Edema Present, [  ] JVD Present  Respiratory: Clear to auscultation bilaterally,  [   ] Transmitted Breath Sounds From Vent/Trach  Gastrointestinal: Soft, Non-tender, Non-distended, BS+  Psychiatry: [  ] AAOx3  [   ] Follows Commands  [   ] Unable to assess  Skin: Intact,  [  ] Line Sites CDI, [  ] Wound sites CDI    Labs:                        9.8    3.78  )-----------( 252      ( 24 Oct 2019 08:59 )             30.7     10-24    140  |  99  |  12  ----------------------------<  92  3.8   |  24  |  0.91    Ca    8.3<L>      24 Oct 2019 05:44  Phos  2.9     10-24  Mg     1.7     10-24    TPro  5.9<L>  /  Alb  2.5<L>  /  TBili  0.3  /  DBili  x   /  AST  29  /  ALT  33  /  AlkPhos  72  10-24                    Culture - Blood (collected 10-21-19 @ 22:47)  Source: .Blood  Preliminary Report (10-22-19 @ 23:01):    No growth to date.    Culture - Blood (collected 10-21-19 @ 22:47)  Source: .Blood  Preliminary Report (10-22-19 @ 23:01):    No growth to date.        ECG:    Echo:    Stress Testing:     Cath:    Imaging:    Interpretation of Telemetry: Interval History: Remains in slow a. fib, HR 60-70s, patient asymptomatic, no chest pain, palpitations, dyspnea. Previously declining anticoagulation, reconsidering at this moment.    Review Of Systems:  Constitutional: [ ] Fever [ ] Chills [ ] Fatigue [ ] Weight change   HEENT: [ ] Blurred vision [ ] Eye Pain [ ] Headache [ ] Runny nose [ ] Sore Throat   Respiratory: [ ] Cough [ ] Wheezing [ ] Shortness of breath  Cardiovascular: [ ] Chest Pain [ ] Palpitations [ ] HEALY [ ] PND [ ] Orthopnea  Gastrointestinal: [ ] Abdominal Pain [ ] Diarrhea [ ] Constipation [ ] Hemorrhoids [ ] Nausea [ ] Vomiting  Genitourinary: [ ] Nocturia [ ] Dysuria [ ] Incontinence  Extremities: [ ] Swelling [ ] Joint Pain  Neurologic: [ ] Focal deficit [ ] Paresthesias [ ] Syncope  Lymphatic: [ ] Swelling [ ] Lymphadenopathy   Skin: [ ] Rash [ ] Ecchymoses [ ] Wounds [ ] Lesions  Psychiatry: [ ] Depression [ ] Suicidal/Homicidal Ideation [ ] Anxiety [ ] Sleep Disturbances  [x] 10 point review of systems is otherwise negative except as mentioned above    Medications:  ALBUTerol    90 MICROgram(s) HFA Inhaler 1 Puff(s) Inhalation four times a day PRN  ALPRAZolam 0.5 milliGRAM(s) Oral at bedtime  atorvastatin 20 milliGRAM(s) Oral at bedtime  cefTRIAXone   IVPB 1000 milliGRAM(s) IV Intermittent every 24 hours  clobetasol 0.05% Ointment 1 Application(s) Topical two times a day  clotrimazole 1% Cream 1 Application(s) Topical every 12 hours  digoxin     Tablet 0.25 milliGRAM(s) Oral daily  diltiazem    Tablet 30 milliGRAM(s) Oral every 6 hours  enoxaparin Injectable 40 milliGRAM(s) SubCutaneous daily  folic acid 1 milliGRAM(s) Oral daily  melatonin 10 milliGRAM(s) Oral at bedtime  metoprolol succinate  milliGRAM(s) Oral two times a day  saccharomyces boulardii 250 milliGRAM(s) Oral two times a day  senna 2 Tablet(s) Oral at bedtime  umeclidinium 62.5 MICROgram(s)/vilanterol 25 MICROgram(s) Inhaler 1 Puff(s) Inhalation daily  vancomycin  IVPB 1000 milliGRAM(s) IV Intermittent every 12 hours      Vitals:  ICU Vital Signs Last 24 Hrs  T(C): 36.4 (24 Oct 2019 04:00), Max: 36.7 (23 Oct 2019 12:03)  T(F): 97.6 (24 Oct 2019 04:00), Max: 98 (23 Oct 2019 12:03)  HR: 66 (24 Oct 2019 04:00) (66 - 94)  BP: 97/65 (24 Oct 2019 04:00) (92/60 - 100/64)  BP(mean): --  ABP: --  ABP(mean): --  RR: 18 (24 Oct 2019 04:00) (18 - 18)  SpO2: 92% (24 Oct 2019 04:00) (92% - 96%)    Daily     Daily Weight in k.2 (23 Oct 2019 12:03)  I&O's Summary    23 Oct 2019 07:01  -  24 Oct 2019 07:00  --------------------------------------------------------  IN: 720 mL / OUT: 0 mL / NET: 720 mL        Physical Exam:  Appearance:  NAD, no increased work of breathing  HENT: NC/AT  Cardiovascular: irregularly irregular, no murmurs appreciated  Respiratory: Clear to auscultation bilaterally with decreased air entry throughout, no wheezing or crackles  Gastrointestinal: Soft, Non-tender  Psychiatry: [x] AAOx3  [x] Follows Commands  Skin: Intact, bilateral LE edema with erythematous patch over feet    Labs:                        9.8    3.78  )-----------( 252      ( 24 Oct 2019 08:59 )             30.7     10-24    140  |  99  |  12  ----------------------------<  92  3.8   |  24  |  0.91    Ca    8.3<L>      24 Oct 2019 05:44  Phos  2.9     10-24  Mg     1.7     10-24    TPro  5.9<L>  /  Alb  2.5<L>  /  TBili  0.3  /  DBili  x   /  AST  29  /  ALT  33  /  AlkPhos  72  10-24    Culture - Blood (collected 10-21-19 @ 22:47)  Source: .Blood  Preliminary Report (10-22-19 @ 23:01):    No growth to date.    Culture - Blood (collected 10-21-19 @ 22:47)  Source: .Blood  Preliminary Report (10-22-19 @ 23:01):    No growth to date.    ECG: a. fib/flutter with RVR, PVCs    Echo:  < from: Transthoracic Echocardiogram (10.23.19 @ 17:21) >  Conclusions:  1. Normal mitral valve. Mild mitral regurgitation.  2. Normal left ventricular internal dimensions and wall  thicknesses.  3. Normal left ventricular systolic function. No segmental  wall motion abnormalities.  4. Normal right ventricular size and systolic function.  5. Estimated right ventricular systolic pressure equals 24  mm Hg, assuming right atrial pressure equals 8 mm Hg,  consistent with normal pulmonary pressures.  6. Normal pericardium with trace to small circumferential  pericardial effusion (largest measurement 0.8 cm inferior  to the left ventricle in the subcostal view). No  echocardiographic evidence of tamponade is seen (No loss of  IVC variation with respiration, no respiratory variation of  flow >25% across the mitral valve, no late diastolic RA or  early diastolic RV diastolic collapse)    < end of copied text >    Interpretation of Telemetry: a. fib, rate 60-70s

## 2019-10-24 NOTE — PROGRESS NOTE ADULT - ASSESSMENT
68M with COPD, HTN, HLD, adenocarcinoma of the lung (on Keytruda and Olympta) at [facility] initially presenting with cellulitis, found to be in atrial flutter/fib with RVR requiring aggressive rate control and digoxin load, now rate controlled with paroxysmal a. fib.    - Continue to monitor on telemetry   - Continue metoprolol 100 ER BID, diltiazem 30 q6h PRN with hold parameters for HR <60, BP <90/60, can transition to diltiazem ER if tolerates well  - Continue digoxin 0.25 mg oral daily   - CHADVASc score 2, recommend starting Eliquis 5mg BID for anticoagulation for a. fib, patient currently deferring until discussion with oncologist  - Patient may be not be a good ablation candidate if he has concurrent a flutter and fibrillation    Incomplete note, to be discussed with attending.    Gale Colvin MD  Cardiology Fellow  [phone]  All Cardiology service information can be found 24/7 on [email or web address], password: [email or web address] 68M with COPD, HTN, HLD, adenocarcinoma of the lung (on Keytruda and Olympta) at [facility] initially presenting with cellulitis, found to be in atrial flutter/fib with RVR requiring aggressive rate control and digoxin load, now rate controlled with paroxysmal a. fib.    #a. fib/flutter with RVR  - Continue to monitor on telemetry   - Continue metoprolol 100 ER BID, diltiazem 30 q6h PRN with hold parameters for HR <60, BP <90/60, can transition to diltiazem ER if tolerates well  - Continue digoxin 0.25 mg oral daily   - CHADVASc score 2, recommend starting Eliquis 5mg BID for anticoagulation for a. fib, patient currently reconsidering decision  - Patient may not be a good ablation candidate with concurrent a flutter and fibrillation    Incomplete note, to be discussed with attending.    Gale Colvin MD  Cardiology Fellow  [phone]  All Cardiology service information can be found 24/7 on [email or web address], password: [employer] 68M with COPD, HTN, HLD, adenocarcinoma of the lung (on Keytruda and Olympta) at [facility] initially presenting with cellulitis, found to be in atrial flutter/fib with RVR requiring aggressive rate control and digoxin load, now rate controlled with paroxysmal a. fib.    #a. fib/flutter with RVR  - Continue to monitor on telemetry  - Continue metoprolol 100 ER BID, discontinue diltiazem and monitor heart rate off calcium channel blocker  - Decrease digoxin from 0.25 to 0.125 mg oral daily   - CHADVASc score 2, recommend starting Eliquis 5mg BID for anticoagulation for a. fib, patient currently reconsidering decision  - Patient may not be a good ablation candidate with concurrent a flutter and fibrillation    Patient discussed with attending.    Gale Colvin MD  Cardiology Fellow  [phone]  All Cardiology service information can be found 24/7 on [email or web address], password: [employer] 68M with COPD, HTN, HLD, adenocarcinoma of the lung (on Keytruda and Olympta) at [facility] initially presenting with cellulitis, found to be in atrial flutter/fib with RVR requiring aggressive rate control and digoxin load, now rate controlled with paroxysmal a. fib.    #a. fib/flutter with RVR  - Continue to monitor on telemetry  - Continue metoprolol 100 ER BID for discharge, discontinue diltiazem and monitor heart rate off calcium channel blocker, currently adequately rate controlled  - Decrease digoxin from 0.25 to 0.125 mg oral daily  - CHADVASc score 2, recommend starting Eliquis 5mg BID for anticoagulation for a. fib, patient currently reconsidering decision  - EP will sign off, please contact us if additional questions    Patient discussed with attending.    Gale Colvin MD  Cardiology Fellow  [phone]  All Cardiology service information can be found 24/7 on [email or web address], password: [email or web address]

## 2019-10-24 NOTE — PROGRESS NOTE ADULT - PROBLEM SELECTOR PLAN 4
- C/w metoprolol succinate 100 mg BID, amlodipine 10 mg daily and quinapril 40 mg daily (from home)  - Patient wants to continue amlodipine despite its likely cause of pedal edema

## 2019-10-24 NOTE — PROGRESS NOTE ADULT - PROBLEM SELECTOR PLAN 2
- 2 mo history of b/l erythema (R>L), initially thought to be cellulitis, which has failed Augmentin therapy x 2 (completed) and Keflex (incomplete due to ABD pain), s/p 1g vancomycin x1.  - Thought to be an immune rxn to cancer therapy vs cellulitis  - C/w vancomycin (10/21), ceftriaxone (10/22)  - Dermatology consult  - Monitor vitals and trend CBC

## 2019-10-25 ENCOUNTER — TRANSCRIPTION ENCOUNTER (OUTPATIENT)
Age: 69
End: 2019-10-25

## 2019-10-25 ENCOUNTER — APPOINTMENT (OUTPATIENT)
Dept: HEMATOLOGY ONCOLOGY | Facility: CLINIC | Age: 69
End: 2019-10-25

## 2019-10-25 VITALS
HEART RATE: 78 BPM | OXYGEN SATURATION: 96 % | DIASTOLIC BLOOD PRESSURE: 78 MMHG | RESPIRATION RATE: 18 BRPM | SYSTOLIC BLOOD PRESSURE: 122 MMHG | TEMPERATURE: 98 F

## 2019-10-25 PROCEDURE — 99232 SBSQ HOSP IP/OBS MODERATE 35: CPT

## 2019-10-25 PROCEDURE — 99239 HOSP IP/OBS DSCHRG MGMT >30: CPT

## 2019-10-25 PROCEDURE — 77300 RADIATION THERAPY DOSE PLAN: CPT | Mod: 26

## 2019-10-25 PROCEDURE — 77334 RADIATION TREATMENT AID(S): CPT | Mod: 26

## 2019-10-25 PROCEDURE — 99223 1ST HOSP IP/OBS HIGH 75: CPT

## 2019-10-25 PROCEDURE — 77293 RESPIRATOR MOTION MGMT SIMUL: CPT | Mod: 26

## 2019-10-25 PROCEDURE — 77295 3-D RADIOTHERAPY PLAN: CPT | Mod: 26

## 2019-10-25 RX ORDER — METOPROLOL TARTRATE 50 MG
1 TABLET ORAL
Qty: 0 | Refills: 0 | DISCHARGE
Start: 2019-10-25

## 2019-10-25 RX ORDER — ACETAMINOPHEN 500 MG
1000 TABLET ORAL ONCE
Refills: 0 | Status: COMPLETED | OUTPATIENT
Start: 2019-10-25 | End: 2019-10-25

## 2019-10-25 RX ORDER — DIGOXIN 250 MCG
1 TABLET ORAL
Qty: 30 | Refills: 0
Start: 2019-10-25 | End: 2019-11-23

## 2019-10-25 RX ORDER — SACCHAROMYCES BOULARDII 250 MG
1 POWDER IN PACKET (EA) ORAL
Qty: 0 | Refills: 0 | DISCHARGE
Start: 2019-10-25

## 2019-10-25 RX ORDER — APIXABAN 2.5 MG/1
1 TABLET, FILM COATED ORAL
Qty: 60 | Refills: 0
Start: 2019-10-25 | End: 2019-11-23

## 2019-10-25 RX ORDER — AMLODIPINE BESYLATE 2.5 MG/1
1 TABLET ORAL
Qty: 0 | Refills: 0 | DISCHARGE

## 2019-10-25 RX ORDER — LIDOCAINE 4 G/100G
1 CREAM TOPICAL
Qty: 30 | Refills: 0
Start: 2019-10-25 | End: 2019-11-23

## 2019-10-25 RX ORDER — QUINAPRIL HYDROCHLORIDE 40 MG/1
1 TABLET, FILM COATED ORAL
Qty: 0 | Refills: 0 | DISCHARGE

## 2019-10-25 RX ORDER — ALBUTEROL 90 UG/1
1 AEROSOL, METERED ORAL
Qty: 3.7 | Refills: 0
Start: 2019-10-25 | End: 2019-11-23

## 2019-10-25 RX ORDER — METOPROLOL TARTRATE 50 MG
1 TABLET ORAL
Qty: 0 | Refills: 0 | DISCHARGE

## 2019-10-25 RX ORDER — LIDOCAINE 4 G/100G
1 CREAM TOPICAL ONCE
Refills: 0 | Status: COMPLETED | OUTPATIENT
Start: 2019-10-25 | End: 2019-10-25

## 2019-10-25 RX ADMIN — Medication 1 APPLICATION(S): at 17:32

## 2019-10-25 RX ADMIN — ALBUTEROL 1 PUFF(S): 90 AEROSOL, METERED ORAL at 12:32

## 2019-10-25 RX ADMIN — Medication 0.12 MILLIGRAM(S): at 05:03

## 2019-10-25 RX ADMIN — LIDOCAINE 1 PATCH: 4 CREAM TOPICAL at 17:30

## 2019-10-25 RX ADMIN — Medication 1 MILLIGRAM(S): at 12:32

## 2019-10-25 RX ADMIN — LIDOCAINE 1 PATCH: 4 CREAM TOPICAL at 05:03

## 2019-10-25 RX ADMIN — Medication 1 APPLICATION(S): at 05:02

## 2019-10-25 RX ADMIN — ENOXAPARIN SODIUM 40 MILLIGRAM(S): 100 INJECTION SUBCUTANEOUS at 12:32

## 2019-10-25 RX ADMIN — Medication 250 MILLIGRAM(S): at 17:31

## 2019-10-25 RX ADMIN — Medication 1000 MILLIGRAM(S): at 04:42

## 2019-10-25 RX ADMIN — Medication 250 MILLIGRAM(S): at 05:02

## 2019-10-25 RX ADMIN — LIDOCAINE 1 PATCH: 4 CREAM TOPICAL at 07:48

## 2019-10-25 RX ADMIN — UMECLIDINIUM BROMIDE AND VILANTEROL TRIFENATATE 1 PUFF(S): 62.5; 25 POWDER RESPIRATORY (INHALATION) at 11:25

## 2019-10-25 RX ADMIN — Medication 1000 MILLIGRAM(S): at 05:42

## 2019-10-25 RX ADMIN — Medication 100 MILLIGRAM(S): at 17:31

## 2019-10-25 RX ADMIN — ATORVASTATIN CALCIUM 20 MILLIGRAM(S): 80 TABLET, FILM COATED ORAL at 05:03

## 2019-10-25 NOTE — PROGRESS NOTE ADULT - PROBLEM SELECTOR PLAN 1
- Abrupt onset of Afib/Flutter likely 2/2 indeterminate cause  - Metoprolol 100 BID, digoxin 0.125  - Cardiology following - Abrupt onset of Afib/Flutter likely 2/2 indeterminate cause  - Metoprolol 100 BID, digoxin 0.125  - Dr. Rodgers has approved AC, patient to initiate Eliquis  - Cardiology following

## 2019-10-25 NOTE — PROGRESS NOTE ADULT - SUBJECTIVE AND OBJECTIVE BOX
VICK ALLEN 68y [id number]    Patient is a 68y old  Male who presents with a chief complaint of Cellulitis (25 Oct 2019 06:49)      Follow Up/CC:  ID following for foot swelling    Interval History/ROS: no fever, some right foot pain - lidocaine patch helping     Allergies    Keflex (Other (Severe))    Intolerances        ANTIMICROBIALS:      MEDICATIONS  (STANDING):  ALPRAZolam 0.5 milliGRAM(s) Oral at bedtime  atorvastatin 20 milliGRAM(s) Oral at bedtime  clobetasol 0.05% Ointment 1 Application(s) Topical two times a day  clotrimazole 1% Cream 1 Application(s) Topical every 12 hours  digoxin     Tablet 0.125 milliGRAM(s) Oral daily  enoxaparin Injectable 40 milliGRAM(s) SubCutaneous daily  folic acid 1 milliGRAM(s) Oral daily  melatonin 10 milliGRAM(s) Oral at bedtime  metoprolol succinate  milliGRAM(s) Oral two times a day  saccharomyces boulardii 250 milliGRAM(s) Oral two times a day  senna 2 Tablet(s) Oral at bedtime  umeclidinium 62.5 MICROgram(s)/vilanterol 25 MICROgram(s) Inhaler 1 Puff(s) Inhalation daily    MEDICATIONS  (PRN):  ALBUTerol    90 MICROgram(s) HFA Inhaler 1 Puff(s) Inhalation four times a day PRN Shortness of Breath and/or Wheezing        Vital Signs Last 24 Hrs  T(C): 36.4 (25 Oct 2019 11:20), Max: 36.8 (24 Oct 2019 21:35)  T(F): 97.5 (25 Oct 2019 11:20), Max: 98.2 (24 Oct 2019 21:35)  HR: 70 (25 Oct 2019 12:29) (65 - 76)  BP: 104/69 (25 Oct 2019 12:29) (94/60 - 104/69)  BP(mean): --  RR: 17 (25 Oct 2019 12:29) (17 - 18)  SpO2: 95% (25 Oct 2019 12:29) (93% - 96%)    CBC Full  -  ( 24 Oct 2019 08:59 )  WBC Count : 3.78 K/uL  RBC Count : 3.16 M/uL  Hemoglobin : 9.8 g/dL  Hematocrit : 30.7 %  Platelet Count - Automated : 252 K/uL  Mean Cell Volume : 97.2 fl  Mean Cell Hemoglobin : 31.0 pg  Mean Cell Hemoglobin Concentration : 31.9 gm/dL  Auto Neutrophil # : x  Auto Lymphocyte # : x  Auto Monocyte # : x  Auto Eosinophil # : x  Auto Basophil # : x  Auto Neutrophil % : x  Auto Lymphocyte % : x  Auto Monocyte % : x  Auto Eosinophil % : x  Auto Basophil % : x    10-24    140  |  99  |  12  ----------------------------<  92  3.8   |  24  |  0.91    Ca    8.3<L>      24 Oct 2019 05:44  Phos  2.9     10-24  Mg     1.7     10-24    TPro  5.9<L>  /  Alb  2.5<L>  /  TBili  0.3  /  DBili  x   /  AST  29  /  ALT  33  /  AlkPhos  72  10-24    LIVER FUNCTIONS - ( 24 Oct 2019 05:44 )  Alb: 2.5 g/dL / Pro: 5.9 g/dL / ALK PHOS: 72 U/L / ALT: 33 U/L / AST: 29 U/L / GGT: x               MICROBIOLOGY:  .Blood  10-21-19   No growth to date.  --  --      RADIOLOGY    < from: Xray Foot AP + Lateral + Oblique, Right (10.23.19 @ 10:57) >  There is diffuse soft tissue swelling in the foot and ankle. No acute   displaced fracture or dislocation is seen. Apparent irregularity of the   fourth toe proximal phalanx may be projectional. No acute cortical   erosive changes are seen. Mild arthritic changes at the first   metatarsophalangeal joint. Hallux valgus.    < end of copied text >

## 2019-10-25 NOTE — PROVIDER CONTACT NOTE (OTHER) - ASSESSMENT
Pt AAOx4, sleeping while event occurred. HR improved s/p initiation of dig load. BP 80/;40, all other VSS. Pt denies CP, SOB, palps, or dizziness.
Pt is alert and oriented x4, pt c/o SOB, PO2 96% on RA. Pt has no wheezing on assessment. BP 94/60
Pt. asymptomatic, laying in bed does not complain of pain or any other discomforts
Patient A&Ox4, no s/s of distress. Patient's BP 92/60 HR 69.
Pt AAOx4, NAD. HR up to 150, all other VSS. Pt denies CP, SOB, or palps.
VSS. No c/o of CP, SOB, and/or palpitations

## 2019-10-25 NOTE — DISCHARGE NOTE NURSING/CASE MANAGEMENT/SOCIAL WORK - PATIENT PORTAL LINK FT
You can access the FollowMyHealth Patient Portal offered by [facility] by registering at the following website: [email or web address]. By joining [facility]’s FollowMyHealth portal, you will also be able to view your health information using other applications (apps) compatible with our system.

## 2019-10-25 NOTE — DISCHARGE NOTE NURSING/CASE MANAGEMENT/SOCIAL WORK - NSDCFUADDAPPT_GEN_ALL_CORE_FT
Milka Rodgers; MBBS)  Hematology; [facility]; Medical Oncology  [address] [place] [id number]  Phone: [phone]  Fax: [phone]  Follow Up Time: 1 week    Manny Dorantes (DO)  Cardiology; Internal Medicine; Nuclear Cardiology  [address], [address]  [place] [address]  Phone: [phone]  Fax: [phone]  Follow Up Time: 1 week    Claudio Blanchard)  Radiation Oncology  [address]tr[address]  [place] [address]  Phone: [phone]  Fax: [phone]  Follow Up Time: 1 week

## 2019-10-25 NOTE — PROVIDER CONTACT NOTE (OTHER) - DATE AND TIME:
23-Oct-2019 03:35
22-Oct-2019 20:55
23-Oct-2019 03:35
24-Oct-2019 23:15
25-Oct-2019 05:38
25-Oct-2019 11:52

## 2019-10-25 NOTE — CONSULT NOTE ADULT - SUBJECTIVE AND OBJECTIVE BOX
HPI:  67 y/o M h/o COPD, HTN, stage IV lung adenocarcinoma on pemetrexed/pembrolizumab admitted for cellulitis of R foot x 2 months. Broke R 4th toe 2 months ago, and subsequently noticed an area of redness on the dorsal foot. Now s/p partial courses of augmentin, Keflex, doxycycline without significant improvement. Presented to ED on 10/21 and was admitted for r/o cellulitis. Remains afebrile, w/o leukocytosis. Started on vanc/CTX. Not itchy, only painful on deep palpation. No hx of ulceration or drainage.     Since last seen, swelling has improved significantly after switching off amlodipine. ABX d/c'd. C/o residual pain today, relieved by lido patch.       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  Irritable bowel syndrome  Nerve damage: posterior cervical region of neck  Inguinal hernia  Metastatic disease  Adenocarcinoma of lung  Spondylolisthesis  HLD (hyperlipidemia)  HTN (hypertension)  COPD (chronic obstructive pulmonary disease)  History of arthroscopic knee surgery      REVIEW OF SYSTEMS    General: no fevers/chills, no lethargy	    Skin/Breast: see HPI  	  Ophthalmologic: no eye pain or change in vision  	  ENMT: no dysphagia or change in hearing    Respiratory and Thorax: no SOB or cough  	  Cardiovascular: no palpitations or chest pain    Gastrointestinal: no abdominal pain or blood in stool     Genitourinary: no dysuria or frequency    Musculoskeletal: no joint pains    Neurological: no weakness, numbness , or tingling    MEDICATIONS  (STANDING):  ALPRAZolam 0.5 milliGRAM(s) Oral at bedtime  atorvastatin 20 milliGRAM(s) Oral at bedtime  clobetasol 0.05% Ointment 1 Application(s) Topical two times a day  clotrimazole 1% Cream 1 Application(s) Topical every 12 hours  digoxin     Tablet 0.125 milliGRAM(s) Oral daily  enoxaparin Injectable 40 milliGRAM(s) SubCutaneous daily  folic acid 1 milliGRAM(s) Oral daily  melatonin 10 milliGRAM(s) Oral at bedtime  metoprolol succinate  milliGRAM(s) Oral two times a day  saccharomyces boulardii 250 milliGRAM(s) Oral two times a day  senna 2 Tablet(s) Oral at bedtime  umeclidinium 62.5 MICROgram(s)/vilanterol 25 MICROgram(s) Inhaler 1 Puff(s) Inhalation daily    MEDICATIONS  (PRN):  ALBUTerol    90 MICROgram(s) HFA Inhaler 1 Puff(s) Inhalation four times a day PRN Shortness of Breath and/or Wheezing      Allergies    Keflex (Other (Severe))    Intolerances        SOCIAL HISTORY:    FAMILY HISTORY:  No pertinent family history in first degree relatives      Vital Signs Last 24 Hrs  T(C): 36.4 (25 Oct 2019 11:20), Max: 36.8 (24 Oct 2019 21:35)  T(F): 97.5 (25 Oct 2019 11:20), Max: 98.2 (24 Oct 2019 21:35)  HR: 58 (25 Oct 2019 13:58) (58 - 76)  BP: 102/68 (25 Oct 2019 13:58) (94/60 - 104/69)  BP(mean): --  RR: 17 (25 Oct 2019 12:29) (17 - 18)  SpO2: 95% (25 Oct 2019 12:29) (93% - 96%)    PHYSICAL EXAM:     The patient was alert and oriented X 3, well nourished, and in no  apparent distress.  OP showed no ulcerations  There was no visible lymphadenopathy.  Conjunctiva were non injected  There was no clubbing or edema of extremities.  The scalp, hair, face, eyebrows, lips, OP, neck, chest, back,   extremities X 4, nails were examined.  There was no hyperhidrosis or bromhidrosis.    Of note on skin exam:   - blanchable, ill defined erythematous patch over b/l dorsal feet, symmetric bilaterally. 2+ pitting edema to mid calf  - palms/soles not involved     LABS:                        9.8    3.78  )-----------( 252      ( 24 Oct 2019 08:59 )             30.7     10-24    140  |  99  |  12  ----------------------------<  92  3.8   |  24  |  0.91    Ca    8.3<L>      24 Oct 2019 05:44  Phos  2.9     10-24  Mg     1.7     10-24    TPro  5.9<L>  /  Alb  2.5<L>  /  TBili  0.3  /  DBili  x   /  AST  29  /  ALT  33  /  AlkPhos  72  10-24          RADIOLOGY & ADDITIONAL STUDIES:

## 2019-10-25 NOTE — DISCHARGE NOTE PROVIDER - NSDCFUSCHEDAPPT_GEN_ALL_CORE_FT
VICK ALLEN ; 10/31/2019 ; [facility] [address] [place]  VICK ALLEN ; 11/05/2019 ; [facility]  VICK ALLEN ; 11/05/2019 ; [facility]  VICK ALLEN ; 11/26/2019 ; [facility]  VICK ALLEN ; 11/26/2019 ; [facility]  VICK ALLEN ; 12/17/2019 ; [facility]  VICK ALLEN ; 12/17/2019 ; [facility] VICK ALLEN ; 10/31/2019 ; [facility] [address] [place]  VICK ALLEN ; 11/05/2019 ; [facility]  VICK ALLEN ; 11/05/2019 ; [facility]  VICK ALLEN ; 11/26/2019 ; [facility]  VICK ALLEN ; 11/26/2019 ; [facility]  VICK ALLEN ; 12/17/2019 ; [facility]  VICK ALLEN ; 12/17/2019 ; [facility] VICK ALLEN ; 10/31/2019 ; [facility] [address] [place]  VICK ALLEN ; 11/05/2019 ; [facility]  VICK ALLEN ; 11/05/2019 ; [facility]  VICK ALLEN ; 11/26/2019 ; [facility]  VICK ALLEN ; 11/26/2019 ; [facility]  VICK ALLEN ; 12/17/2019 ; [facility]  VICK ALLEN ; 12/17/2019 ; [facility] VICK ALLEN ; 10/31/2019 ; \[facility]\"" Cardio [address] [place]  VICK ALLEN ; 11/05/2019 ; [facility]  VICK ALLEN ; 11/05/2019 ; [facility]  VICK ALLEN ; 11/26/2019 ; [facility]  VICK ALLEN ; 11/26/2019 ; [facility]  VICK ALLEN ; 12/17/2019 ; [facility]  VICK ALLEN ; 12/17/2019 ; [facility] VICK ALLEN ; 10/31/2019 ; [facility] [address] [place]  VICK ALLEN ; 11/05/2019 ; [facility]  VICK ALLEN ; 11/05/2019 ; [facility]  VICK ALLEN ; 11/26/2019 ; [facility]  VICK ALLEN ; 11/26/2019 ; [facility]  VICK ALLEN ; 12/17/2019 ; [facility]  VICK ALLEN ; 12/17/2019 ; [facility]

## 2019-10-25 NOTE — DISCHARGE NOTE PROVIDER - NSDCCPTREATMENT_GEN_ALL_CORE_FT
PRINCIPAL PROCEDURE  Procedure: Transthoracic echo  Findings and Treatment: Dimensions:    Normal Values:  LA:     3.7    2.0 - 4.0 cm  Ao:     3.7    2.0 - 3.8 cm  SEPTUM: 0.8    0.6 - 1.2 cm  PWT:    0.8    0.6 - 1.1 cm  LVIDd:  4.2    3.0 - 5.6 cm  LVIDs:  2.5    1.8 - 4.0 cm  Derived variables:  LVMI: 60 g/m2  RWT: 0.38  Fractional short: 40 %  EF (Visual Estimate): 60-65 %  EF (Trimble Rule): 60 %Doppler Peak Velocity (m/sec):  AoV=1.2  ------------------------------------------------------------------------  Conclusions:  1. Normal mitral valve. Mild mitral regurgitation.  2. Normal left ventricular internal dimensions and wall  thicknesses.  3. Normal left ventricular systolic function. No segmental  wall motion abnormalities.  4. Normal right ventricular size and systolic function.  5. Estimated right ventricular systolic pressure equals 24  mm Hg, assuming right atrial pressure equals 8 mm Hg,  consistent with normal pulmonary pressures.  6. Normal pericardium with trace to small circumferential  pericardial effusion (largest measurement 0.8 cm inferior  to the left ventricle in the subcostal view). No  echocardiographic evidence of tamponade is seen (No loss of  IVC variation with respiration, no respiratory variation of  flow >25% across the mitral valve, no late diastolic RA or  early diastolic RV diastolic collapse)  *** Compared with echocardiogram of 6/14/2017, a trace to  small pericardial effusion is now present. Other findings  are grossly similar.

## 2019-10-25 NOTE — PROGRESS NOTE ADULT - ASSESSMENT
68 year old male with COPD HTN, HLD, adenocarcinoma of the lung (on Keytruda and Olympta) at [facility] presents after failed outpatient treatment of cellulitis of the right foot, admitted for IV antibiotic treatment.

## 2019-10-25 NOTE — CONSULT NOTE ADULT - ASSESSMENT
# Bilateral dorsal foot erythema  DDx favoring stasis dermatitis > cellulitis. No tenderness, systemic s/sx, leukocytosis. Significant underlying edema appreciated bilaterally - now improved Morphology also atypical for cutaneous AEs to pemetrexed/pembrolizumab.    - cont clobetasol 0.05% oint BID to affected areas  - would apply compression with ACE wraps or stockings to b/l LE  - d/c planning per primary team  - pt may f/u with Dr. Sanches at [facility] after d/c for further Derm care     Please call [phone] with any questions.    Shayla Gamez MD  PGY3, Dermatology

## 2019-10-25 NOTE — DISCHARGE NOTE PROVIDER - CARE PROVIDERS DIRECT ADDRESSES
[email or web address]

## 2019-10-25 NOTE — PROGRESS NOTE ADULT - PROBLEM SELECTOR PLAN 3
- Diagnosed 1 year ago  - Follows oncologist is Dr. Milka Rodgers at [facility]. - S/p 4 rounds of Keytruda, Olympta, and carboplatin; now on Keytruda and Olympta, to start radiation, drug trial at [facility].   - Per Oncology, no inpatient treatment, to follow-up with oncologist outpatient

## 2019-10-25 NOTE — PROGRESS NOTE ADULT - ATTENDING COMMENTS
Patient case discussed with Fellow.
mild erythema foot - patient reports improvement with iv abx.  outpt abx regimen discussed - patient/wife reports a plethora of medication intolerances.  will review further.  f/up LE duplex for pedal edema  anxious personality  outpt onc f/up for stage 4 lung cancer    Jordan Gould MD, MHA, FACP, [facility]  Pager: [phone]  If no response or off-hours, page [phone]
Now bradycardic, d/c Cardizem.     D/c abx per ID.     Likely discharge tomorrow.
Rate controlled. Plan for discharge today.     D/c time 45 mins.
Pt transferred to tele for a fib/flutter. Rate now better controlled. Monitor.
Jl Hutchinson  Attending Physician   Division of Infectious Disease  Pager #[phone]  After 5pm/weekend or no response, call #[phone]    Please call the ID service [phone] with questions or concerns over the weekend.

## 2019-10-25 NOTE — CONSULT NOTE ADULT - CONSULT REASON
Cellulitis evaluation
RLE pain in setting of lung cancer
Rash on feet
Atrial flutter with rapid ventricular response

## 2019-10-25 NOTE — DISCHARGE NOTE PROVIDER - CARE PROVIDER_API CALL
Milka Rodgers; MBBS)  Hematology; [facility]; Medical Oncology  [address] [place] [id number]  Phone: [phone]  Fax: [phone]  Follow Up Time: 1 week    Manny Dorantes (DO)  Cardiology; Internal Medicine; Nuclear Cardiology  [address] [facility] [address] [facility], [address]  [place] [address]  Phone: [phone]  Fax: [phone]  Follow Up Time: 1 week    Claudio Blanchard)  Radiation Oncology  [address] [facility], [address] [place] [address]  Phone: [phone]  Fax: [phone]  Follow Up Time: 1 week

## 2019-10-25 NOTE — PROGRESS NOTE ADULT - PROVIDER SPECIALTY LIST ADULT
Electrophysiology
Heme/Onc
Infectious Disease
Internal Medicine

## 2019-10-25 NOTE — DISCHARGE NOTE PROVIDER - NSDCFUADDAPPT_GEN_ALL_CORE_FT
Milka Rodgers; MBBS)  Hematology; HospiceJohn [facility]og[facility] Hospitalliative Medicine; Medical Oncology  [address] [place] [id number]  Phone: [phone]  Fax: [phone]  Follow Up Time: 1 week    Manny Dorantes (DO)  Cardiology; Internal Medicine; Nuclear Cardiology  [address] [facility], [address]  [place] [address]  Phone: [phone]  Fax: [phone]  Follow Up Time: 1 week    Claudio Blanchard)  Radiation Oncology  [address] [facility]  [place] [address]  Phone: [phone]  Fax: [phone]  Follow Up Time: 1 week

## 2019-10-25 NOTE — PROVIDER CONTACT NOTE (OTHER) - RECOMMENDATIONS
EKG
PRN Proventil offered, pt requested clarification from MD   BP 94/60, Metoprolol 100mg XL due , reschedule it ?
Notify MD.
Notify provider. AM Labs to be ordered?

## 2019-10-25 NOTE — PROVIDER CONTACT NOTE (OTHER) - BACKGROUND
68 year old male with COPD HTN, HLD, adenocarcinoma of the lung (on Keytruda and Olympta) at [facility] presents after failed outpatient treatment of cellulitis of the right foot
Pt admitted for cellulitis of the R foot. N/w AFL w/ RVR.
Admitted for cellulitis. Acquired new onset Afib during visit. PMHx of adenocarcinoma (on chemo), HTN, and COPD
Patient admitted for cellulitis s/p RRT for onset atrial flutter with RVR. PMH lymphadenopathy, adenocarcinoma of lung, spondylolisthesis
Pt admitted for cellulitis of the R foot. N/w AFL w/ RVR.

## 2019-10-25 NOTE — PROGRESS NOTE ADULT - EXTREMITIES COMMENTS
slight swelling/edema of right foot on dorsal aspect - pinkish hue, not indurated, not c/w cellulitis, no purulence

## 2019-10-25 NOTE — PROGRESS NOTE ADULT - SUBJECTIVE AND OBJECTIVE BOX
Contact Information:  Maria D Mo II, MD, MPH  PGY-1, Internal Medicine  Pager: [phone] ([facility]) /// [phone] ([facility])    VICK ALLEN, MRN-[id number]    Patient is a 68y old  Male who presents with a chief complaint of Cellulitis (25 Oct 2019 06:01)      OVERNIGHT EVENTS:    SUBJECTIVE:    CONSTITUTIONAL: No weakness. No fatigue. No fever.  HEAD: No head trauma.   EYES: No vision changes.  ENT: No hearing changes or tinnitus. No ear pain. No changes in smell. No nasal congestion or discharge. No sore throat. No voice hoarseness.   NECK: No neck pain or stiffness. No lumps.  RESPIRATORY: No cough. No SOB. No wheezing. No hemoptysis.   CARDIOVASCULAR: No chest pain. No palpitations.   GASTROINTESTINAL: No dysphagia. No ABD pain. No distension. No constipation. No diarrhea. No pain with defecation. No hematemesis. No hematochezia or melena.  BACK: No back pain.  GENITOURINARY: No dysuria. No frequency or urgency. No hesitancy. No incontinence. No urinary retention. No suprapubic pain. No hematuria.  EXTREMITY: No swelling.  MUSCULOSKELETAL: No joint pain or swelling. No fractures. No stiffness.    SKIN: No rashes. No itching. No skin, hair, or nail changes.  NEUROLOGICAL: No weakness or paralysis. No lightheadedness or dizziness. No HA. No numbness or tingling.   PSYCHIATRIC: No depression.       OBJECTIVE:  Vital Signs Last 24 Hrs  T(C): 36.7 (25 Oct 2019 04:00), Max: 36.8 (24 Oct 2019 21:35)  T(F): 98.1 (25 Oct 2019 04:00), Max: 98.2 (24 Oct 2019 21:35)  HR: 68 (25 Oct 2019 05:08) (67 - 76)  BP: 104/67 (25 Oct 2019 05:08) (91/62 - 104/67)  BP(mean): --  RR: 18 (25 Oct 2019 05:08) (18 - 18)  SpO2: 95% (25 Oct 2019 05:08) (93% - 96%)  I&O's Summary    23 Oct 2019 07:01  -  24 Oct 2019 07:00  --------------------------------------------------------  IN: 720 mL / OUT: 0 mL / NET: 720 mL        MEDICATIONS  (STANDING):  ALPRAZolam 0.5 milliGRAM(s) Oral at bedtime  atorvastatin 20 milliGRAM(s) Oral at bedtime  cefTRIAXone   IVPB 1000 milliGRAM(s) IV Intermittent every 24 hours  clobetasol 0.05% Ointment 1 Application(s) Topical two times a day  clotrimazole 1% Cream 1 Application(s) Topical every 12 hours  digoxin     Tablet 0.125 milliGRAM(s) Oral daily  enoxaparin Injectable 40 milliGRAM(s) SubCutaneous daily  folic acid 1 milliGRAM(s) Oral daily  melatonin 10 milliGRAM(s) Oral at bedtime  metoprolol succinate  milliGRAM(s) Oral two times a day  saccharomyces boulardii 250 milliGRAM(s) Oral two times a day  senna 2 Tablet(s) Oral at bedtime  umeclidinium 62.5 MICROgram(s)/vilanterol 25 MICROgram(s) Inhaler 1 Puff(s) Inhalation daily    MEDICATIONS  (PRN):  ALBUTerol    90 MICROgram(s) HFA Inhaler 1 Puff(s) Inhalation four times a day PRN Shortness of Breath and/or Wheezing    Allergies    Keflex (Other (Severe))    Intolerances        CONSTITUTIONAL: No acute distress. Awake and alert.  HEAD: No evidence of trauma. Structures WNL.  EYES: +PERRL. +EOMI. No scleral icterus. No conjunctival injection.  ENT: Moist oral mucosa. No erythema. No pharyngeal exudates.   NECK: Supple. Appropriate ROM. No stiffness. No masses or lymphadenopathy.  RESPIRATORY: CTAB. No wheezes, rales, or rhonchi. No accessory muscle use. No apparent respiratory distress.  CARDIOVASCULAR: +S1/S2. No audible S3/S4. Regular rate and rhythm. No murmurs, rubs, or gallops. 2+ radial pulses x b/l UE; 2+ DP pulses x b/l LE.   GASTROINTESTINAL: Soft, nontender, nondistended. +BS. No rebound or guarding.   BACK: No spinal or paraspinal tenderness. No CVA tenderness.  EXTREMITY: No LE swelling or edema. EXTs warm to touch.  MUSCULOSKELETAL: Movement evident in all limbs. No tenderness on palpation.  DERMATOLOGICAL: No abnormal rashes or lesions.  NEUROLOGICAL: CN 2-12 grossly intact. No focal deficits. Sensation intact x 4EXT. A&Ox3 (oriented to person, place, and time).  PSYCHIATRIC: Appropriate affect.                            9.8    3.78  )-----------( 252      ( 24 Oct 2019 08:59 )             30.7       10-24    140  |  99  |  12  ----------------------------<  92  3.8   |  24  |  0.91    Ca    8.3<L>      24 Oct 2019 05:44  Phos  2.9     10-24  Mg     1.7     10-24    TPro  5.9<L>  /  Alb  2.5<L>  /  TBili  0.3  /  DBili  x   /  AST  29  /  ALT  33  /  AlkPhos  72  10-24    CAPILLARY BLOOD GLUCOSE        LIVER FUNCTIONS - ( 24 Oct 2019 05:44 )  Alb: 2.5 g/dL / Pro: 5.9 g/dL / ALK PHOS: 72 U/L / ALT: 33 U/L / AST: 29 U/L / GGT: x                       RADIOLOGY AND ADDITIONAL TESTS:    CONSULTANT NOTES REVIEWED:    CARE DISCUSSED WITH THE FOLLOWING CONSULTANTS/PROVIDERS: Contact Information:  Maria D Mo II, MD, MPH  PGY-1, Internal Medicine  Pager: [phone] ([facility]) /// [phone] ([facility])    VICK ALLEN, MRN-[id number]    Patient is a 68y old  Male who presents with a chief complaint of Cellulitis (25 Oct 2019 06:01)      OVERNIGHT EVENTS: Increased foot pain, got lidocaine patch and tylenol.    SUBJECTIVE: Patient evaluated at bedside. Complaining of b/l foot pain controlled by analgesia.    CONSTITUTIONAL: No weakness. No fatigue. No fever.  HEAD: No head trauma.   EYES: No vision changes.  ENT: No hearing changes or tinnitus. No ear pain. No changes in smell. No nasal congestion or discharge. No sore throat. No voice hoarseness.   NECK: No neck pain or stiffness. No lumps.  RESPIRATORY: No cough. No SOB. No wheezing. No hemoptysis.   CARDIOVASCULAR: No chest pain. No palpitations.   GASTROINTESTINAL: No dysphagia. No ABD pain. No distension. No constipation. No diarrhea. No pain with defecation. No hematemesis. No hematochezia or melena.  BACK: No back pain.  GENITOURINARY: No dysuria. No frequency or urgency. No hesitancy. No incontinence. No urinary retention. No suprapubic pain. No hematuria.  EXTREMITY: No swelling.  MUSCULOSKELETAL: +Foot pain. No fractures. No stiffness.    SKIN: No rashes. No itching. No skin, hair, or nail changes.  NEUROLOGICAL: No weakness or paralysis. No lightheadedness or dizziness. No HA. No numbness or tingling.   PSYCHIATRIC: No depression.       OBJECTIVE:  Vital Signs Last 24 Hrs  T(C): 36.7 (25 Oct 2019 04:00), Max: 36.8 (24 Oct 2019 21:35)  T(F): 98.1 (25 Oct 2019 04:00), Max: 98.2 (24 Oct 2019 21:35)  HR: 68 (25 Oct 2019 05:08) (67 - 76)  BP: 104/67 (25 Oct 2019 05:08) (91/62 - 104/67)  BP(mean): --  RR: 18 (25 Oct 2019 05:08) (18 - 18)  SpO2: 95% (25 Oct 2019 05:08) (93% - 96%)  I&O's Summary    23 Oct 2019 07:01  -  24 Oct 2019 07:00  --------------------------------------------------------  IN: 720 mL / OUT: 0 mL / NET: 720 mL        MEDICATIONS  (STANDING):  ALPRAZolam 0.5 milliGRAM(s) Oral at bedtime  atorvastatin 20 milliGRAM(s) Oral at bedtime  cefTRIAXone   IVPB 1000 milliGRAM(s) IV Intermittent every 24 hours  clobetasol 0.05% Ointment 1 Application(s) Topical two times a day  clotrimazole 1% Cream 1 Application(s) Topical every 12 hours  digoxin     Tablet 0.125 milliGRAM(s) Oral daily  enoxaparin Injectable 40 milliGRAM(s) SubCutaneous daily  folic acid 1 milliGRAM(s) Oral daily  melatonin 10 milliGRAM(s) Oral at bedtime  metoprolol succinate  milliGRAM(s) Oral two times a day  saccharomyces boulardii 250 milliGRAM(s) Oral two times a day  senna 2 Tablet(s) Oral at bedtime  umeclidinium 62.5 MICROgram(s)/vilanterol 25 MICROgram(s) Inhaler 1 Puff(s) Inhalation daily    MEDICATIONS  (PRN):  ALBUTerol    90 MICROgram(s) HFA Inhaler 1 Puff(s) Inhalation four times a day PRN Shortness of Breath and/or Wheezing    Allergies    Keflex (Other (Severe))    Intolerances        CONSTITUTIONAL: No acute distress. Awake and alert.  EYES: +EOMI. No scleral icterus. No conjunctival injection.  ENT: Moist oral mucosa. No erythema. No pharyngeal exudates.   NECK: Supple.  RESPIRATORY: CTAB. No wheezes, rales, or rhonchi. No accessory muscle use. No apparent respiratory distress.  CARDIOVASCULAR: +S1/S2. Tachycardic and irregular. No murmurs, rubs, or gallops. LE pulses attenuated due to LE edema.  GASTROINTESTINAL: Soft, nontender, nondistended. +BS. No rebound or guarding.   EXTREMITY: B/l dorsal foot swelling; dorsal erythema on R foot to ankle, dorsal erythema on L foot proximal to metatarsals  MUSCULOSKELETAL: Movement evident in all limbs. No tenderness on palpation.  DERMATOLOGICAL: Resolving erythema on dorsal aspect of both feet.  NEUROLOGICAL: CN 2-12 grossly intact. No focal deficits. Sensation intact x 4EXT. A&Ox3 (oriented to person, place, and time).  PSYCHIATRIC: Appropriate affect.                          9.8    3.78  )-----------( 252      ( 24 Oct 2019 08:59 )             30.7       10-24    140  |  99  |  12  ----------------------------<  92  3.8   |  24  |  0.91    Ca    8.3<L>      24 Oct 2019 05:44  Phos  2.9     10-24  Mg     1.7     10-24    TPro  5.9<L>  /  Alb  2.5<L>  /  TBili  0.3  /  DBili  x   /  AST  29  /  ALT  33  /  AlkPhos  72  10-24    CAPILLARY BLOOD GLUCOSE        LIVER FUNCTIONS - ( 24 Oct 2019 05:44 )  Alb: 2.5 g/dL / Pro: 5.9 g/dL / ALK PHOS: 72 U/L / ALT: 33 U/L / AST: 29 U/L / GGT: x                       RADIOLOGY AND ADDITIONAL TESTS:    CONSULTANT NOTES REVIEWED:    CARE DISCUSSED WITH THE FOLLOWING CONSULTANTS/PROVIDERS:

## 2019-10-25 NOTE — DISCHARGE NOTE PROVIDER - PROVIDER TOKENS
PROVIDER:[TOKEN:[[id number]],FOLLOWUP:[1 week]],PROVIDER:[TOKEN:[[id number]],FOLLOWUP:[1 week]],PROVIDER:[TOKEN:[[id number]],FOLLOWUP:[1 week]]

## 2019-10-25 NOTE — DISCHARGE NOTE PROVIDER - NSDCCPCAREPLAN_GEN_ALL_CORE_FT
PRINCIPAL DISCHARGE DIAGNOSIS  Diagnosis: Erythema of lower extremity  Assessment and Plan of Treatment: You were admitted because of lower extremity redness and swelling. You had multiple previous trials of antibiotics that did not resolve the swelling. You were admitted and treated for cellulitis. Several doctors, including the Infectious Disease, Dermatology, and Oncology specialists, determined that the lower extremity redness and swelling was likely not an infection, but most likely a reaction to amlodipine (causing the swelling) and chemotherapy-related skin redness. The antibiotics were discontinued and you received clobetasol cream for the redness. You also received lidocaine for the pain. This new combination helped your pain. You are advised to continue taking the clobetasol cream; you are also advised to stop taking the amlodipine as it likely the cause of your foot swelling. You are advised to follow-up with your Oncologist and your Cardiologist.  Milka Rodgers; MBERVIN)  Hematology; HospicePalliative Medicine; Medical Oncology  [address] [id number]  Phone: [phone]  Fax: [phone]  Follow Up Time: 1 week  Manny Dorantes ()  Cardiology; Internal Medicine; Nuclear Cardiology  [address] [facility], [address]  [place] [address]  Phone: [phone]  Fax: [phone]  Follow Up Time: 1 week  Claudio Blanchard)  Radiation Oncology  [address] [place] [address], [facility]  [place] [address]  Phone: [phone]  Fax: [phone]  Follow Up Time: 1 week      SECONDARY DISCHARGE DIAGNOSES  Diagnosis: Atrial fibrillation/flutter  Assessment and Plan of Treatment: You were admitted because of foot redness and swelling. While in the hospital, you had an acute onset of a very fast and abnormal rhythm called atrial fibrillation, the cause of which was unclear. You were maintained on your metoprolol and given digoxin and cardizem for rate control. You were also started on Eliquis (anticoagulation) with the approval of your oncologist to prevent clots from causing a stroke. You are advised to follow up with your Oncologist and Cardiologist for continued care.  Milka Rodgers; MBBS)  Hematology; HospicePalliative Medicine; Medical Oncology  [address] [place] [id number]  Phone: [phone]  Fax: [phone]  Follow Up Time: 1 week  Manny Dorantes (DO)  Cardiology; Internal Medicine; Nuclear Cardiology  [address] [facility], [address]  [place] [address]  Phone: [phone]  Fax: [phone]  Follow Up Time: 1 week      Diagnosis: Adenocarcinoma of left lung  Assessment and Plan of Treatment: You were admitted because of foot swelling and edema. You have a history of lung cancer. You are advised to follow-up with your Oncologist and Radiation Oncologist for further management of your lung cancer:  Milka Rodgers; MBBS)  Hematology; [facility]; Medical Oncology  [address] [place] [id number]  Phone: [phone]  Fax: [phone]  Follow Up Time: 1 week  Claudio Blanchard)  Radiation Oncology  [address] [place] [address], [facility]  [place] [address]  Phone: [phone]  Fax: [phone]  Follow Up Time: 1 week

## 2019-10-25 NOTE — PROVIDER CONTACT NOTE (OTHER) - ACTION/TREATMENT ORDERED:
500 NS bolus given. Will reassess.
12-lead EKG. IVP Cardizem given w/o efficacy. Pt started on IV dig load.
MD informed. ok to use Proventil, Recheck BP in 30 minute and reassess the pt.
MD said he will order an EKG
MD made aware. Ordered to give diltiazem. Will continue to monitor.
Provider notified. Provider to sign off with day team to see if AM labs are needed. Will continue to monitor.

## 2019-10-25 NOTE — PROGRESS NOTE ADULT - ASSESSMENT
68 year old male with COPD, HTN, HLD, metastatic lung adenocarcinoma (Dx 1 year, on Keytruda and Alimta) was admitted 10/21 after failed outpatient treatment of cellulitis of the right foot. Two months ago, he broke his right 4th toe and metatarsal, and subsequently developed redness and swelling. His podiatrist diagnosed him with cellulitis and he completed a 7 day course of Augmentin 500 mg BID, seeing an initial improvement, however the erythema soon returned. His Oncologist prescribed Keflex 500 mg TID, however he could not tolerate it due to severe abdominal pain. Last Saturday, the erythema advanced up his right ankle and he also noticed erythema appearing on the dorsum of the left foot. Repeat trial of Augmentin did not show improvement, after which he went to the ED. He was given 1g vancomycin x1 and ceftriaxone 1g daily; ID was consulted for evaluation of cellulitis.    Assessment/Plan:  - Remained afebrile with no leukocytosis during hospital course  - On exam, does not appear to have signs/symptoms of cellulitis  - More likely to be chronic edema 2/2 amlodipine and/or boot following fracture  - monitor off abx  - ?component of RSD given trauma?

## 2019-10-25 NOTE — PROGRESS NOTE ADULT - PROBLEM SELECTOR PLAN 2
- 2 mo history of b/l erythema (R>L), initially thought to be cellulitis, which has failed Augmentin therapy x 2 (completed) and Keflex (incomplete due to ABD pain), s/p 1g vancomycin x1.  - Thought to be an immune rxn to cancer therapy or CCB-related edema  - Abx discontinued  - Monitor vitals and trend CBC - 2 mo history of b/l erythema (R>L), initially thought to be cellulitis, which has failed Augmentin therapy x 2 (completed) and Keflex (incomplete due to ABD pain), s/p 1g vancomycin x1.  - Thought to be an immune rxn to cancer therapy or CCB-related edema  - Erythema resolving  - Monitor vitals and trend CBC

## 2019-10-25 NOTE — DISCHARGE NOTE PROVIDER - HOSPITAL COURSE
History of Present Illness    68 year old male with COPD HTN, HLD, adenocarcinoma of the lung (on Keytruda and Olympta) at [facility] presents after failed outpatient treatment of cellulitis of the right foot. Two months ago the patient broke his 4th toe and metatarsal on the right and developed cellulitis The patient reports that his oncologist thought that the combination of a possible break in the dry skin of his foot combined with his lower extremity swelling secondary to amlodipine may have caused this. His podiatrist prescribed Augmentin 500 mg BID for 7 days. He completed the course and saw an improvement in his foot, but th erythema soon returned. His Oncologist soon prescribed Kephlex 500 mg TID. The patient took two doses and stopped the medication because it caused severe abdominal pain. This reaction is one he had many years ago with this medication. Last Saturday, the patient noticed that the erythema have moved up his ankle and erythema started to appear on the dorsum of the left foot. He called his Oncologist who prescribed Augmentin 500 mg TID. He tried the medication until Monday and noticed no improvement. His oncologist instructed him to go to the ED for IV antibiotics. His temperature is usually 97.5F, but last night his temperature elevated to 98.3F. He took two Tylenol tablets and this morning his temperature returned to normal. He denies chills, sick contacts, chest pain, cough, shortness of breath, nausea, vomiting or diarrhea.        Of note, the patient was diagnosed approximately one year ago with lung cancer. The patient was originally treated with four rounds of Keytruda, Olympta, and carboplatin. The carboplatin was stopped and he continues to be treated with the Keytruda and Olympta. He will be starting radiation therapy soon. He will also be partaking in a drug trial at [facility]. His current chemotherapy regimen causes constipation for which he takes senna and colace.        Emergency Department Course    In the ED, vital signs were Tmax 98.7F, HR  bpm, -111/66-78 mm Hg, RR 19-20, 97-98% RA.    He received 1g vancomycin X1, lactobacillus acidophilus.        Hospital Course History of Present Illness    68 year old male with COPD HTN, HLD, adenocarcinoma of the lung (on Keytruda and Olympta) at [facility] presents after failed outpatient treatment of cellulitis of the right foot. Two months ago the patient broke his 4th toe and metatarsal on the right and developed cellulitis The patient reports that his oncologist thought that the combination of a possible break in the dry skin of his foot combined with his lower extremity swelling secondary to amlodipine may have caused this. His podiatrist prescribed Augmentin 500 mg BID for 7 days. He completed the course and saw an improvement in his foot, but th erythema soon returned. His Oncologist soon prescribed Kephlex 500 mg TID. The patient took two doses and stopped the medication because it caused severe abdominal pain. This reaction is one he had many years ago with this medication. Last Saturday, the patient noticed that the erythema have moved up his ankle and erythema started to appear on the dorsum of the left foot. He called his Oncologist who prescribed Augmentin 500 mg TID. He tried the medication until Monday and noticed no improvement. His oncologist instructed him to go to the ED for IV antibiotics. His temperature is usually 97.5F, but last night his temperature elevated to 98.3F. He took two Tylenol tablets and this morning his temperature returned to normal. He denies chills, sick contacts, chest pain, cough, shortness of breath, nausea, vomiting or diarrhea.        Of note, the patient was diagnosed approximately one year ago with lung cancer. The patient was originally treated with four rounds of Keytruda, Olympta, and carboplatin. The carboplatin was stopped and he continues to be treated with the Keytruda and Olympta. He will be starting radiation therapy soon. He will also be partaking in a drug trial at [facility]. His current chemotherapy regimen causes constipation for which he takes senna and colace.        Emergency Department Course    In the ED, vital signs were Tmax 98.7F, HR  bpm, -111/66-78 mm Hg, RR 19-20, 97-98% RA.    He received 1g vancomycin x1, lactobacillus acidophilus.        Hospital Course    Patient was treated with vancomycin and ceftriaxone for the suspected cellulitis. Hematology/Oncology was consulted due to the patient's active lung cancer and chemotherapy status. It was postulated that the cellulitis could be an immune reaction to recent chemotherapy; they also recommended an Infectious Disease and Dermatology consult. Dermatology and Infectious Disease agreed that the lower extremity findings were unlikely to be cellulitis, but more likely due to amlodipine-induced lower edema and reaction from cancer therapy. Dermatology recommended a steroid cream and ID recommended discontinuation of the antibiotics. With the clobetasol, the erythema resolved but patient reported worse pain; lidocaine patches were prescribed to control the pain. Hospital course complicated by acute onset of arrhythmia determined to be AFib/Aflutter; patient was rate controlled on diltiazem, metoprolol, and digoxin. Patient was transferred to telemetry floor for monitoring. He underwent an echo which demonstrated EF 60-65% with grossly normal function. Cardiology was consulted and recommended continuation of the aforementioned antiarrhythmics. His heart rate was eventually controlled. His blood pressure was soft, so diltiazem was discontinued; the patient's heart rate remained stable on the metoprolol and digoxin. The patient was advised to undergo anticoagulation, but he wanted to await the input of his outpatient oncologist, Dr. Milka Rodgers. She was contacted and agreed that he should be on anticoagulation. The patient was amenable and was started on Eliquis.         On the day of discharge, the patient was evaluated to be clinically stable; he was assessed to be an appropriate candidate for discharge. 68 year old male with COPD HTN, HLD, adenocarcinoma of the lung (on Keytruda and Olympta) at [facility] presented after failed outpatient treatment of cellulitis of the right foot. Two months ago the patient broke his 4th toe and metatarsal on the right and developed cellulitis The patient reports that his oncologist thought that the combination of a possible break in the dry skin of his foot combined with his lower extremity swelling secondary to amlodipine may have caused this. His podiatrist prescribed Augmentin 500 mg BID for 7 days. He completed the course and saw an improvement in his foot, but th erythema soon returned. His Oncologist soon prescribed Kephlex 500 mg TID. The patient took two doses and stopped the medication because it caused severe abdominal pain. This reaction is one he had many years ago with this medication. Last Saturday, the patient noticed that the erythema have moved up his ankle and erythema started to appear on the dorsum of the left foot. He called his Oncologist who prescribed Augmentin 500 mg TID. He tried the medication until Monday and noticed no improvement. His oncologist instructed him to go to the ED for IV antibiotics. His temperature is usually 97.5F, but last night his temperature elevated to 98.3F. He took two Tylenol tablets and this morning his temperature returned to normal.         Patient was diagnosed approximately one year ago with lung cancer. The patient was originally treated with four rounds of Keytruda, Olympta, and carboplatin. The carboplatin was stopped and he continues to be treated with the Keytruda and Olympta. He will be starting radiation therapy soon. He will also be partaking in a drug trial at [facility]. His current chemotherapy regimen causes constipation for which he takes senna and colace.        Emergency Department Course    In the ED, vital signs were Tmax 98.7F, HR  bpm, -111/66-78 mm Hg, RR 19-20, 97-98% RA.    He received 1g vancomycin x1, lactobacillus acidophilus.        Hospital Course    Patient was treated with vancomycin and ceftriaxone to cover presumed MRSA and gram negative bacterial infection. Hematology/Oncology was consulted due to the patient's active lung cancer and chemotherapy status. It was postulated that the cellulitis could be an immune reaction to recent chemotherapy; they also recommended an Infectious Disease and Dermatology consult.         Dermatology and Infectious Disease agreed that the lower extremity findings were unlikely to be cellulitis, but more likely due to amlodipine-induced lower edema and reaction from cancer therapy. Dermatology recommended a steroid cream and ID recommended discontinuation of the antibiotics. With the clobetasol, the erythema resolved but patient reported worse pain; lidocaine patches were prescribed to control the pain.         Hospital course complicated by acute onset of arrhythmia determined to be AFib/Aflutter; patient was rate controlled on diltiazem, metoprolol, and digoxin. Patient was transferred to telemetry floor for monitoring. He underwent an echo which demonstrated EF 60-65% with grossly normal function. Cardiology was consulted. HR improved, became bradycardic, Cardizem d/manuel.         The patient was advised to undergo anticoagulation, but he wanted to await the input of his outpatient oncologist, Dr. Milka Rodgers. She was contacted and agreed that he should be on anticoagulation. The patient was amenable and was started on Eliquis.         Discharged on above meds w f/u. 	        Diagnoses: Cellulitis; Lung cancer; Atrial fibrillation w RVR; Leukopenia; Hyponatremia; Dehydration; COPD; Moderate prot angelina malnutrition 68 year old male with COPD HTN, HLD, adenocarcinoma of the lung (on Keytruda and Olympta) at [facility] presented after failed outpatient treatment of cellulitis of the right foot. Two months ago the patient broke his 4th toe and metatarsal on the right and developed cellulitis The patient reports that his oncologist thought that the combination of a possible break in the dry skin of his foot combined with his lower extremity swelling secondary to amlodipine may have caused this. His podiatrist prescribed Augmentin 500 mg BID for 7 days. He completed the course and saw an improvement in his foot, but th erythema soon returned. His Oncologist soon prescribed Kephlex 500 mg TID. The patient took two doses and stopped the medication because it caused severe abdominal pain. This reaction is one he had many years ago with this medication. Last Saturday, the patient noticed that the erythema have moved up his ankle and erythema started to appear on the dorsum of the left foot. He called his Oncologist who prescribed Augmentin 500 mg TID. He tried the medication until Monday and noticed no improvement. His oncologist instructed him to go to the ED for IV antibiotics. His temperature is usually 97.5F, but last night his temperature elevated to 98.3F. He took two Tylenol tablets and this morning his temperature returned to normal.         Patient was diagnosed approximately one year ago with lung cancer. The patient was originally treated with four rounds of Keytruda, Olympta, and carboplatin. The carboplatin was stopped and he continues to be treated with the Keytruda and Olympta. He will be starting radiation therapy soon. He will also be partaking in a drug trial at [facility]. His current chemotherapy regimen causes constipation for which he takes senna and colace.        Emergency Department Course    In the ED, vital signs were Tmax 98.7F, HR  bpm, -111/66-78 mm Hg, RR 19-20, 97-98% RA.    He received 1g vancomycin x1, lactobacillus acidophilus.        Hospital Course    Patient was treated with vancomycin and ceftriaxone to cover presumed MRSA and gram negative bacterial infection. Hematology/Oncology was consulted due to the patient's active lung cancer and chemotherapy status. It was postulated that the cellulitis could be an immune reaction to recent chemotherapy; they also recommended an Infectious Disease and Dermatology consult.         Dermatology and Infectious Disease agreed that the lower extremity findings were unlikely to be cellulitis, but more likely due to amlodipine-induced lower edema and reaction from cancer therapy. Dermatology recommended a steroid cream and ID recommended discontinuation of the antibiotics. With the clobetasol, the erythema resolved but patient reported worse pain; lidocaine patches were prescribed to control the pain.         Hospital course complicated by acute onset of afib/flutter w RVR, transferred to [facility]; patient was rate controlled on diltiazem, metoprolol, and digoxin. He underwent an echo which demonstrated EF 60-65% with grossly normal function. Cardiology was consulted. HR improved, became bradycardic, Cardizem d/manuel.         The patient was advised to undergo anticoagulation, but he wanted to await the input of his outpatient oncologist, Dr. Milka Rodgers. She was contacted and agreed that he should be on anticoagulation. The patient was amenable and was started on Eliquis.         Discharged on above meds w f/u. 	        Diagnoses: Cellulitis; Lung cancer; Atrial fibrillation w RVR; Leukopenia; Hyponatremia; Dehydration; COPD; Moderate prot angelina malnutrition 68 year old male with COPD HTN, HLD, adenocarcinoma of the lung (on Keytruda and Olympta) at [facility] presented after failed outpatient treatment of cellulitis of the right foot. Two months ago the patient broke his 4th toe and metatarsal on the right and developed cellulitis The patient reports that his oncologist thought that the combination of a possible break in the dry skin of his foot combined with his lower extremity swelling secondary to amlodipine may have caused this. His podiatrist prescribed Augmentin 500 mg BID for 7 days. He completed the course and saw an improvement in his foot, but th erythema soon returned. His Oncologist soon prescribed Kephlex 500 mg TID. The patient took two doses and stopped the medication because it caused severe abdominal pain. This reaction is one he had many years ago with this medication. Last Saturday, the patient noticed that the erythema have moved up his ankle and erythema started to appear on the dorsum of the left foot. He called his Oncologist who prescribed Augmentin 500 mg TID. He tried the medication until Monday and noticed no improvement. His oncologist instructed him to go to the ED for IV antibiotics. His temperature is usually 97.5F, but last night his temperature elevated to 98.3F. He took two Tylenol tablets and this morning his temperature returned to normal.         Patient was diagnosed approximately one year ago with lung cancer. The patient was originally treated with four rounds of Keytruda, Olympta, and carboplatin. The carboplatin was stopped and he continues to be treated with the Keytruda and Alimta. He will be starting radiation therapy soon. He will also be partaking in a drug trial at [facility]. His current chemotherapy regimen causes constipation for which he takes senna and colace.        Emergency Department Course    In the ED, vital signs were Tmax 98.7F, HR  bpm, -111/66-78 mm Hg, RR 19-20, 97-98% RA.    He received 1g vancomycin x1, lactobacillus acidophilus.        Hospital Course    Patient was treated with vancomycin and ceftriaxone to cover presumed MRSA and gram negative bacterial infection. Hematology/Oncology was consulted due to the patient's active lung cancer and chemotherapy status. It was postulated that the cellulitis could be an immune reaction to recent chemotherapy; they also recommended an Infectious Disease and Dermatology consult.         Dermatology and Infectious Disease agreed that the lower extremity findings were unlikely to be cellulitis, but more likely due to amlodipine-induced lower edema and reaction from cancer therapy. Dermatology recommended a steroid cream and ID recommended discontinuation of the antibiotics. With the clobetasol, the erythema resolved but patient reported worse pain; lidocaine patches were prescribed to control the pain.         Hospital course complicated by acute onset of afib/flutter w RVR, transferred to [facility]; patient was rate controlled on diltiazem, metoprolol, and digoxin. He underwent an echo which demonstrated EF 60-65% with grossly normal function. Cardiology was consulted. HR improved, became bradycardic, Cardizem d/manuel.         The patient was advised to undergo anticoagulation, but he wanted to await the input of his outpatient oncologist, Dr. Milka Rodgers. She was contacted and agreed that he should be on anticoagulation. The patient was amenable and was started on Eliquis.         Discharged on above meds w f/u. 	        Diagnoses: Cellulitis; Lung cancer; Lymph node mets; Atrial fibrillation w RVR; Leukopenia; Hyponatremia; Dehydration; COPD; Moderate prot angelina malnutrition

## 2019-10-25 NOTE — PROVIDER CONTACT NOTE (OTHER) - SITUATION
As per telemetry pt had 8 beats WCT. Incidentally found to hypotensive upon assessment.
Patient's BP 92/60, due for diltiazem with hold parameters for BP 90/60
Pt had 5 sec pause on tele monitor
Pt was transferred to [address] [place] [address] for n/o tachycardia, found to be in 2:1 AFL. MAR, Dr. Kai Mae initiated RRT for help treating arrhythmia.
Pt's HR is 147

## 2019-10-26 LAB
CULTURE RESULTS: SIGNIFICANT CHANGE UP
CULTURE RESULTS: SIGNIFICANT CHANGE UP
SPECIMEN SOURCE: SIGNIFICANT CHANGE UP
SPECIMEN SOURCE: SIGNIFICANT CHANGE UP

## 2019-10-28 ENCOUNTER — OUTPATIENT (OUTPATIENT)
Dept: OUTPATIENT SERVICES | Facility: HOSPITAL | Age: 69
LOS: 1 days | Discharge: HOME | End: 2019-10-28

## 2019-10-28 DIAGNOSIS — C34.90 MALIGNANT NEOPLASM OF UNSPECIFIED PART OF UNSPECIFIED BRONCHUS OR LUNG: ICD-10-CM

## 2019-10-28 DIAGNOSIS — Z98.890 OTHER SPECIFIED POSTPROCEDURAL STATES: Chronic | ICD-10-CM

## 2019-10-28 PROBLEM — Z57.5: Chronic | Status: ACTIVE | Noted: 2019-02-25

## 2019-10-28 PROBLEM — T14.8XXA OTHER INJURY OF UNSPECIFIED BODY REGION, INITIAL ENCOUNTER: Chronic | Status: ACTIVE | Noted: 2019-02-25

## 2019-10-30 ENCOUNTER — INBOUND DOCUMENT (OUTPATIENT)
Age: 69
End: 2019-10-30

## 2019-10-31 ENCOUNTER — NON-APPOINTMENT (OUTPATIENT)
Age: 69
End: 2019-10-31

## 2019-10-31 ENCOUNTER — APPOINTMENT (OUTPATIENT)
Dept: CARDIOLOGY | Facility: CLINIC | Age: 69
End: 2019-10-31
Payer: MEDICARE

## 2019-10-31 VITALS
BODY MASS INDEX: 19.7 KG/M2 | SYSTOLIC BLOOD PRESSURE: 114 MMHG | OXYGEN SATURATION: 98 % | DIASTOLIC BLOOD PRESSURE: 74 MMHG | RESPIRATION RATE: 16 BRPM | WEIGHT: 130 LBS | TEMPERATURE: 98.6 F | HEIGHT: 68 IN | HEART RATE: 77 BPM

## 2019-10-31 DIAGNOSIS — Z00.00 ENCOUNTER FOR GENERAL ADULT MEDICAL EXAMINATION W/OUT ABNORMAL FINDINGS: ICD-10-CM

## 2019-10-31 PROCEDURE — 99204 OFFICE O/P NEW MOD 45 MIN: CPT

## 2019-10-31 PROCEDURE — 93000 ELECTROCARDIOGRAM COMPLETE: CPT

## 2019-10-31 RX ORDER — KETOCONAZOLE 20.5 MG/ML
2 SHAMPOO, SUSPENSION TOPICAL
Qty: 1 | Refills: 6 | Status: DISCONTINUED | COMMUNITY
Start: 2019-05-22 | End: 2019-10-31

## 2019-10-31 RX ORDER — CEPHALEXIN 500 MG/1
500 CAPSULE ORAL 3 TIMES DAILY
Qty: 21 | Refills: 0 | Status: DISCONTINUED | COMMUNITY
Start: 2019-10-15 | End: 2019-10-31

## 2019-10-31 RX ORDER — QUINAPRIL HYDROCHLORIDE 40 MG/1
40 TABLET, FILM COATED ORAL DAILY
Refills: 0 | Status: DISCONTINUED | COMMUNITY
End: 2019-10-31

## 2019-10-31 RX ORDER — AMOXICILLIN AND CLAVULANATE POTASSIUM 500; 125 MG/1; MG/1
500-125 TABLET, FILM COATED ORAL 3 TIMES DAILY
Qty: 21 | Refills: 0 | Status: DISCONTINUED | COMMUNITY
Start: 2019-10-17 | End: 2019-10-31

## 2019-10-31 RX ORDER — AMLODIPINE BESYLATE 10 MG/1
10 TABLET ORAL
Refills: 0 | Status: DISCONTINUED | COMMUNITY
End: 2019-10-31

## 2019-11-04 NOTE — REASON FOR VISIT
[FreeTextEntry1] : Pt presents following up from recent hospital discharge for a fib/a flutter. He was admitted to [facility] originally for cellulitis and then was found to have a fib up to 160s. \par The patient is here for follow-up of atrial fibrillation and currently they feel well with no episode of palpitations.  The patient states they are reliably taking the anticoagulation medicine and are not having any signs of bleeding they deny any dizziness headaches nosebleeds or black tarry stools \par Pt w/ recent diagnosis of stage 4 Lung Ca in January 2019- followed by Dr. Rodgers. Starts radiation on Tuesday. Last chemo tx was October 11th.\par

## 2019-11-04 NOTE — PHYSICAL EXAM
[General Appearance - Well Developed] : well developed [Normal Appearance] : normal appearance [Well Groomed] : well groomed [General Appearance - Well Nourished] : well nourished [No Deformities] : no deformities [General Appearance - In No Acute Distress] : no acute distress [Normal Conjunctiva] : the conjunctiva exhibited no abnormalities [Eyelids - No Xanthelasma] : the eyelids demonstrated no xanthelasmas [Normal Oral Mucosa] : normal oral mucosa [No Oral Pallor] : no oral pallor [No Oral Cyanosis] : no oral cyanosis [Normal Jugular Venous A Waves Present] : normal jugular venous A waves present [Normal Jugular Venous V Waves Present] : normal jugular venous V waves present [No Jugular Venous Martinez A Waves] : no jugular venous martinez A waves [Heart Rate And Rhythm] : heart rate and rhythm were normal [Heart Sounds] : normal S1 and S2 [Murmurs] : no murmurs present [Respiration, Rhythm And Depth] : normal respiratory rhythm and effort [Exaggerated Use Of Accessory Muscles For Inspiration] : no accessory muscle use [Auscultation Breath Sounds / Voice Sounds] : lungs were clear to auscultation bilaterally [Abdomen Soft] : soft [Abdomen Tenderness] : non-tender [Abdomen Mass (___ Cm)] : no abdominal mass palpated [Abnormal Walk] : normal gait [Gait - Sufficient For Exercise Testing] : the gait was sufficient for exercise testing [Skin Color & Pigmentation] : normal skin color and pigmentation [] : no rash [No Venous Stasis] : no venous stasis [Skin Lesions] : no skin lesions [No Skin Ulcers] : no skin ulcer [No Xanthoma] : no  xanthoma was observed [Oriented To Time, Place, And Person] : oriented to person, place, and time [Affect] : the affect was normal [Mood] : the mood was normal [No Anxiety] : not feeling anxious [FreeTextEntry1] : +3 edema to b/l lower extremities

## 2019-11-05 ENCOUNTER — RESULT REVIEW (OUTPATIENT)
Age: 69
End: 2019-11-05

## 2019-11-05 ENCOUNTER — LABORATORY RESULT (OUTPATIENT)
Age: 69
End: 2019-11-05

## 2019-11-05 ENCOUNTER — APPOINTMENT (OUTPATIENT)
Dept: HEMATOLOGY ONCOLOGY | Facility: CLINIC | Age: 69
End: 2019-11-05
Payer: MEDICARE

## 2019-11-05 ENCOUNTER — APPOINTMENT (OUTPATIENT)
Dept: INFUSION THERAPY | Facility: HOSPITAL | Age: 69
End: 2019-11-05
Payer: MEDICARE

## 2019-11-05 VITALS
WEIGHT: 132.28 LBS | SYSTOLIC BLOOD PRESSURE: 144 MMHG | HEART RATE: 61 BPM | OXYGEN SATURATION: 97 % | RESPIRATION RATE: 16 BRPM | BODY MASS INDEX: 20.11 KG/M2 | DIASTOLIC BLOOD PRESSURE: 81 MMHG | TEMPERATURE: 97.3 F

## 2019-11-05 LAB
BASOPHILS # BLD AUTO: 0 K/UL — SIGNIFICANT CHANGE UP (ref 0–0.2)
EOSINOPHIL # BLD AUTO: 0 K/UL — SIGNIFICANT CHANGE UP (ref 0–0.5)
HCT VFR BLD CALC: 34 % — LOW (ref 39–50)
HGB BLD-MCNC: 11.3 G/DL — LOW (ref 13–17)
LYMPHOCYTES # BLD AUTO: 1.6 K/UL — SIGNIFICANT CHANGE UP (ref 1–3.3)
LYMPHOCYTES # BLD AUTO: 17 % — SIGNIFICANT CHANGE UP (ref 13–44)
MCHC RBC-ENTMCNC: 32.5 PG — SIGNIFICANT CHANGE UP (ref 27–34)
MCHC RBC-ENTMCNC: 33.3 G/DL — SIGNIFICANT CHANGE UP (ref 32–36)
MCV RBC AUTO: 97.5 FL — SIGNIFICANT CHANGE UP (ref 80–100)
MONOCYTES # BLD AUTO: 1.6 K/UL — HIGH (ref 0–0.9)
MONOCYTES NFR BLD AUTO: 13 % — SIGNIFICANT CHANGE UP (ref 2–14)
NEUTROPHILS # BLD AUTO: 7.2 K/UL — SIGNIFICANT CHANGE UP (ref 1.8–7.4)
NEUTROPHILS NFR BLD AUTO: 68 % — SIGNIFICANT CHANGE UP (ref 43–77)
NEUTS BAND # BLD: 2 % — SIGNIFICANT CHANGE UP (ref 0–8)
PLAT MORPH BLD: NORMAL — SIGNIFICANT CHANGE UP
PLATELET # BLD AUTO: 546 K/UL — HIGH (ref 150–400)
RBC # BLD: 3.49 M/UL — LOW (ref 4.2–5.8)
RBC # FLD: 13.5 % — SIGNIFICANT CHANGE UP (ref 10.3–14.5)
RBC BLD AUTO: SIGNIFICANT CHANGE UP
WBC # BLD: 10.4 K/UL — SIGNIFICANT CHANGE UP (ref 3.8–10.5)
WBC # FLD AUTO: 10.4 K/UL — SIGNIFICANT CHANGE UP (ref 3.8–10.5)

## 2019-11-05 PROCEDURE — G0009: CPT

## 2019-11-05 PROCEDURE — 90732 PPSV23 VACC 2 YRS+ SUBQ/IM: CPT

## 2019-11-05 PROCEDURE — 99215 OFFICE O/P EST HI 40 MIN: CPT

## 2019-11-05 NOTE — ASSESSMENT
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 PDL1 unknown. \par - Given excellent PS, unknown PDL1, and absence of targetable mutations, patient was started on carbo/pemetrexed and Keytruda triplet combo as per Keynote 189 study. \par - Pt  has been on maintenance with Keytruda and Alimta which he is tolerating well. No obvious irAEs but could LE edema and redness be an irAE from Keytruda. Will have him follow up with Dr. Hayes\par - Recent PET/CT notable for oligoprogressive disease involving the SHENA lung and R adrenal gland, but stable or improved at other sites. Starting RT today, while on RT, would hold Keytruda. \par - OV in 3 weeks.

## 2019-11-05 NOTE — PHYSICAL EXAM
[Fully active, able to carry on all pre-disease performance without restriction] : Status 0 - Fully active, able to carry on all pre-disease performance without restriction [Thin] : thin [Normal] : affect appropriate [de-identified] : No acute distress [de-identified] : No spinal TTP [de-identified] : + B/L pedal edema, rt>left [de-identified] : + left foot erythema (blanchable), No open wounds seen, No erythema on L foot

## 2019-11-05 NOTE — HISTORY OF PRESENT ILLNESS
[de-identified] : Mr. Soto is a pleasant 67 yo m with recently discovered lung mass here for consult visit. \par Pt who is very active, prior athlete, ex-, with hx of anxiety, HTN, ex-smoker (quit 2016), prior marijuana use, some occupational exposure to toxic chemicals, developed dyspnea in 2016, was diagnosed with COPD and is under the care of pulmonologists. Around end of September, developed hoarseness of voice which was thought to be laryngitis- was sent to ENT by domestic partner who is a speech pathologists. He was given medrol pack which did not help and then referred for CT chest which showed left upper lobe mass measuring 6.6 cm as well as hilar/mediastinal adenopathy and b/l nodules consistent with mets. He also had MRI brain without any contrast in November done for headache showed microvascular changes. Headaches have since resolved. Since last visit,  has seen Dr. Hernandez who discussed bronch with him but he declined. Hence, she referred him for CT guided bx of lung mass, which he had done a couple of weeks ago. 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 NGS and PDL1 are pending. He also saw Dr. Washington at [facility] for another opinion. 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 Pt was started on Carbo/pemetrexed and Keytruda. He has completed 3 cycles of this. He says he has been having stomach pains along with constipation. He took Hyoscine as was recommended by his GI which did not help. He then started taking some herbal meds for constipation which helped. Wife has been increasing fiber which is causing increased gas. [facility] has had recent scans. Presents for results. [facility] saw dermatologist and all findings benign \par \par 5/8/19: Pt here for follow up. He sustained a fall while trying to climb a ladder a few days ago, hurt his right ribs. He went to urgent care and had rib series, which showed no fx but a possible opacity related to PNA or atelectasis or mass. The pain has resolved and he has no new complaints. \par \par 5/22/19: doing well with no irAEs. Rib pain improved but still present with certain postures. Denies any other symptoms. \par \par 6/11/19: No new complaints. Feeling completely back to normal. Has some abd queasiness for a couple of days after chemo associated with some constipation. Denies any diarrhea. \par Had CT scans since last visit- excellent [place]. Report pasted below\par Since 4/1/2019: \par Left upper lobe mass has decreased in size, as has left hilar adenopathy. \par Multiple bilateral subcentimeter pulmonary nodules are unchanged. \par No acute findings. \par \par 7/23/19: Pt states he is feeling "great"! Planning to go on a vacation. \par \par 8/13/19: Pt had some green sputum and low grade temp last week (100.4), saw PCP- lungs were clear and his PCP did not feel that abx were indicated. Pt is here for infusion. Feels better already. \par \par 9/17/19: Had an accident at home- broke some toes on rt. Has episodes of sore throat- and some cough. He was prescribed abx by Randee. He has had CT scans since last visit.\par \par 9/24/19: Pt returned after having a consultation with Dr. Blanchard for discussion about possible radiation. Mr. Soto is slightly more anxious about treatment but is determined to "do what he has to do". \par \par 10/15/19: Patient presents today for follow-up. Since his last visit, he has completed simulation for radiation therapy planning. He reports that he has been generally well since his last appointment. He does report that he has had R foot redness recently which has been bothering him. He states that the area is slightly painful, but denies any associated oozing/discharge. No recent fevers or chills. He does state that he injured his R foot ~ 8 weeks prior to this starting and he is unsure if it is related. Patient follows with a Podiatrist who had prescribed him an antibiotic for this (pt believes he was given Levaquin) and he explains that the redness initially improved, but it has since returned. On ROS, patient denies any recent headache, chest pain, shortness of breath, vomiting, or abdominal pain.\par \par 11/5/19: As noted above, pt was started on abx for left LE cellulitis but outpatient treatment did not work and pt called reporting that his LE symptoms had not improved. He was sent to the hospital on 10/22 where he was found to have asymptomatic afib. Cardioversion was considered but pt refused and he was started on meds including Eliquis. HTN meds were adjusted. He received IV abx as well but as per ID, this is unlikely cellultis and stopped abx. He is scheduled to start RT today.

## 2019-11-05 NOTE — REVIEW OF SYSTEMS
[Fever] : no fever [Chills] : no chills [Eye Pain] : no eye pain [Red Eyes] : eyes not red [Dysphagia] : no dysphagia [Odynophagia] : no odynophagia [Chest Pain] : no chest pain [Palpitations] : no palpitations [Lower Ext Edema] : lower extremity edema [Shortness Of Breath] : no shortness of breath [Cough] : no cough [Abdominal Pain] : no abdominal pain [Vomiting] : no vomiting [Joint Stiffness] : no joint stiffness [Skin Wound] : no skin wound [Confused] : no confusion [Dizziness] : no dizziness [Negative] : Allergic/Immunologic [FreeTextEntry5] : as above [FreeTextEntry9] : + Mild R foot pain [de-identified] : mild foot redness rt>left

## 2019-11-06 DIAGNOSIS — Z51.11 ENCOUNTER FOR ANTINEOPLASTIC CHEMOTHERAPY: ICD-10-CM

## 2019-11-06 DIAGNOSIS — R11.2 NAUSEA WITH VOMITING, UNSPECIFIED: ICD-10-CM

## 2019-11-07 VITALS
RESPIRATION RATE: 18 BRPM | BODY MASS INDEX: 20.51 KG/M2 | HEART RATE: 54 BPM | TEMPERATURE: 36.4 F | OXYGEN SATURATION: 95 % | WEIGHT: 134.92 LBS | DIASTOLIC BLOOD PRESSURE: 84 MMHG | SYSTOLIC BLOOD PRESSURE: 135 MMHG

## 2019-11-07 NOTE — REVIEW OF SYSTEMS
[Constipation: Grade 0] : Constipation: Grade 0 [Diarrhea: Grade 0] : Diarrhea: Grade 0 [Dysphagia: Grade 0] : Dysphagia: Grade 0 [Nausea: Grade 0] : Nausea: Grade 0 [Vomiting: Grade 0] : Vomiting: Grade 0 [Fatigue: Grade 0] : Fatigue: Grade 0 [Cough: Grade 0] : Cough: Grade 0 [Dyspnea: Grade 1 - Shortness of breath with moderate exertion] : Dyspnea: Grade 1 - Shortness of breath with moderate exertion [Dermatitis Radiation: Grade 0] : Dermatitis Radiation: Grade 0

## 2019-11-07 NOTE — PHYSICAL EXAM
Nursing Note by Carolina Kerns RN at 03/26/17 09:13 AM     Author:  Carolina Kerns RN Service:  (none) Author Type:  Registered Nurse     Filed:  03/26/17 09:13 AM Encounter Date:  3/26/2017 Status:  Signed     :  Carolina Kerns RN (Registered Nurse)            Patient was triaged after name and birth date were verified. Wait time explained to patient and patient was returned to waiting room in stable condition until patient room was available.    Electronically Signed by:    Carolina Kerns RN , 3/26/2017   Last visit with [facility]AMP[facility] was on No match found  [facility]  Match done based on reference date of today 3/26/17  Name and birthdate verified.  Dakota Martinez was offered treatment for pain control:[HK1.1T] Yes.  Patient refused comfort measures to reduce pain.[HK1.1M]    Last visit with RICO CAMPOVERDE was on No match found  Last visit with [facility] was on 02/06/2016 at  3:40 PM in [facility]  Match done based on reference date of today 3/26/17[HK1.1T]            Revision History        User Key Date/Time User Provider Type Action    > HK1.1 03/26/17 09:13 AM Carolina Kerns RN Registered Nurse Sign    M - Manual, T - Template             [Normal] : normal skin color and pigmentation and no rash

## 2019-11-12 PROCEDURE — 77435 SBRT MANAGEMENT: CPT

## 2019-11-12 PROCEDURE — 93970 EXTREMITY STUDY: CPT

## 2019-11-12 PROCEDURE — 80048 BASIC METABOLIC PNL TOTAL CA: CPT

## 2019-11-12 PROCEDURE — 73630 X-RAY EXAM OF FOOT: CPT

## 2019-11-12 PROCEDURE — 86803 HEPATITIS C AB TEST: CPT

## 2019-11-12 PROCEDURE — 85652 RBC SED RATE AUTOMATED: CPT

## 2019-11-12 PROCEDURE — 93306 TTE W/DOPPLER COMPLETE: CPT

## 2019-11-12 PROCEDURE — 87040 BLOOD CULTURE FOR BACTERIA: CPT

## 2019-11-12 PROCEDURE — 84100 ASSAY OF PHOSPHORUS: CPT

## 2019-11-12 PROCEDURE — 96374 THER/PROPH/DIAG INJ IV PUSH: CPT

## 2019-11-12 PROCEDURE — 85027 COMPLETE CBC AUTOMATED: CPT

## 2019-11-12 PROCEDURE — 80202 ASSAY OF VANCOMYCIN: CPT

## 2019-11-12 PROCEDURE — 93005 ELECTROCARDIOGRAM TRACING: CPT

## 2019-11-12 PROCEDURE — 83735 ASSAY OF MAGNESIUM: CPT

## 2019-11-12 PROCEDURE — 82962 GLUCOSE BLOOD TEST: CPT

## 2019-11-12 PROCEDURE — 86140 C-REACTIVE PROTEIN: CPT

## 2019-11-12 PROCEDURE — 80053 COMPREHEN METABOLIC PANEL: CPT

## 2019-11-12 PROCEDURE — 99285 EMERGENCY DEPT VISIT HI MDM: CPT | Mod: 25

## 2019-11-12 PROCEDURE — 94640 AIRWAY INHALATION TREATMENT: CPT

## 2019-11-13 ENCOUNTER — NON-APPOINTMENT (OUTPATIENT)
Age: 69
End: 2019-11-13

## 2019-11-13 ENCOUNTER — APPOINTMENT (OUTPATIENT)
Dept: CARDIOLOGY | Facility: CLINIC | Age: 69
End: 2019-11-13
Payer: MEDICARE

## 2019-11-13 VITALS
HEIGHT: 68 IN | SYSTOLIC BLOOD PRESSURE: 112 MMHG | BODY MASS INDEX: 20.31 KG/M2 | DIASTOLIC BLOOD PRESSURE: 80 MMHG | RESPIRATION RATE: 18 BRPM | OXYGEN SATURATION: 97 % | HEART RATE: 58 BPM | TEMPERATURE: 98.6 F | WEIGHT: 134 LBS

## 2019-11-13 DIAGNOSIS — Z85.118 PERSONAL HISTORY OF OTHER MALIGNANT NEOPLASM OF BRONCHUS AND LUNG: ICD-10-CM

## 2019-11-13 DIAGNOSIS — E88.09 OTHER DISORDERS OF PLASMA-PROTEIN METABOLISM, NOT ELSEWHERE CLASSIFIED: ICD-10-CM

## 2019-11-13 PROCEDURE — 93000 ELECTROCARDIOGRAM COMPLETE: CPT

## 2019-11-13 RX ORDER — FUROSEMIDE 20 MG/1
20 TABLET ORAL
Qty: 14 | Refills: 0 | Status: DISCONTINUED | COMMUNITY
Start: 2019-10-31 | End: 2019-11-13

## 2019-11-13 NOTE — PHYSICAL EXAM
[Normal Appearance] : normal appearance [Well Groomed] : well groomed [General Appearance - Well Developed] : well developed [General Appearance - In No Acute Distress] : no acute distress [General Appearance - Well Nourished] : well nourished [No Deformities] : no deformities [Normal Oral Mucosa] : normal oral mucosa [Eyelids - No Xanthelasma] : the eyelids demonstrated no xanthelasmas [Normal Conjunctiva] : the conjunctiva exhibited no abnormalities [No Oral Cyanosis] : no oral cyanosis [No Oral Pallor] : no oral pallor [Normal Jugular Venous V Waves Present] : normal jugular venous V waves present [No Jugular Venous Martinez A Waves] : no jugular venous martinez A waves [Normal Jugular Venous A Waves Present] : normal jugular venous A waves present [Heart Sounds] : normal S1 and S2 [Heart Rate And Rhythm] : heart rate and rhythm were normal [Abdomen Soft] : soft [Murmurs] : no murmurs present [Abdomen Tenderness] : non-tender [Abnormal Walk] : normal gait [Abdomen Mass (___ Cm)] : no abdominal mass palpated [Gait - Sufficient For Exercise Testing] : the gait was sufficient for exercise testing [Skin Color & Pigmentation] : normal skin color and pigmentation [] : no rash [No Venous Stasis] : no venous stasis [Skin Lesions] : no skin lesions [No Skin Ulcers] : no skin ulcer [No Xanthoma] : no  xanthoma was observed [Affect] : the affect was normal [Oriented To Time, Place, And Person] : oriented to person, place, and time [Mood] : the mood was normal [No Anxiety] : not feeling anxious [FreeTextEntry1] : dry skin to b/l lower extremities

## 2019-11-13 NOTE — REASON FOR VISIT
[FreeTextEntry1] : The patient is also here for follow-up of atrial fibrillation and currently they feel well with an occasional episode of palpitations.  The patient states they are reliably taking the anticoagulation medicine and are not having any signs of bleeding they deny any dizziness headaches nosebleeds or black tarry stools \par Pt w/ stage four lung cancer, fifth dose of radiation was yesterday. \par Pt is part of a drug trial for lung cancer- w/ Dr. WashingtonBdhdpl-[phone].\par The patient presents today for evaluation of complaints of leg Edema. They admit to high dietary sodium intake recently. The patient denies any chest pain, shortness of breath, PND. Orthopnea, dizziness, nausea, vomiting, lightheadedness, abdominal pain, or fever.\par The patient here for evaluation of high blood pressure. Patient is currently tolerating the current antihypertensive regime and they deny headaches, stiff neck, visual changes, or PND. The patient has been trying to stay on a low-sodium diet.\par \par

## 2019-11-14 ENCOUNTER — NON-APPOINTMENT (OUTPATIENT)
Age: 69
End: 2019-11-14

## 2019-11-14 LAB
ALBUMIN SERPL ELPH-MCNC: 2.9 G/DL
ALP BLD-CCNC: 93 U/L
ALT SERPL-CCNC: 34 U/L
ANION GAP SERPL CALC-SCNC: 12 MMOL/L
AST SERPL-CCNC: 32 U/L
BILIRUB SERPL-MCNC: 0.5 MG/DL
BUN SERPL-MCNC: 18 MG/DL
CALCIUM SERPL-MCNC: 9.4 MG/DL
CHLORIDE SERPL-SCNC: 100 MMOL/L
CO2 SERPL-SCNC: 29 MMOL/L
CREAT SERPL-MCNC: 1.01 MG/DL
GLUCOSE SERPL-MCNC: 95 MG/DL
NT-PROBNP SERPL-MCNC: 561 PG/ML
POTASSIUM SERPL-SCNC: 4.7 MMOL/L
PROT SERPL-MCNC: 6.3 G/DL
SODIUM SERPL-SCNC: 141 MMOL/L

## 2019-11-14 PROCEDURE — 93224 XTRNL ECG REC UP TO 48 HRS: CPT

## 2019-11-15 DIAGNOSIS — I49.3 VENTRICULAR PREMATURE DEPOLARIZATION: ICD-10-CM

## 2019-11-22 ENCOUNTER — APPOINTMENT (OUTPATIENT)
Dept: CARDIOLOGY | Facility: CLINIC | Age: 69
End: 2019-11-22

## 2019-11-26 ENCOUNTER — APPOINTMENT (OUTPATIENT)
Dept: HEMATOLOGY ONCOLOGY | Facility: CLINIC | Age: 69
End: 2019-11-26

## 2019-11-26 ENCOUNTER — APPOINTMENT (OUTPATIENT)
Dept: INFUSION THERAPY | Facility: HOSPITAL | Age: 69
End: 2019-11-26

## 2019-11-29 PROBLEM — R49.0 HOARSENESS OF VOICE: Status: ACTIVE | Noted: 2019-01-23

## 2019-11-29 NOTE — REVIEW OF SYSTEMS
[Fever] : no fever [Chills] : no chills [Eye Pain] : no eye pain [Red Eyes] : eyes not red [Dysphagia] : no dysphagia [Odynophagia] : no odynophagia [Chest Pain] : no chest pain [Palpitations] : no palpitations [Shortness Of Breath] : no shortness of breath [Cough] : no cough [Abdominal Pain] : no abdominal pain [Vomiting] : no vomiting [Joint Stiffness] : no joint stiffness [Skin Wound] : no skin wound [Confused] : no confusion [Dizziness] : no dizziness [de-identified] : + R foot redness [FreeTextEntry9] : + Mild R foot pain

## 2019-11-29 NOTE — PHYSICAL EXAM
[Fully active, able to carry on all pre-disease performance without restriction] : Status 0 - Fully active, able to carry on all pre-disease performance without restriction [Thin] : thin [Normal] : affect appropriate [de-identified] : No acute distress [de-identified] : No spinal TTP [de-identified] : + R foot erythema (blanchable), No open wounds seen, No erythema on L foot [de-identified] : + B/L pedal edema

## 2019-11-29 NOTE — ASSESSMENT
[FreeTextEntry1] : Patient is a 69 y/o M with a newly discovered lung mass\par \par - PET/CT showed uptake in b/l lung nodules and med LN in addition to known primary lung mass c/w metastatic disease.\par - NGS from peripheral blood was pos for KRAS mut. PDL1 unknown. \par - Given excellent PS, unknown PDL1, and absence of targetable mutations, patient was started on carbo/pemetrexed and Keytruda triplet combo as per Keynote 189 study. \par - Pt is currently on maintenance with Keytruda and Alimta which he is tolerating well. 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 Patient following with Rad Onc for possible SBRT for oligoprogression. Appreciate Rad Onc evaluation\par - Continue Alimta/Keytruda. Pt may seek options for clinical trial at [facility]\par - Patient endorses new R foot erythema which appears cellulitic in nature. He also has B/L pedal edema on exam. Prescription for Cephalexin was given. Will also check a CBC and B/L LE U/S.\par - OV in 3 weeks.

## 2019-11-29 NOTE — HISTORY OF PRESENT ILLNESS
[de-identified] : Mr. Soto is a pleasant 67 yo m with recently discovered lung mass here for consult visit. \par Pt who is very active, prior athlete, ex-, with hx of anxiety, HTN, ex-smoker (quit 2016), prior marijuana use, some occupational exposure to toxic chemicals, developed dyspnea in 2016, was diagnosed with COPD and is under the care of pulmonologists. Around end of September, developed hoarseness of voice which was thought to be laryngitis- was sent to ENT by domestic partner who is a speech pathologists. He was given medrol pack which did not help and then referred for CT chest which showed left upper lobe mass measuring 6.6 cm as well as hilar/mediastinal adenopathy and b/l nodules consistent with mets. He also had MRI brain without any contrast in November done for headache showed microvascular changes. Headaches have since resolved. Since last visit,  has seen Dr. Hernandez who discussed bronch with him but he declined. Hence, she referred him for CT guided bx of lung mass, which he had done a couple of weeks ago. 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 NGS and PDL1 are pending. He also saw Dr. Washington at [facility] for another opinion. Peripheral blood NGS was sent from [facility] and revealed KRAS mutation. Pt was started on Carbo/pemetrexed and Keytruda. He has completed 3 cycles of this. He says he has been having stomach pains along with constipation. He took Hyoscine as was recommended by his GI which did not help. He then started taking some herbal meds for constipation which helped. Wife has been increasing fiber which is causing increased gas. [facility] has had recent scans. Presents for results. [facility] saw dermatologist and all findings benign \par \par 5/8/19: Pt here for follow up. He sustained a fall while trying to climb a ladder a few days ago, hurt his right ribs. He went to urgent care and had rib series, which showed no fx but a possible opacity related to PNA or atelectasis or mass. The pain has resolved and he has no new complaints. \par \par 5/22/19: doing well with no irAEs. Rib pain improved but still present with certain postures. Denies any other symptoms. \par \par 6/11/19: No new complaints. Feeling completely back to normal. Has some abd queasiness for a couple of days after chemo associated with some constipation. Denies any diarrhea. \par Had CT scans since last visit- excellent [place]. Report pasted below\par Since 4/1/2019: \par Left upper lobe mass has decreased in size, as has left hilar adenopathy. \par Multiple bilateral subcentimeter pulmonary nodules are unchanged. \par No acute findings. \par \par 7/23/19: Pt states he is feeling "great"! Planning to go on a vacation. \par \par 8/13/19: Pt had some green sputum and low grade temp last week (100.4), saw PCP- lungs were clear and his PCP did not feel that abx were indicated. Pt is here for infusion. Feels better already. \par \par 9/17/19: Had an accident at home- broke some toes on rt. Has episodes of sore throat- and some cough. He was prescribed abx by Randee. He has had CT scans since last visit.\par \par 9/24/19: Pt returned after having a consultation with Dr. Blanchard for discussion about possible radiation. Mr. Soto is slightly more anxious about treatment but is determined to "do what he has to do". \par \par Disease: lung cancer \par Pathology: adeno ca \par TNM stage: T3, N3, M1 \par AJCC Stage: IV  [de-identified] : Patient presents today for follow-up. Since his last visit, he has completed simulation for radiation therapy planning. He reports that he has been generally well since his last appointment. He does report that he has had R foot redness recently which has been bothering him. He states that the area is slightly painful, but denies any associated oozing/discharge. No recent fevers or chills. He does state that he injured his R foot ~ 8 weeks prior to this starting and he is unsure if it is related. Patient follows with a Podiatrist who had prescribed him an antibiotic for this (pt believes he was given Levaquin) and he explains that the redness initially improved, but it has since returned. On ROS, patient denies any recent headache, chest pain, shortness of breath, vomiting, or abdominal pain.

## 2019-12-10 ENCOUNTER — OUTPATIENT (OUTPATIENT)
Dept: OUTPATIENT SERVICES | Facility: HOSPITAL | Age: 69
LOS: 1 days | Discharge: HOME | End: 2019-12-10

## 2019-12-10 DIAGNOSIS — C34.90 MALIGNANT NEOPLASM OF UNSPECIFIED PART OF UNSPECIFIED BRONCHUS OR LUNG: ICD-10-CM

## 2019-12-10 DIAGNOSIS — Z98.890 OTHER SPECIFIED POSTPROCEDURAL STATES: Chronic | ICD-10-CM

## 2019-12-11 ENCOUNTER — APPOINTMENT (OUTPATIENT)
Dept: DERMATOLOGY | Facility: CLINIC | Age: 69
End: 2019-12-11

## 2019-12-16 NOTE — DISEASE MANAGEMENT
[Clinical] : TNM Stage: c [RAVEN] : RAVEN [FreeTextEntry4] : metastatic lung adenocarcinoma [TTNM] : 3 [NTNM] : 3 [de-identified] : 2tx/1400cGy [de-identified] : 5tx/3500cGy [MTNM] : 1 [de-identified] : lung

## 2019-12-16 NOTE — REASON FOR VISIT
[Lung Cancer] : lung cancer [Spouse] : spouse [Routine On-Treatment] : a routine on-treatment visit for

## 2019-12-16 NOTE — HISTORY OF PRESENT ILLNESS
[FreeTextEntry1] : Mr. Alvarado Soto is a 68 year-old male with T3N3M1 adenocarcinoma of the lung currently receiving carbo, pemetrexed, keytruda under care of Dr. Rodgers with recent imaging showing progression of a left upper lobe lung mass. KRAS+/PDL1 70%\par \par 11/7/19\par -Tolerating tx well\par -no complaints of pain,SOB or cough\par -Eating well\par -will complete on tuesday\par -discharge packet given along with followup appt

## 2019-12-17 ENCOUNTER — APPOINTMENT (OUTPATIENT)
Dept: HEMATOLOGY ONCOLOGY | Facility: CLINIC | Age: 69
End: 2019-12-17

## 2019-12-17 ENCOUNTER — APPOINTMENT (OUTPATIENT)
Dept: INFUSION THERAPY | Facility: HOSPITAL | Age: 69
End: 2019-12-17

## 2019-12-23 ENCOUNTER — MEDICATION RENEWAL (OUTPATIENT)
Age: 69
End: 2019-12-23

## 2019-12-24 ENCOUNTER — APPOINTMENT (OUTPATIENT)
Dept: CARDIOLOGY | Facility: CLINIC | Age: 69
End: 2019-12-24
Payer: MEDICARE

## 2019-12-24 ENCOUNTER — NON-APPOINTMENT (OUTPATIENT)
Age: 69
End: 2019-12-24

## 2019-12-24 VITALS
HEIGHT: 68 IN | WEIGHT: 137 LBS | SYSTOLIC BLOOD PRESSURE: 130 MMHG | BODY MASS INDEX: 20.76 KG/M2 | OXYGEN SATURATION: 96 % | HEART RATE: 57 BPM | DIASTOLIC BLOOD PRESSURE: 80 MMHG

## 2019-12-24 PROCEDURE — 93000 ELECTROCARDIOGRAM COMPLETE: CPT

## 2019-12-24 RX ORDER — LOSARTAN POTASSIUM 50 MG/1
50 TABLET, FILM COATED ORAL DAILY
Qty: 30 | Refills: 0 | Status: DISCONTINUED | COMMUNITY
Start: 2019-12-23 | End: 2019-12-24

## 2019-12-25 NOTE — PHYSICAL EXAM
[General Appearance - Well Developed] : well developed [Normal Appearance] : normal appearance [Well Groomed] : well groomed [No Deformities] : no deformities [General Appearance - Well Nourished] : well nourished [Normal Conjunctiva] : the conjunctiva exhibited no abnormalities [General Appearance - In No Acute Distress] : no acute distress [Eyelids - No Xanthelasma] : the eyelids demonstrated no xanthelasmas [Normal Oral Mucosa] : normal oral mucosa [No Oral Pallor] : no oral pallor [Normal Jugular Venous A Waves Present] : normal jugular venous A waves present [No Oral Cyanosis] : no oral cyanosis [Normal Jugular Venous V Waves Present] : normal jugular venous V waves present [No Jugular Venous Martinez A Waves] : no jugular venous martinez A waves [Heart Rate And Rhythm] : heart rate and rhythm were normal [Heart Sounds] : normal S1 and S2 [Abdomen Tenderness] : non-tender [Abdomen Soft] : soft [Murmurs] : no murmurs present [Abnormal Walk] : normal gait [Abdomen Mass (___ Cm)] : no abdominal mass palpated [Nail Clubbing] : no clubbing of the fingernails [Gait - Sufficient For Exercise Testing] : the gait was sufficient for exercise testing [Cyanosis, Localized] : no localized cyanosis [Petechial Hemorrhages (___cm)] : no petechial hemorrhages [Skin Color & Pigmentation] : normal skin color and pigmentation [Skin Lesions] : no skin lesions [] : no rash [No Venous Stasis] : no venous stasis [No Xanthoma] : no  xanthoma was observed [Oriented To Time, Place, And Person] : oriented to person, place, and time [No Skin Ulcers] : no skin ulcer [Mood] : the mood was normal [No Anxiety] : not feeling anxious [Affect] : the affect was normal [FreeTextEntry1] : diminished throughout all lobes

## 2019-12-25 NOTE — REASON FOR VISIT
[FreeTextEntry1] : Pt following up after visit with his oncologist last week when he had a high blood pressure reading where he states that it was 177/78. His oncologist Dr. Rodgers advised him to take two pills of 50mg Losartan which he started doing today. pressure is now well controlled in the office. \par Pt is also starting new drug trial for called [id number] for lung cancer. Started two weeks ago and is tolerating well. \par Patient is here for continued blood pressure treatment and optimization.  Patient's blood pressure has been somewhat labile over the last several weeks.

## 2019-12-26 ENCOUNTER — APPOINTMENT (OUTPATIENT)
Dept: RADIATION ONCOLOGY | Facility: CLINIC | Age: 69
End: 2019-12-26
Payer: MEDICARE

## 2019-12-26 ENCOUNTER — APPOINTMENT (OUTPATIENT)
Dept: RADIATION ONCOLOGY | Facility: CLINIC | Age: 69
End: 2019-12-26

## 2019-12-26 ENCOUNTER — RX RENEWAL (OUTPATIENT)
Age: 69
End: 2019-12-26

## 2019-12-26 ENCOUNTER — MEDICATION RENEWAL (OUTPATIENT)
Age: 69
End: 2019-12-26

## 2019-12-26 VITALS
BODY MASS INDEX: 20.3 KG/M2 | OXYGEN SATURATION: 97 % | RESPIRATION RATE: 18 BRPM | TEMPERATURE: 98.1 F | HEART RATE: 56 BPM | HEIGHT: 68 IN | SYSTOLIC BLOOD PRESSURE: 198 MMHG | WEIGHT: 133.93 LBS | DIASTOLIC BLOOD PRESSURE: 104 MMHG

## 2019-12-26 PROCEDURE — 99024 POSTOP FOLLOW-UP VISIT: CPT | Mod: GC

## 2019-12-26 RX ORDER — ATORVASTATIN CALCIUM 20 MG/1
20 TABLET, FILM COATED ORAL
Qty: 90 | Refills: 0 | Status: DISCONTINUED | COMMUNITY
End: 2019-12-26

## 2019-12-26 RX ORDER — BETAMETHASONE DIPROPIONATE 0.5 MG/G
0.05 CREAM TOPICAL
Qty: 1 | Refills: 1 | Status: DISCONTINUED | COMMUNITY
Start: 2019-04-02 | End: 2019-12-26

## 2019-12-26 RX ORDER — MULTIVIT-MIN/FA/LYCOPEN/LUTEIN .4-300-25
TABLET ORAL
Refills: 0 | Status: DISCONTINUED | COMMUNITY
End: 2019-12-26

## 2019-12-26 RX ORDER — FOLIC ACID 1 MG/1
1 TABLET ORAL
Qty: 30 | Refills: 6 | Status: DISCONTINUED | COMMUNITY
Start: 2019-02-21 | End: 2019-12-26

## 2019-12-26 NOTE — REASON FOR VISIT
[Post-Treatment Evaluation] : post-treatment evaluation for [Lung Cancer] : lung cancer [Spouse] : spouse

## 2019-12-26 NOTE — VITALS
[Least Pain Intensity: 0/10] : 0/10 [Maximal Pain Intensity: 6/10] : 6/10 [Pain Description/Quality: ___] : Pain description/quality: [unfilled] [Pain Duration: ___] : Pain duration: [unfilled] [OTC] : OTC [Pain Interferes with ADLs] : Pain interferes with activities of daily living. [Pain Location: ___] : Pain Location: [unfilled] [80: Normal activity with effort; some signs or symptoms of disease.] : 80: Normal activity with effort; some signs or symptoms of disease.  [ECOG Performance Status: 0 - Fully active, able to carry on all pre-disease performance without restriction] : Performance Status: 0 - Fully active, able to carry on all pre-disease performance without restriction

## 2019-12-26 NOTE — REVIEW OF SYSTEMS
[SOB on Exertion] : shortness of breath during exertion [Lower Ext Edema] : lower extremity edema [Constipation] : constipation [Easy Bruising] : a tendency for easy bruising [Constipation: Grade 1 - Occasional or intermittent symptoms; occasional use of stool softeners, laxatives, dietary modification, or enema] : Constipation: Grade 1 - Occasional or intermittent symptoms; occasional use of stool softeners, laxatives, dietary modification, or enema [Negative] : Neurological [Dysphagia: Grade 0] : Dysphagia: Grade 0 [Diarrhea: Grade 0] : Diarrhea: Grade 0 [Nausea: Grade 0] : Nausea: Grade 0 [Vomiting: Grade 0] : Vomiting: Grade 0 [Cough: Grade 0] : Cough: Grade 0 [Dyspnea: Grade 1 - Shortness of breath with moderate exertion] : Dyspnea: Grade 1 - Shortness of breath with moderate exertion [Fatigue: Grade 0] : Fatigue: Grade 0

## 2019-12-27 ENCOUNTER — MEDICATION RENEWAL (OUTPATIENT)
Age: 69
End: 2019-12-27

## 2019-12-27 LAB
ALBUMIN SERPL ELPH-MCNC: 3.6 G/DL
ALP BLD-CCNC: 86 U/L
ALT SERPL-CCNC: 12 U/L
ANION GAP SERPL CALC-SCNC: 12 MMOL/L
AST SERPL-CCNC: 22 U/L
BASOPHILS # BLD AUTO: 0.08 K/UL
BASOPHILS NFR BLD AUTO: 1.4 %
BILIRUB SERPL-MCNC: 0.6 MG/DL
BUN SERPL-MCNC: 12 MG/DL
CALCIUM SERPL-MCNC: 10 MG/DL
CHLORIDE SERPL-SCNC: 100 MMOL/L
CHOLEST SERPL-MCNC: 268 MG/DL
CHOLEST/HDLC SERPL: 5.3 RATIO
CK SERPL-CCNC: 54 U/L
CO2 SERPL-SCNC: 30 MMOL/L
CREAT SERPL-MCNC: 1.17 MG/DL
DIGOXIN SERPL-MCNC: 0.6 NG/ML
EOSINOPHIL # BLD AUTO: 0.12 K/UL
EOSINOPHIL NFR BLD AUTO: 2.1 %
ESTIMATED AVERAGE GLUCOSE: 114 MG/DL
GLUCOSE SERPL-MCNC: 100 MG/DL
HBA1C MFR BLD HPLC: 5.6 %
HCT VFR BLD CALC: 41.9 %
HDLC SERPL-MCNC: 51 MG/DL
HGB BLD-MCNC: 12.2 G/DL
IMM GRANULOCYTES NFR BLD AUTO: 0.4 %
LDLC SERPL CALC-MCNC: 194 MG/DL
LDLC SERPL DIRECT ASSAY-MCNC: 207 MG/DL
LYMPHOCYTES # BLD AUTO: 1.15 K/UL
LYMPHOCYTES NFR BLD AUTO: 20.2 %
MAN DIFF?: NORMAL
MCHC RBC-ENTMCNC: 29.1 GM/DL
MCHC RBC-ENTMCNC: 31.3 PG
MCV RBC AUTO: 107.4 FL
MONOCYTES # BLD AUTO: 0.64 K/UL
MONOCYTES NFR BLD AUTO: 11.2 %
NEUTROPHILS # BLD AUTO: 3.69 K/UL
NEUTROPHILS NFR BLD AUTO: 64.7 %
NT-PROBNP SERPL-MCNC: 1441 PG/ML
PLATELET # BLD AUTO: 305 K/UL
POTASSIUM SERPL-SCNC: 4.8 MMOL/L
PROT SERPL-MCNC: 7.1 G/DL
RBC # BLD: 3.9 M/UL
RBC # FLD: 15.9 %
SODIUM SERPL-SCNC: 142 MMOL/L
TRIGL SERPL-MCNC: 115 MG/DL
WBC # FLD AUTO: 5.7 K/UL

## 2020-01-06 ENCOUNTER — APPOINTMENT (OUTPATIENT)
Dept: CARDIOLOGY | Facility: CLINIC | Age: 70
End: 2020-01-06
Payer: MEDICARE

## 2020-01-06 ENCOUNTER — NON-APPOINTMENT (OUTPATIENT)
Age: 70
End: 2020-01-06

## 2020-01-06 VITALS — SYSTOLIC BLOOD PRESSURE: 148 MMHG | RESPIRATION RATE: 17 BRPM | DIASTOLIC BLOOD PRESSURE: 82 MMHG | HEIGHT: 68 IN

## 2020-01-06 VITALS — OXYGEN SATURATION: 98 % | HEART RATE: 67 BPM

## 2020-01-06 PROCEDURE — 99214 OFFICE O/P EST MOD 30 MIN: CPT

## 2020-01-06 PROCEDURE — 93000 ELECTROCARDIOGRAM COMPLETE: CPT

## 2020-01-06 RX ORDER — HYDRALAZINE HYDROCHLORIDE 25 MG/1
25 TABLET ORAL
Qty: 90 | Refills: 0 | Status: DISCONTINUED | COMMUNITY
Start: 2019-12-24 | End: 2020-01-06

## 2020-01-06 RX ORDER — LOSARTAN POTASSIUM 100 MG/1
100 TABLET, FILM COATED ORAL
Refills: 0 | Status: DISCONTINUED | COMMUNITY
End: 2020-01-06

## 2020-01-07 NOTE — PHYSICAL EXAM
[General Appearance - Well Developed] : well developed [Normal Appearance] : normal appearance [General Appearance - Well Nourished] : well nourished [Well Groomed] : well groomed [No Deformities] : no deformities [General Appearance - In No Acute Distress] : no acute distress [Normal Conjunctiva] : the conjunctiva exhibited no abnormalities [Eyelids - No Xanthelasma] : the eyelids demonstrated no xanthelasmas [Normal Oral Mucosa] : normal oral mucosa [No Oral Pallor] : no oral pallor [No Oral Cyanosis] : no oral cyanosis [Normal Jugular Venous A Waves Present] : normal jugular venous A waves present [Normal Jugular Venous V Waves Present] : normal jugular venous V waves present [No Jugular Venous Martinez A Waves] : no jugular venous martinez A waves [Heart Rate And Rhythm] : heart rate and rhythm were normal [Heart Sounds] : normal S1 and S2 [Murmurs] : no murmurs present [Abdomen Soft] : soft [Abdomen Tenderness] : non-tender [Abnormal Walk] : normal gait [Abdomen Mass (___ Cm)] : no abdominal mass palpated [Gait - Sufficient For Exercise Testing] : the gait was sufficient for exercise testing [Nail Clubbing] : no clubbing of the fingernails [Cyanosis, Localized] : no localized cyanosis [Petechial Hemorrhages (___cm)] : no petechial hemorrhages [] : no rash [No Venous Stasis] : no venous stasis [Skin Color & Pigmentation] : normal skin color and pigmentation [No Skin Ulcers] : no skin ulcer [Skin Lesions] : no skin lesions [No Xanthoma] : no  xanthoma was observed [Oriented To Time, Place, And Person] : oriented to person, place, and time [Mood] : the mood was normal [No Anxiety] : not feeling anxious [Affect] : the affect was normal [FreeTextEntry1] : diminished throughout all lobes

## 2020-01-07 NOTE — REASON FOR VISIT
[FreeTextEntry1] : Pt following up after visit with his oncologist last week when he had a high blood pressure reading where he states that it was 177/78. His oncologist Dr. Rodgers advised him to take two pills of 50mg Losartan which he started doing today. pressure is now well controlled in the office. \par Pt is also starting new drug trial for called [id number] for lung cancer. Started two weeks ago and is tolerating well. \par Patient is here for continued blood pressure treatment and optimization.  Patient's blood pressure has been somewhat labile over the last several weeks.  The patient has had problems with hydralazine with complaints of nausea since starting taking it.  Approximately 2 weeks ago.  The medication and went back to amlodipine 10 mg .

## 2020-01-14 NOTE — HISTORY OF PRESENT ILLNESS
[FreeTextEntry1] : Mr. Soto is a 69 year old man with a diagnosis of a T3N3M1, stage RAVEN adenocarcinoma of the lung who underwent carbo/pem/pembro systemic therapy with noted progression of a left upper lung mass and underwent SBRT to 35 Gy in 5 fractions for oligoprogressive disease completed on 11/12/19.\par \par Brief Oncologic History: He developed hoarsness of his voice in 9/2018 and CT noted 6.6 cm left upper lung mass and mediastinal adenopathy.  PET CT noted subcentimeter bilateral nodules compatible with metastatic disease.  He started on systemic therapy in February 2016 after biopsy demonstrated adenocarcinoma with good response.  He has been on maintenance pem/pembro.  In September 2019 the Left upper lobe mass increased in size to 4.4 cm with other disease stable noted on CT/PET.\par \par He tolerated SBRT to the left upper lobe nodule well.  Since his 11/13/19 visit with his cardiologist, he has cancelled/been a no show to his other appointments. Plans for SBRT to the adrenal nodule were cancelled due to plans for enrollment on study at [facility].\par \par He was planned for KRAS inhibitor [id number] at [facility] on trial with plans for CT C/A/P on 11/18/19 demonstrating decreased SHENA mass size to 5.7 cm and left hilar adenopathy.  Continues to demonstrate 1.4 cm right adrenal mass. \par \par He presents for post-treatment evaluation.  He started [id number] 3 weeks ago without side effects.  Has SOB on exertion and uses inhaler.  Has left nipple pain. Planned for follow-up imaging on Jan 10th.

## 2020-01-30 NOTE — ED ADULT NURSE REASSESSMENT NOTE - GENERAL PATIENT STATE
cooperative/smiling/interactive/family/SO at bedside/comfortable appearance
This document is complete and the patient is ready for discharge.

## 2020-02-03 ENCOUNTER — NON-APPOINTMENT (OUTPATIENT)
Age: 70
End: 2020-02-03

## 2020-02-03 ENCOUNTER — APPOINTMENT (OUTPATIENT)
Dept: CARDIOLOGY | Facility: CLINIC | Age: 70
End: 2020-02-03
Payer: MEDICARE

## 2020-02-03 ENCOUNTER — RX RENEWAL (OUTPATIENT)
Age: 70
End: 2020-02-03

## 2020-02-03 VITALS
HEIGHT: 68 IN | DIASTOLIC BLOOD PRESSURE: 70 MMHG | WEIGHT: 130 LBS | HEART RATE: 57 BPM | TEMPERATURE: 97.7 F | SYSTOLIC BLOOD PRESSURE: 136 MMHG | BODY MASS INDEX: 19.7 KG/M2 | OXYGEN SATURATION: 95 %

## 2020-02-03 DIAGNOSIS — E87.5 HYPERKALEMIA: ICD-10-CM

## 2020-02-03 PROCEDURE — 93000 ELECTROCARDIOGRAM COMPLETE: CPT

## 2020-02-03 PROCEDURE — 99213 OFFICE O/P EST LOW 20 MIN: CPT

## 2020-02-04 NOTE — PHYSICAL EXAM
[General Appearance - Well Developed] : well developed [Normal Appearance] : normal appearance [Well Groomed] : well groomed [General Appearance - Well Nourished] : well nourished [No Deformities] : no deformities [General Appearance - In No Acute Distress] : no acute distress [Normal Conjunctiva] : the conjunctiva exhibited no abnormalities [Eyelids - No Xanthelasma] : the eyelids demonstrated no xanthelasmas [Normal Oral Mucosa] : normal oral mucosa [No Oral Pallor] : no oral pallor [No Oral Cyanosis] : no oral cyanosis [Normal Jugular Venous A Waves Present] : normal jugular venous A waves present [Normal Jugular Venous V Waves Present] : normal jugular venous V waves present [No Jugular Venous Martinez A Waves] : no jugular venous martinez A waves [Respiration, Rhythm And Depth] : normal respiratory rhythm and effort [Exaggerated Use Of Accessory Muscles For Inspiration] : no accessory muscle use [Auscultation Breath Sounds / Voice Sounds] : lungs were clear to auscultation bilaterally [Heart Rate And Rhythm] : heart rate and rhythm were normal [Heart Sounds] : normal S1 and S2 [Murmurs] : no murmurs present [Abdomen Soft] : soft [Abdomen Tenderness] : non-tender [Abdomen Mass (___ Cm)] : no abdominal mass palpated [Abnormal Walk] : normal gait [Gait - Sufficient For Exercise Testing] : the gait was sufficient for exercise testing [Cyanosis, Localized] : no localized cyanosis [Nail Clubbing] : no clubbing of the fingernails [Petechial Hemorrhages (___cm)] : no petechial hemorrhages [Skin Color & Pigmentation] : normal skin color and pigmentation [] : no rash [No Venous Stasis] : no venous stasis [Skin Lesions] : no skin lesions [No Skin Ulcers] : no skin ulcer [No Xanthoma] : no  xanthoma was observed [Oriented To Time, Place, And Person] : oriented to person, place, and time [Affect] : the affect was normal [Mood] : the mood was normal [No Anxiety] : not feeling anxious [FreeTextEntry1] : Regular rate and rhythm, NL S1, S2, non-displaced PMI, chest non-tender; no rubs,heaves  or gallops a  Grade 2/6 systolic murmur noted at the LSB

## 2020-02-04 NOTE — HISTORY OF PRESENT ILLNESS
[FreeTextEntry1] : The patient here for evaluation of high blood pressure. Patient is currently tolerating the current antihypertensive regime and they deny headaches, stiff neck, visual changes, or PND. The patient has been trying to stay on a low-sodium diet.  The patient presents today with his blood pressure readings and it appears most of the readings are in good control.  Patient states that he is feeling well and has no side effects from current treatment.\par \par

## 2020-02-10 ENCOUNTER — RX RENEWAL (OUTPATIENT)
Age: 70
End: 2020-02-10

## 2020-03-27 ENCOUNTER — RX RENEWAL (OUTPATIENT)
Age: 70
End: 2020-03-27

## 2020-05-04 ENCOUNTER — APPOINTMENT (OUTPATIENT)
Dept: CARDIOLOGY | Facility: CLINIC | Age: 70
End: 2020-05-04
Payer: MEDICARE

## 2020-05-04 PROCEDURE — 99213 OFFICE O/P EST LOW 20 MIN: CPT | Mod: 95

## 2020-05-04 NOTE — HISTORY OF PRESENT ILLNESS
[Home] : at home, [unfilled] , at the time of the visit. [Medical Office: ([facility])___] : at the medical office located in  [Self] : self [Patient] : the patient [FreeTextEntry1] : Pt states that he recently had a follow up w/ his oncologist and he is doing much better, he had a potassium of 3.5 so he is now on Potassium supplementation. He states he has had no episodes of A Fib lately.  The [employer] site at the time of the visit was not functioning..\par The patient presents for evaluation of high blood pressure. Patient is currently tolerating the current  antihypertensive regime and they deny headaches, stiff neck, visual changes, pedal Edema or PND. They also are here for follow-up of elevated cholesterol. Patient is currently tolerating medication and denies muscle pain, joint pain, back pain, tea colored urine ,nausea, vomiting, abdominal pain or diarrhea. The patient is trying to follow a low cholesterol diet. \par He has been taking his blood pressures at home which range from 110-120s/70s. He denies any edema in his legs at this time. \par \par We have attempted to use [employer] for this telehealth visit but it was not working properly.

## 2020-06-11 NOTE — PATIENT PROFILE ADULT - FUNCTIONAL SCREEN CURRENT LEVEL: EATING, MLM
Call to patient. Patient denies any complaints related to anticoagulation therapy at this time. Patient reports no change in medication, diet or health. Reinforced with patient to call clinic with any medication changes as this can impact INR. Reinforced signs and symptoms bleeding/clotting with patient. Patient aware to seek medical care if signs and symptoms develop. Advised that if patient falls and/or hits their head, they should seek medical attention. Verbalizes understanding.     Dosing instructions given to patient verbally over the phone. Advised to call the clinic with any questions or concerns. Patient verbalizes understanding. Has clinic number.    Anticoagulation Summary  As of 2020    INR goal:   2.5-3.5   TTR:   52.0 % (7.8 y)   INR used for dosin.5 (6/10/2020)   Warfarin maintenance plan:   6 mg (4 mg x 1.5) every W, F; 4 mg (4 mg x 1) all other days   Weekly warfarin total:   32 mg   Plan last modified:   Milana Chinchilla RN (3/23/2020)   Next INR check:   2020   Priority:   Follow-Up - 2 Weeks   Target end date:       Indications    Long term (current) use of anticoagulants [Z79.01]  History of aortic valve replacement [Z95.2]  Persistent atrial fibrillation [I48.19]  Cardiomyopathy (CMS/HCC) [I42.9]  Encounter for therapeutic drug monitoring [Z51.81]             Anticoagulation Episode Summary     INR check location:       Preferred lab:       Send INR reminders to:   ANTICOAG (OPEN ENROLLMENT) ACS CARD/EP    Comments:   *Next STAC due 20; CCLab 2020; AAC/ACL; 4 mg tablets; Mechanical AVR in       Anticoagulation Care Providers     Provider Role Specialty Phone number    Von Wolf MD Referring Internal Medicine - Clinical Cardiac Electrophysiology [phone]          Supervising provider: Dr. Pranav Rodriguez       0 = independent

## 2020-07-08 ENCOUNTER — RX RENEWAL (OUTPATIENT)
Age: 70
End: 2020-07-08

## 2020-08-13 ENCOUNTER — RX RENEWAL (OUTPATIENT)
Age: 70
End: 2020-08-13

## 2020-08-17 ENCOUNTER — NON-APPOINTMENT (OUTPATIENT)
Age: 70
End: 2020-08-17

## 2020-08-17 ENCOUNTER — APPOINTMENT (OUTPATIENT)
Dept: CARDIOLOGY | Facility: CLINIC | Age: 70
End: 2020-08-17
Payer: MEDICARE

## 2020-08-17 VITALS — DIASTOLIC BLOOD PRESSURE: 68 MMHG | OXYGEN SATURATION: 96 % | HEART RATE: 52 BPM | SYSTOLIC BLOOD PRESSURE: 102 MMHG

## 2020-08-17 DIAGNOSIS — F41.9 ANXIETY DISORDER, UNSPECIFIED: ICD-10-CM

## 2020-08-17 DIAGNOSIS — Z01.810 ENCOUNTER FOR PREPROCEDURAL CARDIOVASCULAR EXAMINATION: ICD-10-CM

## 2020-08-17 PROCEDURE — 99214 OFFICE O/P EST MOD 30 MIN: CPT

## 2020-08-17 PROCEDURE — 93000 ELECTROCARDIOGRAM COMPLETE: CPT

## 2020-08-17 RX ORDER — LOSARTAN POTASSIUM 100 MG/1
100 TABLET, FILM COATED ORAL DAILY
Qty: 90 | Refills: 0 | Status: DISCONTINUED | COMMUNITY
Start: 2019-12-24 | End: 2020-08-17

## 2020-08-17 NOTE — PHYSICAL EXAM
[General Appearance - Well Developed] : well developed [General Appearance - Well Nourished] : well nourished [Well Groomed] : well groomed [Normal Appearance] : normal appearance [General Appearance - In No Acute Distress] : no acute distress [No Deformities] : no deformities [Normal Conjunctiva] : the conjunctiva exhibited no abnormalities [No Oral Pallor] : no oral pallor [Eyelids - No Xanthelasma] : the eyelids demonstrated no xanthelasmas [Normal Oral Mucosa] : normal oral mucosa [Normal Jugular Venous A Waves Present] : normal jugular venous A waves present [No Oral Cyanosis] : no oral cyanosis [No Jugular Venous Martinez A Waves] : no jugular venous martinez A waves [Normal Jugular Venous V Waves Present] : normal jugular venous V waves present [Respiration, Rhythm And Depth] : normal respiratory rhythm and effort [Exaggerated Use Of Accessory Muscles For Inspiration] : no accessory muscle use [Heart Rate And Rhythm] : heart rate and rhythm were normal [Auscultation Breath Sounds / Voice Sounds] : lungs were clear to auscultation bilaterally [Heart Sounds] : normal S1 and S2 [Abdomen Soft] : soft [Murmurs] : no murmurs present [Abdomen Tenderness] : non-tender [Abdomen Mass (___ Cm)] : no abdominal mass palpated [Abnormal Walk] : normal gait [Gait - Sufficient For Exercise Testing] : the gait was sufficient for exercise testing [Petechial Hemorrhages (___cm)] : no petechial hemorrhages [Nail Clubbing] : no clubbing of the fingernails [Cyanosis, Localized] : no localized cyanosis [Skin Color & Pigmentation] : normal skin color and pigmentation [] : no rash [No Venous Stasis] : no venous stasis [Skin Lesions] : no skin lesions [No Skin Ulcers] : no skin ulcer [No Xanthoma] : no  xanthoma was observed [Oriented To Time, Place, And Person] : oriented to person, place, and time [No Anxiety] : not feeling anxious [Affect] : the affect was normal [Mood] : the mood was normal

## 2020-08-17 NOTE — HISTORY OF PRESENT ILLNESS
[FreeTextEntry1] : I was asked to see this delightful patient today for Pre-procedural   prior to  ERCP with  Dr. Jaison Barreto at fax number   [phone].  The patient denies fever, cough, wheezing, sputum production, hemoptysis, dyspnea, congestion, diarrhea, constipation, nausea, vomiting, bright red blood per rectum, melena, angina, chest pain, palpitations, diaphoresis, PND, incontinence, frequency, urgency, dysuria, edema, joint pain, headache, weakness, numbness and dizziness. The date of the planned procedure is: TBD\par Pt with Metastatic lung cancer. Pt was on an experimental drug trial. Pt had CT A/P performed last week which showed increased right adrenal mets as well as pancreatic lesion. Pt follows with Oncology Dr. Washington. \par The patient presents for evaluation of high blood pressure. Patient is currently tolerating the current  antihypertensive regime and they deny headaches, stiff neck, visual changes, pedal Edema or PND. They also are here for follow-up of elevated cholesterol. Patient is currently tolerating medication and denies muscle pain, joint pain, back pain, tea colored urine ,nausea, vomiting, abdominal pain or diarrhea. The patient is trying to follow a low cholesterol diet.\par The patient is also here for follow-up of atrial fibrillation and currently they feel well with an occasional episode of palpitations.  The patient states they are reliably taking the anticoagulation medicine and are not having any signs of bleeding they deny any dizziness headaches nosebleeds or black tarry stools\par Pt has never had nuclear stress test done as he states his oncologist Dr Washington "does not want him under any stress."\par

## 2020-09-07 ENCOUNTER — INPATIENT (INPATIENT)
Facility: HOSPITAL | Age: 70
LOS: 0 days | Discharge: ANOTHER HOSPITAL | DRG: 871 | End: 2020-09-08
Attending: INTERNAL MEDICINE | Admitting: INTERNAL MEDICINE
Payer: MEDICARE

## 2020-09-07 VITALS — HEIGHT: 68 IN | WEIGHT: 119.93 LBS | TEMPERATURE: 100 F

## 2020-09-07 DIAGNOSIS — J96.90 RESPIRATORY FAILURE, UNSPECIFIED, UNSPECIFIED WHETHER WITH HYPOXIA OR HYPERCAPNIA: ICD-10-CM

## 2020-09-07 DIAGNOSIS — Z98.890 OTHER SPECIFIED POSTPROCEDURAL STATES: Chronic | ICD-10-CM

## 2020-09-07 LAB
ALBUMIN SERPL ELPH-MCNC: 3.4 G/DL — SIGNIFICANT CHANGE UP (ref 3.3–5)
ALP SERPL-CCNC: 75 U/L — SIGNIFICANT CHANGE UP (ref 40–120)
ALT FLD-CCNC: 32 U/L — SIGNIFICANT CHANGE UP (ref 10–45)
ANION GAP SERPL CALC-SCNC: 10 MMOL/L — SIGNIFICANT CHANGE UP (ref 5–17)
APPEARANCE UR: CLEAR — SIGNIFICANT CHANGE UP
APTT BLD: 29.8 SEC — SIGNIFICANT CHANGE UP (ref 27.5–35.5)
AST SERPL-CCNC: 41 U/L — HIGH (ref 10–40)
BACTERIA # UR AUTO: NEGATIVE — SIGNIFICANT CHANGE UP
BASE EXCESS BLDV CALC-SCNC: 1.5 MMOL/L — SIGNIFICANT CHANGE UP (ref -2–2)
BASOPHILS # BLD AUTO: 0.06 K/UL — SIGNIFICANT CHANGE UP (ref 0–0.2)
BASOPHILS NFR BLD AUTO: 0.6 % — SIGNIFICANT CHANGE UP (ref 0–2)
BILIRUB SERPL-MCNC: 0.5 MG/DL — SIGNIFICANT CHANGE UP (ref 0.2–1.2)
BILIRUB UR-MCNC: NEGATIVE — SIGNIFICANT CHANGE UP
BUN SERPL-MCNC: 12 MG/DL — SIGNIFICANT CHANGE UP (ref 7–23)
CA-I SERPL-SCNC: 1.2 MMOL/L — SIGNIFICANT CHANGE UP (ref 1.12–1.3)
CALCIUM SERPL-MCNC: 8.9 MG/DL — SIGNIFICANT CHANGE UP (ref 8.4–10.5)
CHLORIDE BLDV-SCNC: 102 MMOL/L — SIGNIFICANT CHANGE UP (ref 96–108)
CHLORIDE SERPL-SCNC: 101 MMOL/L — SIGNIFICANT CHANGE UP (ref 96–108)
CO2 BLDV-SCNC: 34 MMOL/L — HIGH (ref 22–30)
CO2 SERPL-SCNC: 29 MMOL/L — SIGNIFICANT CHANGE UP (ref 22–31)
COLOR SPEC: YELLOW — SIGNIFICANT CHANGE UP
CREAT SERPL-MCNC: 1.61 MG/DL — HIGH (ref 0.5–1.3)
DIFF PNL FLD: NEGATIVE — SIGNIFICANT CHANGE UP
EOSINOPHIL # BLD AUTO: 0.09 K/UL — SIGNIFICANT CHANGE UP (ref 0–0.5)
EOSINOPHIL NFR BLD AUTO: 0.9 % — SIGNIFICANT CHANGE UP (ref 0–6)
EPI CELLS # UR: 3 /HPF — SIGNIFICANT CHANGE UP
GAS PNL BLDV: 138 MMOL/L — SIGNIFICANT CHANGE UP (ref 135–145)
GAS PNL BLDV: SIGNIFICANT CHANGE UP
GAS PNL BLDV: SIGNIFICANT CHANGE UP
GLUCOSE BLDV-MCNC: 142 MG/DL — HIGH (ref 70–99)
GLUCOSE SERPL-MCNC: 148 MG/DL — HIGH (ref 70–99)
GLUCOSE UR QL: NEGATIVE — SIGNIFICANT CHANGE UP
HCO3 BLDV-SCNC: 32 MMOL/L — HIGH (ref 21–29)
HCT VFR BLD CALC: 44.3 % — SIGNIFICANT CHANGE UP (ref 39–50)
HCT VFR BLDA CALC: 46 % — SIGNIFICANT CHANGE UP (ref 39–50)
HGB BLD CALC-MCNC: 14.8 G/DL — SIGNIFICANT CHANGE UP (ref 13–17)
HGB BLD-MCNC: 14.4 G/DL — SIGNIFICANT CHANGE UP (ref 13–17)
HYALINE CASTS # UR AUTO: 7 /LPF — HIGH (ref 0–2)
IMM GRANULOCYTES NFR BLD AUTO: 0.4 % — SIGNIFICANT CHANGE UP (ref 0–1.5)
INR BLD: 2.14 RATIO — HIGH (ref 0.88–1.16)
KETONES UR-MCNC: NEGATIVE — SIGNIFICANT CHANGE UP
LACTATE BLDV-MCNC: 3.1 MMOL/L — HIGH (ref 0.7–2)
LACTATE SERPL-SCNC: 1.6 MMOL/L — SIGNIFICANT CHANGE UP (ref 0.7–2)
LEUKOCYTE ESTERASE UR-ACNC: NEGATIVE — SIGNIFICANT CHANGE UP
LYMPHOCYTES # BLD AUTO: 1.16 K/UL — SIGNIFICANT CHANGE UP (ref 1–3.3)
LYMPHOCYTES # BLD AUTO: 11.1 % — LOW (ref 13–44)
MCHC RBC-ENTMCNC: 32.5 GM/DL — SIGNIFICANT CHANGE UP (ref 32–36)
MCHC RBC-ENTMCNC: 32.5 PG — SIGNIFICANT CHANGE UP (ref 27–34)
MCV RBC AUTO: 100 FL — SIGNIFICANT CHANGE UP (ref 80–100)
MONOCYTES # BLD AUTO: 0.55 K/UL — SIGNIFICANT CHANGE UP (ref 0–0.9)
MONOCYTES NFR BLD AUTO: 5.3 % — SIGNIFICANT CHANGE UP (ref 2–14)
NEUTROPHILS # BLD AUTO: 8.57 K/UL — HIGH (ref 1.8–7.4)
NEUTROPHILS NFR BLD AUTO: 81.7 % — HIGH (ref 43–77)
NITRITE UR-MCNC: NEGATIVE — SIGNIFICANT CHANGE UP
NRBC # BLD: 0 /100 WBCS — SIGNIFICANT CHANGE UP (ref 0–0)
OTHER CELLS CSF MANUAL: 2 ML/DL — LOW (ref 18–23)
PCO2 BLDV: 80 MMHG — HIGH (ref 35–50)
PH BLDV: 7.22 — LOW (ref 7.35–7.45)
PH UR: 6 — SIGNIFICANT CHANGE UP (ref 5–8)
PLATELET # BLD AUTO: 263 K/UL — SIGNIFICANT CHANGE UP (ref 150–400)
PO2 BLDV: <20 MMHG — LOW (ref 25–45)
POTASSIUM BLDV-SCNC: 4.3 MMOL/L — SIGNIFICANT CHANGE UP (ref 3.5–5.3)
POTASSIUM SERPL-MCNC: 4.6 MMOL/L — SIGNIFICANT CHANGE UP (ref 3.5–5.3)
POTASSIUM SERPL-SCNC: 4.6 MMOL/L — SIGNIFICANT CHANGE UP (ref 3.5–5.3)
PROCALCITONIN SERPL-MCNC: 0.22 NG/ML — HIGH (ref 0.02–0.1)
PROT SERPL-MCNC: 6.3 G/DL — SIGNIFICANT CHANGE UP (ref 6–8.3)
PROT UR-MCNC: ABNORMAL
PROTHROM AB SERPL-ACNC: 24.6 SEC — HIGH (ref 10.6–13.6)
RBC # BLD: 4.43 M/UL — SIGNIFICANT CHANGE UP (ref 4.2–5.8)
RBC # FLD: 12 % — SIGNIFICANT CHANGE UP (ref 10.3–14.5)
RBC CASTS # UR COMP ASSIST: 5 /HPF — HIGH (ref 0–4)
SAO2 % BLDV: 10 % — LOW (ref 67–88)
SARS-COV-2 RNA SPEC QL NAA+PROBE: SIGNIFICANT CHANGE UP
SODIUM SERPL-SCNC: 140 MMOL/L — SIGNIFICANT CHANGE UP (ref 135–145)
SP GR SPEC: 1.04 — HIGH (ref 1.01–1.02)
TROPONIN T, HIGH SENSITIVITY RESULT: 22 NG/L — SIGNIFICANT CHANGE UP (ref 0–51)
TSH SERPL-MCNC: 1.09 UIU/ML — SIGNIFICANT CHANGE UP (ref 0.27–4.2)
UROBILINOGEN FLD QL: NEGATIVE — SIGNIFICANT CHANGE UP
WBC # BLD: 10.47 K/UL — SIGNIFICANT CHANGE UP (ref 3.8–10.5)
WBC # FLD AUTO: 10.47 K/UL — SIGNIFICANT CHANGE UP (ref 3.8–10.5)
WBC UR QL: 9 /HPF — HIGH (ref 0–5)

## 2020-09-07 PROCEDURE — 70496 CT ANGIOGRAPHY HEAD: CPT | Mod: 26

## 2020-09-07 PROCEDURE — 70498 CT ANGIOGRAPHY NECK: CPT | Mod: 26

## 2020-09-07 PROCEDURE — 70450 CT HEAD/BRAIN W/O DYE: CPT | Mod: 26,59

## 2020-09-07 PROCEDURE — 93010 ELECTROCARDIOGRAM REPORT: CPT

## 2020-09-07 PROCEDURE — 71250 CT THORAX DX C-: CPT | Mod: 26

## 2020-09-07 PROCEDURE — 99292 CRITICAL CARE ADDL 30 MIN: CPT | Mod: CS

## 2020-09-07 PROCEDURE — 99291 CRITICAL CARE FIRST HOUR: CPT | Mod: CS

## 2020-09-07 RX ORDER — LACTOBACILLUS ACIDOPHILUS 100MM CELL
1 CAPSULE ORAL
Qty: 0 | Refills: 0 | DISCHARGE

## 2020-09-07 RX ORDER — AMLODIPINE BESYLATE 2.5 MG/1
5 TABLET ORAL DAILY
Refills: 0 | Status: DISCONTINUED | OUTPATIENT
Start: 2020-09-07 | End: 2020-09-08

## 2020-09-07 RX ORDER — DEXAMETHASONE 0.5 MG/5ML
4 ELIXIR ORAL ONCE
Refills: 0 | Status: COMPLETED | OUTPATIENT
Start: 2020-09-07 | End: 2020-09-07

## 2020-09-07 RX ORDER — LEVETIRACETAM 250 MG/1
500 TABLET, FILM COATED ORAL EVERY 12 HOURS
Refills: 0 | Status: DISCONTINUED | OUTPATIENT
Start: 2020-09-07 | End: 2020-09-07

## 2020-09-07 RX ORDER — ALPRAZOLAM 0.25 MG
0.5 TABLET ORAL DAILY
Refills: 0 | Status: DISCONTINUED | OUTPATIENT
Start: 2020-09-07 | End: 2020-09-07

## 2020-09-07 RX ORDER — ATORVASTATIN CALCIUM 80 MG/1
1 TABLET, FILM COATED ORAL
Qty: 0 | Refills: 0 | DISCHARGE

## 2020-09-07 RX ORDER — FOLIC ACID 0.8 MG
1 TABLET ORAL
Qty: 0 | Refills: 0 | DISCHARGE

## 2020-09-07 RX ORDER — LEVETIRACETAM 250 MG/1
500 TABLET, FILM COATED ORAL ONCE
Refills: 0 | Status: COMPLETED | OUTPATIENT
Start: 2020-09-07 | End: 2020-09-07

## 2020-09-07 RX ORDER — AZTREONAM 2 G
2000 VIAL (EA) INJECTION ONCE
Refills: 0 | Status: COMPLETED | OUTPATIENT
Start: 2020-09-07 | End: 2020-09-07

## 2020-09-07 RX ORDER — IPRATROPIUM/ALBUTEROL SULFATE 18-103MCG
3 AEROSOL WITH ADAPTER (GRAM) INHALATION EVERY 6 HOURS
Refills: 0 | Status: DISCONTINUED | OUTPATIENT
Start: 2020-09-07 | End: 2020-09-08

## 2020-09-07 RX ORDER — DEXAMETHASONE 0.5 MG/5ML
4 ELIXIR ORAL EVERY 6 HOURS
Refills: 0 | Status: DISCONTINUED | OUTPATIENT
Start: 2020-09-07 | End: 2020-09-08

## 2020-09-07 RX ORDER — LISINOPRIL 2.5 MG/1
20 TABLET ORAL DAILY
Refills: 0 | Status: DISCONTINUED | OUTPATIENT
Start: 2020-09-07 | End: 2020-09-08

## 2020-09-07 RX ORDER — SENNA PLUS 8.6 MG/1
2 TABLET ORAL
Qty: 0 | Refills: 0 | DISCHARGE

## 2020-09-07 RX ORDER — SODIUM CHLORIDE 9 MG/ML
1000 INJECTION, SOLUTION INTRAVENOUS ONCE
Refills: 0 | Status: COMPLETED | OUTPATIENT
Start: 2020-09-07 | End: 2020-09-07

## 2020-09-07 RX ORDER — LANOLIN ALCOHOL/MO/W.PET/CERES
1 CREAM (GRAM) TOPICAL
Qty: 0 | Refills: 0 | DISCHARGE

## 2020-09-07 RX ORDER — ALPRAZOLAM 0.25 MG
1 TABLET ORAL
Qty: 0 | Refills: 0 | DISCHARGE

## 2020-09-07 RX ORDER — METOPROLOL TARTRATE 50 MG
100 TABLET ORAL DAILY
Refills: 0 | Status: DISCONTINUED | OUTPATIENT
Start: 2020-09-07 | End: 2020-09-08

## 2020-09-07 RX ORDER — SODIUM CHLORIDE 9 MG/ML
500 INJECTION, SOLUTION INTRAVENOUS ONCE
Refills: 0 | Status: COMPLETED | OUTPATIENT
Start: 2020-09-07 | End: 2020-09-07

## 2020-09-07 RX ORDER — ACETAMINOPHEN 500 MG
1000 TABLET ORAL ONCE
Refills: 0 | Status: COMPLETED | OUTPATIENT
Start: 2020-09-07 | End: 2020-09-07

## 2020-09-07 RX ORDER — AZTREONAM 2 G
500 VIAL (EA) INJECTION EVERY 8 HOURS
Refills: 0 | Status: DISCONTINUED | OUTPATIENT
Start: 2020-09-07 | End: 2020-09-08

## 2020-09-07 RX ORDER — PANTOPRAZOLE SODIUM 20 MG/1
40 TABLET, DELAYED RELEASE ORAL
Refills: 0 | Status: DISCONTINUED | OUTPATIENT
Start: 2020-09-07 | End: 2020-09-08

## 2020-09-07 RX ORDER — ACETAMINOPHEN 500 MG
650 TABLET ORAL EVERY 6 HOURS
Refills: 0 | Status: DISCONTINUED | OUTPATIENT
Start: 2020-09-07 | End: 2020-09-08

## 2020-09-07 RX ORDER — VANCOMYCIN HCL 1 G
1000 VIAL (EA) INTRAVENOUS ONCE
Refills: 0 | Status: COMPLETED | OUTPATIENT
Start: 2020-09-07 | End: 2020-09-07

## 2020-09-07 RX ORDER — DIGOXIN 250 MCG
0.12 TABLET ORAL DAILY
Refills: 0 | Status: DISCONTINUED | OUTPATIENT
Start: 2020-09-07 | End: 2020-09-08

## 2020-09-07 RX ORDER — HALOPERIDOL DECANOATE 100 MG/ML
0.5 INJECTION INTRAMUSCULAR ONCE
Refills: 0 | Status: COMPLETED | OUTPATIENT
Start: 2020-09-07 | End: 2020-09-07

## 2020-09-07 RX ORDER — ALPRAZOLAM 0.25 MG
0.5 TABLET ORAL ONCE
Refills: 0 | Status: DISCONTINUED | OUTPATIENT
Start: 2020-09-07 | End: 2020-09-07

## 2020-09-07 RX ADMIN — Medication 4 MILLIGRAM(S): at 22:42

## 2020-09-07 RX ADMIN — Medication 100 MILLIGRAM(S): at 15:01

## 2020-09-07 RX ADMIN — Medication 250 MILLIGRAM(S): at 16:05

## 2020-09-07 RX ADMIN — LEVETIRACETAM 400 MILLIGRAM(S): 250 TABLET, FILM COATED ORAL at 15:34

## 2020-09-07 RX ADMIN — Medication 400 MILLIGRAM(S): at 15:01

## 2020-09-07 RX ADMIN — LEVETIRACETAM 400 MILLIGRAM(S): 250 TABLET, FILM COATED ORAL at 13:45

## 2020-09-07 RX ADMIN — SODIUM CHLORIDE 1000 MILLILITER(S): 9 INJECTION, SOLUTION INTRAVENOUS at 13:45

## 2020-09-07 RX ADMIN — Medication 4 MILLIGRAM(S): at 13:45

## 2020-09-07 RX ADMIN — Medication 3 MILLILITER(S): at 23:48

## 2020-09-07 RX ADMIN — HALOPERIDOL DECANOATE 0.5 MILLIGRAM(S): 100 INJECTION INTRAMUSCULAR at 23:48

## 2020-09-07 RX ADMIN — SODIUM CHLORIDE 500 MILLILITER(S): 9 INJECTION, SOLUTION INTRAVENOUS at 17:14

## 2020-09-07 RX ADMIN — Medication 0.5 MILLIGRAM(S): at 22:40

## 2020-09-07 RX ADMIN — Medication 50 MILLIGRAM(S): at 22:41

## 2020-09-07 RX ADMIN — Medication 1000 MILLIGRAM(S): at 17:00

## 2020-09-07 NOTE — ED PROVIDER NOTE - CRITICAL CARE PROVIDED
interpretation of diagnostic studies/consultation with other physicians/conducted a detailed discussion of DNR status/direct patient care (not related to procedure)/additional history taking/documentation/consult w/ pt's family directly relating to pts condition interpretation of diagnostic studies/documentation/consultation with other physicians/consult w/ pt's family directly relating to pts condition/conducted a detailed discussion of DNR status/additional history taking

## 2020-09-07 NOTE — CONSULT NOTE ADULT - SUBJECTIVE AND OBJECTIVE BOX
HPI:  70 y/o male with PMHx of metastatic lung cancer (left perihilar/aorticopulmonary metastatic LAD) on chemotherapy, atrial fibrillation with RVR presents as code stroke for acute change in mental status per wife at bedside. LKN was sometime in the afternoon on 9/6/20, wife stated patient started to have difficulty ambulating in the evening. He also has had decreased oral intake and was severely dehydrated this entire week. She said that he was lethargic with generalized weakness. This morning at around 11AM, patient was speaking and told wife that he did not need to go to the hospital. Shortly afterwards, patient stopped answering questions. He had a fever yesterday evening at 100.7F (wife states she checked because he felt warm to touch). At baseline, patient ambulates independently without mechanical assistance. Patient follows with Oncology team at [facility] for metastatic lung cancer and was started on new clinical trial for chemo on 9/2/20.     Wife denies noticing recent cough, shortness of breath, abdominal pain, nausea, vomiting.     LKN 9/6/20, NIHSS 20, preMRS 1, CT head done demonstrates 4.3 x 3.1 hypodense lesion in L temporoparietal area w/ extensive surrounding edema. 4mm midline shift, uncal involvement; no hemorrhage.    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    Vital Signs Last 24 Hrs  T(C): 37.6 (07 Sep 2020 15:27), Max: 38 (07 Sep 2020 14:27)  T(F): 99.7 (07 Sep 2020 15:27), Max: 100.4 (07 Sep 2020 14:27)  HR: 61 (07 Sep 2020 15:27) (61 - 77)  BP: 82/62 (07 Sep 2020 15:27) (82/62 - 108/68)  BP(mean): 68 (07 Sep 2020 15:27) (67 - 68)  RR: 15 (07 Sep 2020 15:27) (14 - 18)  SpO2: 100% (07 Sep 2020 15:27) (98% - 100%)    PHYSICAL EXAM:  GENERAL: Mild distress  HEENT: Normocephalic; conjunctivae and sclerae clear; dry mucous membranes;   NECK: Supple  EXTREMITIES: no cyanosis; no edema; no calf tenderness  SKIN: warm and dry; no rash             Neurological Exam:  - Mental Status: Awake, alert, not oriented to person, place or time, followed command "squeeze fingers" on left (not on right), no audible speech, inattentive to right side, moves b/l feet spontaneously  - Cranial Nerves II-XII:    II:  PERRL bilaterally; blink to threat intact bilaterally, however unreliable  III, IV, VI:  Left gaze preference, does not move eyes past midline to the right  VII: Mild R facial asymmetry with eye closure  XI:  Head turning and shoulder shrug are intact bilaterally  XII:  Tongue protrudes in the midline  - Motor: RUE 2/5, RLE 2/5, LUE 3/5, LLE 3/5 diminished muscle bulk; increased tone on right compared to left; moves feet spontaneously  - Reflexes:  2+ and symmetric at the biceps, triceps, brachioradialis, knees, and ankles; R upgoing toe, patient withdrew on left  - Sensory: grimaces in response to noxious stimulus on LLE extremity, not on right  - Gait/Coordination: unable to assess    09-07    140  |  101  |  12  ----------------------------<  148<H>  4.6   |  29  |  1.61<H>    Ca    8.9      07 Sep 2020 13:07    TPro  6.3  /  Alb  3.4  /  TBili  0.5  /  DBili  x   /  AST  41<H>  /  ALT  32  /  AlkPhos  75  09-07                            14.4   10.47 )-----------( 263      ( 07 Sep 2020 13:07 )             44.3       Radiology  CT Angio Neck w/ IV Cont (09.07.20 @ 13:25)  IMPRESSION:    CT ANGIOGRAPHY NECK:    No evidence of hemodynamically significant stenosis by NASCET criteria. No evidence of vascular dissection.    Interval decrease in size of a left upper lobe lung mass since 9/26/2019. Patchy opacities in the partially imaged left upper and lower lobe, concerning for infection. Please see concurrent dedicated chest CT study for further details.    CT ANGIOGRAPHY BRAIN:    No major vessel occlusion or critical stenosis. No evidence of aneurysm or other vascular malformation.      CT Brain Stroke Protocol (09.07.20 @ 13:18)  IMPRESSION:    A 4.3 x 3.1 cm hypodense lesion within the left parietotemporal lobes with extensive surrounding low-attenuation change within the white matter and mass effect with associated 4 mm left rightward shift of the midline structures, new since 9/26/2019, concerning for metastatic disease or less likely primary brain tumor.    No acute intracranial hemorrhage.    Recommend further evaluation with a contrast-enhanced brain MRI examination, if there are no clinical contraindications. HPI:  68 y/o male with PMHx of metastatic lung cancer (left perihilar/aorticopulmonary metastatic LAD) on chemotherapy, atrial fibrillation with RVR on Eliquis presents as code stroke for acute change in mental status per wife at bedside. LKN was sometime in the afternoon on 9/6/20, wife stated patient started to have difficulty ambulating in the evening. He also has had decreased oral intake and was severely dehydrated this entire week. She said that he was lethargic with generalized weakness. This morning at around 11AM, patient was speaking and told wife that he did not need to go to the hospital. Shortly afterwards, patient stopped answering questions and became very lethargic. He had a fever yesterday evening at 100.7F (wife states she checked because he felt warm to touch). At baseline, patient ambulates independently without mechanical assistance, however recently has needed some assistance. Patient follows with Oncology team at [facility] for metastatic lung cancer and was started on new clinical trial for chemo on 9/2/20. Wife reports that patient did not have a cough, abdominal pain, nausea/vomiting, diarrhea, and/or urinary incontinence.    LKN 9/6/20, NIHSS 20, preMRS 1, CT head done demonstrates 4.3 x 3.1 hypodense lesion in L temporoparietal area w/ extensive surrounding edema. 4mm midline shift, uncal involvement; no hemorrhage.    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    Vital Signs Last 24 Hrs  T(C): 37.6 (07 Sep 2020 15:27), Max: 38 (07 Sep 2020 14:27)  T(F): 99.7 (07 Sep 2020 15:27), Max: 100.4 (07 Sep 2020 14:27)  HR: 61 (07 Sep 2020 15:27) (61 - 77)  BP: 82/62 (07 Sep 2020 15:27) (82/62 - 108/68)  BP(mean): 68 (07 Sep 2020 15:27) (67 - 68)  RR: 15 (07 Sep 2020 15:27) (14 - 18)  SpO2: 100% (07 Sep 2020 15:27) (98% - 100%)    PHYSICAL EXAM:  GENERAL: Mild distress  HEENT: Normocephalic; conjunctivae and sclerae clear; dry mucous membranes;   NECK: Supple  EXTREMITIES: no cyanosis; no edema; no calf tenderness  SKIN: warm and dry; no rash             Neurological Exam:  - Mental Status: Awake, alert, not oriented to person, place or time, followed command "squeeze fingers" on left (not on right), no audible speech, inattentive to right side, moves b/l feet spontaneously  - Cranial Nerves II-XII:    II:  PERRL bilaterally; blink to threat intact bilaterally, however unreliable  III, IV, VI:  Left gaze preference, does not move eyes past midline to the right  VII: Mild R facial asymmetry with eye closure  XI:  Head turning and shoulder shrug are intact bilaterally  XII:  Tongue protrudes in the midline  - Motor: RUE 2/5, RLE 2/5, LUE 3/5, LLE 3/5 diminished muscle bulk; increased tone on right compared to left; moves feet spontaneously  - Reflexes:  2+ and symmetric at the biceps, triceps, brachioradialis, knees, and ankles; R upgoing toe, patient withdrew on left  - Sensory: grimaces in response to noxious stimulus on LLE extremity, not on right  - Gait/Coordination: unable to assess    09-07    140  |  101  |  12  ----------------------------<  148<H>  4.6   |  29  |  1.61<H>    Ca    8.9      07 Sep 2020 13:07    TPro  6.3  /  Alb  3.4  /  TBili  0.5  /  DBili  x   /  AST  41<H>  /  ALT  32  /  AlkPhos  75  09-07                            14.4   10.47 )-----------( 263      ( 07 Sep 2020 13:07 )             44.3       Radiology  CT Angio Neck w/ IV Cont (09.07.20 @ 13:25)  IMPRESSION:    CT ANGIOGRAPHY NECK:    No evidence of hemodynamically significant stenosis by NASCET criteria. No evidence of vascular dissection.    Interval decrease in size of a left upper lobe lung mass since 9/26/2019. Patchy opacities in the partially imaged left upper and lower lobe, concerning for infection. Please see concurrent dedicated chest CT study for further details.    CT ANGIOGRAPHY BRAIN:    No major vessel occlusion or critical stenosis. No evidence of aneurysm or other vascular malformation.      CT Brain Stroke Protocol (09.07.20 @ 13:18)  IMPRESSION:    A 4.3 x 3.1 cm hypodense lesion within the left parietotemporal lobes with extensive surrounding low-attenuation change within the white matter and mass effect with associated 4 mm left rightward shift of the midline structures, new since 9/26/2019, concerning for metastatic disease or less likely primary brain tumor.    No acute intracranial hemorrhage.    Recommend further evaluation with a contrast-enhanced brain MRI examination, if there are no clinical contraindications.

## 2020-09-07 NOTE — CONSULT NOTE ADULT - ASSESSMENT
KENROYARIKVICK    68YO M w/ PMHx metastatic lung ca (s/p chemo/immuno tx, currently on exp trial), followed at [facility] med/onc, COPD, on eliquis, presents after 2 wks worsening R-sided weakness, now last few days with AMS. Stroke code called, CTH showed 4.3x3.1cm L temporoparietal mass w/ sig vasogenic edema c/f met. CTA neg. Fever of 100.4 on admission, WBC 14, slightly elevated procal, increasing O2 requirement (now on HF FiO2 100%), CT chest c/f extensive PNA, now on vanc. Exam: EOV, pupils 3mm reactive b/l, L gaze deviation, FC Lt side (squeeze hand, moves feet), not verbalizing, LUE ag, LLE spont, RUE tonic flexed, no w/d, RLE nothing.    - Likely admit to MICU given respiratory status/sepsis  - Wife prefers no neurosurgical intervention here, further w/u at [facility] after stabilized; ok w/ imaging.   - Loaded w/ Keppra 1g, cont 500 bid (presentation c/f paco)   - MRI brain ww/o, CT CAP+C when medically stable   - FU fever w/u   - Na goal 140-150 (current Na 140)  - neuro checks   - pain mgmt per primary    Will d/w attending

## 2020-09-07 NOTE — ED PROVIDER NOTE - PMH
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

## 2020-09-07 NOTE — CHART NOTE - NSCHARTNOTEFT_GEN_A_CORE
VCIK ALLEN  69y, Male    Chief c/o: ams        Vital Signs:  Vital Signs Last 24 Hrs  T(C): 37.6 (07 Sep 2020 15:27), Max: 38 (07 Sep 2020 14:27)  T(F): 99.7 (07 Sep 2020 15:27), Max: 100.4 (07 Sep 2020 14:27)  HR: 61 (07 Sep 2020 15:27) (61 - 77)  BP: 82/62 (07 Sep 2020 15:27) (82/62 - 108/68)  BP(mean): 68 (07 Sep 2020 15:27) (67 - 68)  RR: 15 (07 Sep 2020 15:27) (14 - 18)  SpO2: 100% (07 Sep 2020 15:27) (98% - 100%)    Labs:                        14.4   10.47 )-----------( 263      ( 07 Sep 2020 13:07 )             44.3     CBC Full  -  ( 07 Sep 2020 13:07 )  WBC Count : 10.47 K/uL  RBC Count : 4.43 M/uL  Hemoglobin : 14.4 g/dL  Hematocrit : 44.3 %  Platelet Count - Automated : 263 K/uL  Mean Cell Volume : 100.0 fl  Mean Cell Hemoglobin : 32.5 pg  Mean Cell Hemoglobin Concentration : 32.5 gm/dL  Auto Neutrophil # : 8.57 K/uL  Auto Lymphocyte # : 1.16 K/uL  Auto Monocyte # : 0.55 K/uL  Auto Eosinophil # : 0.09 K/uL  Auto Basophil # : 0.06 K/uL  Auto Neutrophil % : 81.7 %  Auto Lymphocyte % : 11.1 %  Auto Monocyte % : 5.3 %  Auto Eosinophil % : 0.9 %  Auto Basophil % : 0.6 %    09-07    140  |  101  |  12  ----------------------------<  148<H>  4.6   |  29  |  1.61<H>    Ca    8.9      07 Sep 2020 13:07    TPro  6.3  /  Alb  3.4  /  TBili  0.5  /  DBili  x   /  AST  41<H>  /  ALT  32  /  AlkPhos  75  09-07        LIVER FUNCTIONS - ( 07 Sep 2020 13:07 )  Alb: 3.4 g/dL / Pro: 6.3 g/dL / ALK PHOS: 75 U/L / ALT: 32 U/L / AST: 41 U/L / GGT: x             Adult Advanced Hemodynamics Last 24 Hrs  CVP(mm Hg): --  CVP(cm H2O): --  CO: --  CI: --  PA: --  PA(mean): --  PCWP: --  SVR: --  SVRI: --  PVR: --  PVRI: --  PT/INR - ( 07 Sep 2020 13:07 )   PT: 24.6 sec;   INR: 2.14 ratio         PTT - ( 07 Sep 2020 13:07 )  PTT:29.8 sec    Physical Exam:  PHYSICAL EXAM:      Constitutional:    Eyes:    ENMT:    Neck:    Breasts:    Back:    Respiratory:    Cardiovascular:    Gastrointestinal:    Genitourinary:    Rectal:    Extremities:    Vascular:    Neurological:    Skin:    Lymph Nodes:    Musculoskeletal:    Psychiatric:        Assesment / Plan: VICK ALLEN  69y, Male    Chief c/o: ams    68yo M with PMHx of metastatic lung cancer currently on chemotherapy coming in with likely sepsis. As per EMS, pt has been having fevers at home since yesterday. Today, the patient was altered and not answering questions, with possibly some right sided weakness and so was brought in and had a code stroke called    Vital Signs:  Vital Signs Last 24 Hrs  T(C): 37.6 (07 Sep 2020 15:27), Max: 38 (07 Sep 2020 14:27)  T(F): 99.7 (07 Sep 2020 15:27), Max: 100.4 (07 Sep 2020 14:27)  HR: 61 (07 Sep 2020 15:27) (61 - 77)  BP: 82/62 (07 Sep 2020 15:27) (82/62 - 108/68)  BP(mean): 68 (07 Sep 2020 15:27) (67 - 68)  RR: 15 (07 Sep 2020 15:27) (14 - 18)  SpO2: 100% (07 Sep 2020 15:27) (98% - 100%)    Labs:                        14.4   10.47 )-----------( 263      ( 07 Sep 2020 13:07 )             44.3     CBC Full  -  ( 07 Sep 2020 13:07 )  WBC Count : 10.47 K/uL  RBC Count : 4.43 M/uL  Hemoglobin : 14.4 g/dL  Hematocrit : 44.3 %  Platelet Count - Automated : 263 K/uL  Mean Cell Volume : 100.0 fl  Mean Cell Hemoglobin : 32.5 pg  Mean Cell Hemoglobin Concentration : 32.5 gm/dL  Auto Neutrophil # : 8.57 K/uL  Auto Lymphocyte # : 1.16 K/uL  Auto Monocyte # : 0.55 K/uL  Auto Eosinophil # : 0.09 K/uL  Auto Basophil # : 0.06 K/uL  Auto Neutrophil % : 81.7 %  Auto Lymphocyte % : 11.1 %  Auto Monocyte % : 5.3 %  Auto Eosinophil % : 0.9 %  Auto Basophil % : 0.6 %    09-07    140  |  101  |  12  ----------------------------<  148<H>  4.6   |  29  |  1.61<H>    Ca    8.9      07 Sep 2020 13:07    TPro  6.3  /  Alb  3.4  /  TBili  0.5  /  DBili  x   /  AST  41<H>  /  ALT  32  /  AlkPhos  75  09-07        LIVER FUNCTIONS - ( 07 Sep 2020 13:07 )  Alb: 3.4 g/dL / Pro: 6.3 g/dL / ALK PHOS: 75 U/L / ALT: 32 U/L / AST: 41 U/L / GGT: x             LACTATE 3.1       PTT - ( 07 Sep 2020 13:07 )  PTT:29.8 sec    Physical Exam:  PHYSICAL EXAM: PT ALERT, ORIENT X 3      hEAD CT NO ACUTE CHANGE        Assesment / Plan:68yo M with PMHx of metastatic lung cancer currently on chemotherapy coming in with likely sepsis. As per EMS, pt has been having fevers at home since yesterday. Today, the patient was altered and not answering questions, with possibly some right sided weakness and so was brought in and had a code stroke called  fEVER POSSIBLE pna ?  BLOOD CULTURE/ URINE CULTURE SENT  iv HYDRATION 1500CC GIVEN  will repeat lact level  iv ANTIBIOTIC FROM eR VANCO/AZTREANAM X 1  kEPPRA LOADED  nEURO FOLLOWING  d/w GEMMA WHALEY  [facility]

## 2020-09-07 NOTE — CHART NOTE - NSCHARTNOTEFT_GEN_A_CORE
After d/w attending, and given ongoing GOC discussions, patient not considered a surgical candidate at this time. Would continue dex, keppra as previously recommended, fu w/ neurology given likely sz and med/onc for metastatic w/u.     Will sign off, Please reconsult as needed.

## 2020-09-07 NOTE — H&P ADULT - NSHPPHYSICALEXAM_GEN_ALL_CORE
PHYSICAL EXAMINATION:  Vital Signs Last 24 Hrs  T(C): 37.6 (07 Sep 2020 15:27), Max: 38 (07 Sep 2020 14:27)  T(F): 99.7 (07 Sep 2020 15:27), Max: 100.4 (07 Sep 2020 14:27)  HR: 55 (07 Sep 2020 17:27) (55 - 77)  BP: 106/63 (07 Sep 2020 17:27) (82/62 - 108/68)  BP(mean): 77 (07 Sep 2020 17:27) (67 - 77)  RR: 15 (07 Sep 2020 17:27) (14 - 18)  SpO2: 100% (07 Sep 2020 17:27) (98% - 100%)  CAPILLARY BLOOD GLUCOSE      POCT Blood Glucose.: 138 mg/dL (07 Sep 2020 13:01)      GENERAL: NAD, well-groomed, well-developed  HEAD:  atraumatic, normocephalic  EYES: sclera anicteric  ENMT: mucous membranes moist  NECK: supple, No JVD  CHEST/LUNG: clear to auscultation bilaterally; no rales, rhonchi, or wheezing b/l  HEART: normal S1, S2  ABDOMEN: BS+, soft, ND, NT   EXTREMITIES:  pulses palpable; no clubbing, cyanosis, or edema b/l LEs  NEURO: awake, confused, R sided weakness  SKIN: no rashes or lesions

## 2020-09-07 NOTE — ED PROVIDER NOTE - CARE PLAN
Principal Discharge DX:	Brain metastasis  Secondary Diagnosis:	Sepsis Principal Discharge DX:	Respiratory failure  Secondary Diagnosis:	Sepsis  Secondary Diagnosis:	Seizure Principal Discharge DX:	Respiratory failure  Secondary Diagnosis:	Sepsis  Secondary Diagnosis:	Seizure  Secondary Diagnosis:	Brain metastasis

## 2020-09-07 NOTE — H&P ADULT - NSHPLABSRESULTS_GEN_ALL_CORE
14.4   10.47 )-----------( 263      ( 07 Sep 2020 13:07 )             44.3       09-07    140  |  101  |  12  ----------------------------<  148<H>  4.6   |  29  |  1.61<H>    Ca    8.9      07 Sep 2020 13:07    TPro  6.3  /  Alb  3.4  /  TBili  0.5  /  DBili  x   /  AST  41<H>  /  ALT  32  /  AlkPhos  75  09-07                  PT/INR - ( 07 Sep 2020 13:07 )   PT: 24.6 sec;   INR: 2.14 ratio         PTT - ( 07 Sep 2020 13:07 )  PTT:29.8 sec    Lactate Trend    < from: CT Brain Stroke Protocol (09.07.20 @ 13:18) >    IMPRESSION:    A 4.3 x 3.1 cm hypodense lesion within the left parietotemporal lobes with extensive surrounding low-attenuation change within the white matter and mass effect with associated 4 mm left rightward shift of the midline structures, new since 9/26/2019, concerning for metastatic disease or less likely primary brain tumor.    No acute intracranial hemorrhage.    Recommend further evaluation with a contrast-enhanced brain MRI examination, if there are no clinical contraindications.    < end of copied text >    < from: CT Chest No Cont (09.07.20 @ 13:07) >      IMPRESSION:  Interval decrease in size of left upper lobe mass with pleural extension.    Extensive patchy airspace opacities in the leftlung, with bronchovascular wall thickening, suspicious for pneumonia.    Increased nodular right adrenal thickening, not well evaluated on noncontrast CT.    < end of copied text >

## 2020-09-07 NOTE — ED ADULT NURSE REASSESSMENT NOTE - NS ED NURSE REASSESS COMMENT FT1
Pt straight cathed using sterile technique. Second RN present to confirm sterility. Pt tolerated procedure well. Sterile specimens collected and sent to lab. Will cont to monitor. Approx  600cc concentrated yellow urine output noted to bag.

## 2020-09-07 NOTE — ED ADULT NURSE NOTE - OBJECTIVE STATEMENT
69 y male hx of Lung CA with mets on chemo presents from home with fevers of 103 at home and right sided facial droop and weakness BIBEMS. EMS reports to onset of symptoms 1 hour PTA; code stroke called 1253; see code stroke flow sheet for times. Patient awake; unable to answer questions. Saturating 88% on 6L nasal cannula; placed on %; saturating 94%. patient tachypneic in 30s. Abdomen soft nondistended. Safety maintained- bed locked in lowest position.

## 2020-09-07 NOTE — H&P ADULT - HISTORY OF PRESENT ILLNESS
70yo M with PMHx of metastatic lung cancer currently on chemotherapy coming in with likely sepsis. As per EMS, pt has been having fevers at home since yesterday. Today, the patient was altered and not answering questions, with possibly some right sided weakness and so was brought in and had a code stroke called.    	Onc Dr Washington at [facility] in [place]  cell

## 2020-09-07 NOTE — ED ADULT TRIAGE NOTE - CHIEF COMPLAINT QUOTE
R sided facial droop, R sided weakness since 30 minutes ago as per EMS. Fevers/AMS. R sided facial droop, R sided weakness since 30 minutes ago as per EMS. Fevers/AMS. Code stroke called.

## 2020-09-07 NOTE — ED PROVIDER NOTE - OBJECTIVE STATEMENT
70yo M with PMHx of metastatic lung cancer currently on chemotherapy coming in with likely sepsis. As per EMS, pt has been having fevers at home since yesterday. Today, the patient was altered and not answering questions, with possibly some right sided weakness and so was brought in and had a code stroke called. 70yo M with PMHx of metastatic lung cancer currently on chemotherapy coming in with likely sepsis. As per EMS, pt has been having fevers at home since yesterday. Today, the patient was altered and not answering questions, with possibly some right sided weakness and so was brought in and had a code stroke called.    Onc Dr Washington at [facility] in [place]  cell 68yo M with PMHx of metastatic lung cancer currently on chemotherapy coming in with likely sepsis. As per EMS, pt has been having fevers at home since yesterday. Today, the patient was altered and not answering questions, with possibly some right sided weakness and so was brought in and had a code stroke called.  Wife cell: [phone] Vee  Onc Dr Washington at [facility] in [place]  cell

## 2020-09-07 NOTE — PATIENT PROFILE ADULT - STATED REASON FOR ADMISSION
Altered Mental Status Likely secondary to left brain mass Altered Mental Status Likely secondary to brain mass

## 2020-09-07 NOTE — ED ADULT NURSE NOTE - NSIMPLEMENTINTERV_GEN_ALL_ED
Implemented All Fall Risk Interventions:  [place] to call system. Call bell, personal items and telephone within reach. Instruct patient to call for assistance. Room bathroom lighting operational. Non-slip footwear when patient is off stretcher. Physically safe environment: no spills, clutter or unnecessary equipment. Stretcher in lowest position, wheels locked, appropriate side rails in place. Provide visual cue, wrist band, yellow gown, etc. Monitor gait and stability. Monitor for mental status changes and reorient to person, place, and time. Review medications for side effects contributing to fall risk. Reinforce activity limits and safety measures with patient and family.

## 2020-09-07 NOTE — ED PROVIDER NOTE - CLINICAL SUMMARY MEDICAL DECISION MAKING FREE TEXT BOX
70yo M with metastatic lung cancer presenting with sepsis and AMS. Will start antibiotics; and will likely need admission. Probably not a stroke and more likely delirium 2/2 to malignancy.

## 2020-09-07 NOTE — ED PROVIDER NOTE - ATTENDING CONTRIBUTION TO CARE
------------ATTENDING NOTE------------   pt brought to ED by EMS for concerns of a stroke, describing sudden onset of R sided facial droop, complicated as fever / rigors and sepsis-like picture as well, complicated by advanced lung cancer, initial imaging w/ new metastatic brain disease w/ edema, awaiting additional labs, NSGY and Neuro recs -->  - Edinson Taylor MD   ---------------------------------------------- ------------ATTENDING NOTE------------   pt brought to ED by EMS for concerns of a stroke, describing sudden onset of R sided facial droop, complicated as fever / rigors and sepsis-like picture as well, complicated by advanced lung cancer, initial imaging w/ new metastatic brain disease w/ edema, awaiting additional labs, NSGY and Neuro recs --> pt w/ increase respiratory demands, guarded on high-flow nasal cannula, high concerns for acute decompensation if intubation and lack of effectiveness in overall clinical picture, goals of care d/w family -->  - Edinson Taylor MD   ---------------------------------------------- ------------ATTENDING NOTE------------   pt brought to ED by EMS for concerns of a stroke, describing sudden onset of R sided facial droop, complicated as fever / rigors and sepsis-like picture as well, complicated by advanced lung cancer, initial imaging w/ new metastatic brain disease w/ edema, awaiting additional labs, NSGY and Neuro recs --> pt w/ increase respiratory demands, guarded on high-flow nasal cannula, high concerns for acute decompensation if intubation and lack of effectiveness in overall clinical picture, goals of care d/w family --> strongly consider DNR/DNI, ED Sign Out pending final goals of care and MICU consult for admission.  - Edinson Taylor MD   ----------------------------------------------

## 2020-09-07 NOTE — ED PROVIDER NOTE - PROGRESS NOTE DETAILS
BLESSING filled out w/ family and Dr Lazo; DNR/DNI per family request.  at bedside per family request. PMD called for admission. Dr. Salas Note: s/o from day attending pending disposition.  Spoke to pt and wife, agrees with DNR/DNI, continue medical care including medications, antibiotics, fluids, etc...  Stable for admission.  Poor prognosis. NSg consulted already. Pt is stable on HFNC, mental status improving, do not believe pt would benefit from MICU level of care at this time so will hold off on MICU consult for respiratory status. will admit to medical floors. Will provide judicious IVF administration given concern for respiratory status on initial presentation instead of full sepsis bolus JULIEN Gray (Resident)

## 2020-09-07 NOTE — H&P ADULT - NSHPREVIEWOFSYSTEMS_GEN_ALL_CORE
REVIEW OF SYSTEMS: limited     CONSTITUTIONAL: No weakness, fevers or chills  EYES/ENT: No visual changes;  No vertigo or throat pain   NECK: No pain or stiffness  RESPIRATORY: No cough, wheezing, hemoptysis; No shortness of breath  CARDIOVASCULAR: No chest pain or palpitations  GASTROINTESTINAL: No abdominal or epigastric pain. No nausea, vomiting, or hematemesis; No diarrhea or constipation. No melena or hematochezia.  GENITOURINARY: No dysuria, frequency or hematuria  NEUROLOGICAL: R side weakness   SKIN: No itching, burning, rashes, or lesions   All other review of systems is negative unless indicated above.

## 2020-09-07 NOTE — CONSULT NOTE ADULT - ASSESSMENT
68 y/o male with PMHx of metastatic lung cancer (left perihilar/aorticopulmonary metastatic LAD) on chemotherapy, atrial fibrillation with RVR presents as code stroke for acute change in mental status per wife at bedside. LKN 9/6/20, NIHSS 20, preMRS 1, CT head done demonstrates 4.3 x 3.1 hypodense lesion in L temporoparietal area w/ extensive surrounding edema. 4mm midline shift, uncal involvement; no hemorrhage. Neurological exam is significant for R hemiparesis, R neglect, and expressive>receptive aphasia. Patient has new metastatic brain lesion from primary lung adenocarcinoma, presentation complicated by possible toxic metabolic encephalopathy 2/2 sepsis or new-onset seizures.    Recommendations:  -Neurosurgery recs appreciated- no neurosx intervention per wife's wishes  -MRI brain w/w/o contrast  -STAT dose of IV Decadron 24mg  -Continue maintenance IV Decadron 8mg q8h  -s/p IV Keppra 1,000mg, continue Keppra 500mg BID x 2 weeks   -Follow up toxic/metabolic infectious workup  -Neurochecks q2h  -Fall, aspiration, seizure precautions    Plan discussed with Dr. Morrison, Neurology Attending.    Thank you.    Reanna Phillips,   PGY-2  Neurology Resident

## 2020-09-07 NOTE — ED ADULT NURSE REASSESSMENT NOTE - NS ED NURSE REASSESS COMMENT FT1
Pt received from [facility] in [place]. Pt remains in the ED. Resting comfortably in bed. NAD. AOx1. Pt lethargic. Breathing unlabored and spontaneously, sating 95% on high flow. No peripheral edema. Peripheral pulses strong bilaterally. Abdomen soft, nontender and nondistended. Positive bowel sounds in all 4 quadrants. Pt safety maintained. Family at the bedside. Awaiting bed. Will continue to monitor.

## 2020-09-07 NOTE — H&P ADULT - ASSESSMENT
69 m with    New brain mass probable metastatic lesion  - Neurosurgery evaluation  - Neurology evaluation  - steroids  - Keppra  - neuro checks    MS change   - probably related to brain mass    Lung cancer  - Oncology evaluation [facility]  - CT abdomen/ pelvis to eval mets    Pneumonia  - panculture  - antibiotics  - Pulmonary evaluation Dr. Velazquez  - ID evaluation     A Fib  - rate control  - hold AC 2 to brain mass    Seizures control    DVT prophylaxis with PAS    DNR/ MOLST in chart    Further action as per clinical course     d/w wife at bedside    Mohit Lim MD pager [phone]

## 2020-09-07 NOTE — PATIENT PROFILE ADULT - NSPROPASSIVESMOKEEXPOSURE_GEN_A_NUR
Subjective:       Patient ID: Jovanny Olmedo is a 82 y.o. male.    Chief Complaint: Elbow Pain (x 3 wks)    Pt is a 82 year old HTN and Lipid issues. Fell on the right elbow about 3 weeks ago. Now has a bursa that has improved. BP is well controlled    Review of Systems   Constitutional: Negative.    Respiratory: Negative.    Cardiovascular: Negative.    Genitourinary: Negative.    Musculoskeletal: Negative.    Skin: Negative.    Neurological: Negative.    Psychiatric/Behavioral: Negative.        Objective:      Physical Exam   Constitutional: He is oriented to person, place, and time. He appears well-developed and well-nourished.   Cardiovascular: Normal rate and regular rhythm. Exam reveals no friction rub.   No murmur heard.  Pulmonary/Chest: Effort normal and breath sounds normal. No stridor. He has no wheezes.   Abdominal: Soft. Bowel sounds are normal.   Musculoskeletal:        Right elbow: He exhibits swelling and effusion.   Neurological: He is alert and oriented to person, place, and time.   Skin: Skin is warm and dry.       Assessment:       1. Coronary artery disease, occlusive    2. Secondary hyperparathyroidism    3. Sacroiliitis    4. Essential hypertension    5. Elbow pain, right        Plan:       Coronary artery disease, occlusive  Comments:  stable    Secondary hyperparathyroidism  Comments:  Stable will check calcium    Sacroiliitis  Comments:  stable    Essential hypertension  Comments:  BP is stable  Orders:  -     Comprehensive metabolic panel; Future; Expected date: 05/23/2019  -     CBC auto differential; Future; Expected date: 05/23/2019  -     Cancel: Lipid panel; Future; Expected date: 05/23/2019    Elbow pain, right  Comments:  Decompress. Will get a xray of the right elbow  Orders:  -     X-Ray Elbow Complete Left; Future; Expected date: 05/23/2019          No

## 2020-09-07 NOTE — CHART NOTE - NSCHARTNOTEFT_GEN_A_CORE
Called by to evaluate pt with agitation and confusion.    Seen and exam by me, ROLANO x 1 -2 at base line. As per wife, pt have worsening mental status at time.     HPI:  68yo M with PMHx of metastatic lung cancer currently on chemotherapy coming in with likely sepsis. As per EMS, pt has been having fevers at home since yesterday. Today, the patient was altered and not answering questions, with possibly some right sided weakness and so was brought in and had a code stroke called. pt c/w confusion and agitation at this time. pt taking out non-rebreather and refuse to have high flow at this time.    AMS 2/2 brain mass likely from Lung CA.  - give xanax 0.5, pt is on at bedtime at home.   - give haldol 0.5 mg IV x 1  - c/w monitoring for safety  - call psy consult in AM    - case d/w Dr Lim.

## 2020-09-07 NOTE — H&P ADULT - NSHPSOCIALHISTORY_GEN_ALL_CORE
Social History:    Marital Status:  (  x )    (   ) Single    (   )    (  )   Occupation:   Lives with: (  ) alone  (  ) children   ( x ) spouse   (  ) parents  (  ) other    Substance Use (street drugs): ( x ) never used  (  ) other:  Tobacco Usage:  (   ) never smoked   ( x  ) former smoker   (   ) current smoker  (     ) pack years  (        ) last cigarette date  Alcohol Usage: denies    (     ) Advanced Directives: (     ) None    (      ) DNR    (     ) DNI    (     ) Health Care Proxy:

## 2020-09-07 NOTE — CONSULT NOTE ADULT - SUBJECTIVE AND OBJECTIVE BOX
p ([id number])     HPI:  70yo M with PMHx of metastatic lung cancer currently on chemotherapy coming in with likely sepsis. As per EMS, pt has been having fevers at home since yesterday. Today, the patient was altered and not answering questions, with possibly some right sided weakness and so was brought in and had a code stroke called.    Imaging:  CTH 9/7: 4.3 x 3.1 hypodense lesion in L temporoparietal area w/ extensive surr edema. 4mm MLS. No eo hemorrhage.     CTA 9/7: (stroke protocol) neg for lvo/stenosis, lung findings c/f infection.     Exam: EOV, pupils 3mm reactive b/l, L gaze deviation, FC Lt side (squeeze hand, moves feet). LUE ag, LLE spont, RUE tonic flexed, doesn't w/d, RLE nothing.      Vital Signs Last 24 Hrs  T(C): 38 (07 Sep 2020 14:27), Max: 38 (07 Sep 2020 14:27)  T(F): 100.4 (07 Sep 2020 14:27), Max: 100.4 (07 Sep 2020 14:27)  HR: 77 (07 Sep 2020 14:27) (77 - 77)  BP: 108/68 (07 Sep 2020 14:27) (108/68 - 108/68)  BP(mean): --  RR: 18 (07 Sep 2020 14:27) (14 - 18)  SpO2: 100% (07 Sep 2020 14:27) (98% - 100%)    --Anticoagulation: Eliquis (last taken this AM)    =====================  PAST MEDICAL HISTORY   Lymphadenopathy  Occupational exposure to toxic agent  Irritable bowel syndrome  Nerve damage  Inguinal hernia  Metastatic disease  Adenocarcinoma of lung  Spondylolisthesis  HLD (hyperlipidemia)  HTN (hypertension)  COPD (chronic obstructive pulmonary disease)    PAST SURGICAL HISTORY   History of arthroscopic knee surgery  No significant past surgical history    Keflex (Other (Severe))      MEDICATIONS:  Antibiotics:  aztreonam  IVPB 2000 milliGRAM(s) IV Intermittent once  vancomycin  IVPB 1000 milliGRAM(s) IV Intermittent once    Neuro:  acetaminophen  IVPB .. 1000 milliGRAM(s) IV Intermittent once  levETIRAcetam  IVPB 500 milliGRAM(s) IV Intermittent once    Other:      SOCIAL HISTORY:   Occupation:   Marital Status:     FAMILY HISTORY:  No pertinent family history in first degree relatives      ROS: Negative except per HPI    LABS:  PT/INR - ( 07 Sep 2020 13:07 )   PT: 24.6 sec;   INR: 2.14 ratio         PTT - ( 07 Sep 2020 13:07 )  PTT:29.8 sec                        14.4   10.47 )-----------( 263      ( 07 Sep 2020 13:07 )             44.3     09-07    140  |  101  |  12  ----------------------------<  148<H>  4.6   |  29  |  1.61<H>    Ca    8.9      07 Sep 2020 13:07    TPro  6.3  /  Alb  3.4  /  TBili  0.5  /  DBili  x   /  AST  41<H>  /  ALT  32  /  AlkPhos  75  09-07

## 2020-09-08 ENCOUNTER — TRANSCRIPTION ENCOUNTER (OUTPATIENT)
Age: 70
End: 2020-09-08

## 2020-09-08 VITALS
RESPIRATION RATE: 18 BRPM | DIASTOLIC BLOOD PRESSURE: 75 MMHG | OXYGEN SATURATION: 91 % | HEART RATE: 98 BPM | SYSTOLIC BLOOD PRESSURE: 111 MMHG | TEMPERATURE: 97 F

## 2020-09-08 DIAGNOSIS — C79.9 SECONDARY MALIGNANT NEOPLASM OF UNSPECIFIED SITE: ICD-10-CM

## 2020-09-08 DIAGNOSIS — J43.9 EMPHYSEMA, UNSPECIFIED: ICD-10-CM

## 2020-09-08 DIAGNOSIS — R09.02 HYPOXEMIA: ICD-10-CM

## 2020-09-08 DIAGNOSIS — R41.82 ALTERED MENTAL STATUS, UNSPECIFIED: ICD-10-CM

## 2020-09-08 DIAGNOSIS — J18.9 PNEUMONIA, UNSPECIFIED ORGANISM: ICD-10-CM

## 2020-09-08 LAB
BASE EXCESS BLDA CALC-SCNC: 2.8 MMOL/L — HIGH (ref -2–2)
CO2 BLDA-SCNC: 29 MMOL/L — SIGNIFICANT CHANGE UP (ref 22–30)
CULTURE RESULTS: NO GROWTH — SIGNIFICANT CHANGE UP
GAS PNL BLDA: SIGNIFICANT CHANGE UP
GAS PNL BLDV: SIGNIFICANT CHANGE UP
HCO3 BLDA-SCNC: 28 MMOL/L — SIGNIFICANT CHANGE UP (ref 21–29)
PCO2 BLDA: 48 MMHG — HIGH (ref 32–46)
PH BLDA: 7.39 — SIGNIFICANT CHANGE UP (ref 7.35–7.45)
PO2 BLDA: 136 MMHG — HIGH (ref 74–108)
SAO2 % BLDA: 99 % — HIGH (ref 92–96)
SARS-COV-2 IGG SERPL QL IA: NEGATIVE — SIGNIFICANT CHANGE UP
SARS-COV-2 IGM SERPL IA-ACNC: <3.8 AU/ML — SIGNIFICANT CHANGE UP
SPECIMEN SOURCE: SIGNIFICANT CHANGE UP

## 2020-09-08 PROCEDURE — 82962 GLUCOSE BLOOD TEST: CPT

## 2020-09-08 PROCEDURE — 82803 BLOOD GASES ANY COMBINATION: CPT

## 2020-09-08 PROCEDURE — 70450 CT HEAD/BRAIN W/O DYE: CPT

## 2020-09-08 PROCEDURE — 70498 CT ANGIOGRAPHY NECK: CPT

## 2020-09-08 PROCEDURE — 85730 THROMBOPLASTIN TIME PARTIAL: CPT

## 2020-09-08 PROCEDURE — 84443 ASSAY THYROID STIM HORMONE: CPT

## 2020-09-08 PROCEDURE — 85027 COMPLETE CBC AUTOMATED: CPT

## 2020-09-08 PROCEDURE — 87150 DNA/RNA AMPLIFIED PROBE: CPT

## 2020-09-08 PROCEDURE — 70496 CT ANGIOGRAPHY HEAD: CPT

## 2020-09-08 PROCEDURE — 99223 1ST HOSP IP/OBS HIGH 75: CPT

## 2020-09-08 PROCEDURE — 94640 AIRWAY INHALATION TREATMENT: CPT

## 2020-09-08 PROCEDURE — 92610 EVALUATE SWALLOWING FUNCTION: CPT

## 2020-09-08 PROCEDURE — 87040 BLOOD CULTURE FOR BACTERIA: CPT

## 2020-09-08 PROCEDURE — 99223 1ST HOSP IP/OBS HIGH 75: CPT | Mod: GC

## 2020-09-08 PROCEDURE — 87086 URINE CULTURE/COLONY COUNT: CPT

## 2020-09-08 PROCEDURE — U0003: CPT

## 2020-09-08 PROCEDURE — 87181 SC STD AGAR DILUTION PER AGT: CPT

## 2020-09-08 PROCEDURE — 84484 ASSAY OF TROPONIN QUANT: CPT

## 2020-09-08 PROCEDURE — 85018 HEMOGLOBIN: CPT

## 2020-09-08 PROCEDURE — 80053 COMPREHEN METABOLIC PANEL: CPT

## 2020-09-08 PROCEDURE — 84132 ASSAY OF SERUM POTASSIUM: CPT

## 2020-09-08 PROCEDURE — 86769 SARS-COV-2 COVID-19 ANTIBODY: CPT

## 2020-09-08 PROCEDURE — 82947 ASSAY GLUCOSE BLOOD QUANT: CPT

## 2020-09-08 PROCEDURE — 81001 URINALYSIS AUTO W/SCOPE: CPT

## 2020-09-08 PROCEDURE — 82435 ASSAY OF BLOOD CHLORIDE: CPT

## 2020-09-08 PROCEDURE — 82330 ASSAY OF CALCIUM: CPT

## 2020-09-08 PROCEDURE — 71250 CT THORAX DX C-: CPT

## 2020-09-08 PROCEDURE — 85610 PROTHROMBIN TIME: CPT

## 2020-09-08 PROCEDURE — 36600 WITHDRAWAL OF ARTERIAL BLOOD: CPT

## 2020-09-08 PROCEDURE — 85014 HEMATOCRIT: CPT

## 2020-09-08 PROCEDURE — 83605 ASSAY OF LACTIC ACID: CPT

## 2020-09-08 PROCEDURE — 84145 PROCALCITONIN (PCT): CPT

## 2020-09-08 PROCEDURE — 99222 1ST HOSP IP/OBS MODERATE 55: CPT

## 2020-09-08 PROCEDURE — 84295 ASSAY OF SERUM SODIUM: CPT

## 2020-09-08 RX ORDER — DEXAMETHASONE 0.5 MG/5ML
4 ELIXIR ORAL
Qty: 0 | Refills: 0 | DISCHARGE
Start: 2020-09-08

## 2020-09-08 RX ORDER — AMLODIPINE BESYLATE 2.5 MG/1
1 TABLET ORAL
Qty: 0 | Refills: 0 | DISCHARGE
Start: 2020-09-08

## 2020-09-08 RX ORDER — ALBUTEROL 90 UG/1
2 AEROSOL, METERED ORAL
Qty: 0 | Refills: 0 | DISCHARGE

## 2020-09-08 RX ORDER — QUINAPRIL HYDROCHLORIDE 40 MG/1
1 TABLET, FILM COATED ORAL
Qty: 0 | Refills: 0 | DISCHARGE

## 2020-09-08 RX ORDER — AZTREONAM 2 G
500 VIAL (EA) INJECTION
Qty: 0 | Refills: 0 | DISCHARGE
Start: 2020-09-08

## 2020-09-08 RX ORDER — VANCOMYCIN HCL 1 G
500 VIAL (EA) INTRAVENOUS
Qty: 0 | Refills: 0 | DISCHARGE
Start: 2020-09-08

## 2020-09-08 RX ORDER — FUROSEMIDE 40 MG
1 TABLET ORAL
Qty: 0 | Refills: 0 | DISCHARGE

## 2020-09-08 RX ORDER — PANTOPRAZOLE SODIUM 20 MG/1
1 TABLET, DELAYED RELEASE ORAL
Qty: 0 | Refills: 0 | DISCHARGE
Start: 2020-09-08

## 2020-09-08 RX ORDER — UMECLIDINIUM BROMIDE AND VILANTEROL TRIFENATATE 62.5; 25 UG/1; UG/1
1 POWDER RESPIRATORY (INHALATION)
Qty: 0 | Refills: 0 | DISCHARGE

## 2020-09-08 RX ORDER — METOPROLOL TARTRATE 50 MG
1 TABLET ORAL
Qty: 0 | Refills: 0 | DISCHARGE
Start: 2020-09-08

## 2020-09-08 RX ORDER — VANCOMYCIN HCL 1 G
500 VIAL (EA) INTRAVENOUS EVERY 24 HOURS
Refills: 0 | Status: DISCONTINUED | OUTPATIENT
Start: 2020-09-08 | End: 2020-09-08

## 2020-09-08 RX ORDER — LISINOPRIL 2.5 MG/1
1 TABLET ORAL
Qty: 0 | Refills: 0 | DISCHARGE
Start: 2020-09-08

## 2020-09-08 RX ORDER — AMLODIPINE BESYLATE 2.5 MG/1
1 TABLET ORAL
Qty: 0 | Refills: 0 | DISCHARGE

## 2020-09-08 RX ORDER — DIGOXIN 250 MCG
1 TABLET ORAL
Qty: 0 | Refills: 0 | DISCHARGE
Start: 2020-09-08

## 2020-09-08 RX ORDER — PANTOPRAZOLE SODIUM 20 MG/1
1 TABLET, DELAYED RELEASE ORAL
Qty: 0 | Refills: 0 | DISCHARGE

## 2020-09-08 RX ORDER — HALOPERIDOL DECANOATE 100 MG/ML
0.5 INJECTION INTRAMUSCULAR ONCE
Refills: 0 | Status: COMPLETED | OUTPATIENT
Start: 2020-09-08 | End: 2020-09-08

## 2020-09-08 RX ORDER — IPRATROPIUM/ALBUTEROL SULFATE 18-103MCG
3 AEROSOL WITH ADAPTER (GRAM) INHALATION
Qty: 0 | Refills: 0 | DISCHARGE
Start: 2020-09-08

## 2020-09-08 RX ORDER — ACETAMINOPHEN 500 MG
2 TABLET ORAL
Qty: 0 | Refills: 0 | DISCHARGE
Start: 2020-09-08

## 2020-09-08 RX ADMIN — Medication 4 MILLIGRAM(S): at 11:01

## 2020-09-08 RX ADMIN — Medication 100 MILLIGRAM(S): at 10:05

## 2020-09-08 RX ADMIN — Medication 4 MILLIGRAM(S): at 17:24

## 2020-09-08 RX ADMIN — Medication 3 MILLILITER(S): at 11:00

## 2020-09-08 RX ADMIN — Medication 50 MILLIGRAM(S): at 06:01

## 2020-09-08 RX ADMIN — Medication 50 MILLIGRAM(S): at 13:29

## 2020-09-08 RX ADMIN — Medication 0.12 MILLIGRAM(S): at 06:01

## 2020-09-08 RX ADMIN — Medication 3 MILLILITER(S): at 17:24

## 2020-09-08 RX ADMIN — Medication 3 MILLILITER(S): at 06:00

## 2020-09-08 RX ADMIN — AMLODIPINE BESYLATE 5 MILLIGRAM(S): 2.5 TABLET ORAL at 06:01

## 2020-09-08 RX ADMIN — Medication 4 MILLIGRAM(S): at 06:01

## 2020-09-08 RX ADMIN — HALOPERIDOL DECANOATE 0.5 MILLIGRAM(S): 100 INJECTION INTRAMUSCULAR at 07:30

## 2020-09-08 RX ADMIN — PANTOPRAZOLE SODIUM 40 MILLIGRAM(S): 20 TABLET, DELAYED RELEASE ORAL at 06:01

## 2020-09-08 RX ADMIN — LISINOPRIL 20 MILLIGRAM(S): 2.5 TABLET ORAL at 06:01

## 2020-09-08 RX ADMIN — Medication 100 MILLIGRAM(S): at 06:01

## 2020-09-08 NOTE — SWALLOW BEDSIDE ASSESSMENT ADULT - SWALLOW EVAL: PATIENT/FAMILY GOALS STATEMENT
History received from pt's spouse (former SLP). Per spouse pt noted with dysphagia during one meal on the Friday prior to admittance. States pt was eating matzoh ball soup and did not swallow the noodles. She noted coughing as well. Spouse reports modifying the soup by removing the noodles and that no more difficulty was noted. Denies noting any further dysphagia. Denies previous dysphagia w/u, h/o pnas.

## 2020-09-08 NOTE — PROGRESS NOTE ADULT - SUBJECTIVE AND OBJECTIVE BOX
Patient is a 69y old  Male who presents with a chief complaint of     SUBJECTIVE / OVERNIGHT EVENTS: more awake.  Review of Systems  chest pain no  palpitations no  sob no  nausea no  headache no    MEDICATIONS  (STANDING):  albuterol/ipratropium for Nebulization 3 milliLiter(s) Nebulizer every 6 hours  amLODIPine   Tablet 5 milliGRAM(s) Oral daily  aztreonam  IVPB 500 milliGRAM(s) IV Intermittent every 8 hours  dexAMETHasone  Injectable 4 milliGRAM(s) IV Push every 6 hours  digoxin     Tablet 0.125 milliGRAM(s) Oral daily  lisinopril 20 milliGRAM(s) Oral daily  metoprolol succinate  milliGRAM(s) Oral daily  pantoprazole    Tablet 40 milliGRAM(s) Oral before breakfast  vancomycin  IVPB 500 milliGRAM(s) IV Intermittent every 24 hours    MEDICATIONS  (PRN):  acetaminophen   Tablet .. 650 milliGRAM(s) Oral every 6 hours PRN Temp greater or equal to 38.5C (101.3F), Mild Pain (1 - 3)      Vital Signs Last 24 Hrs  T(C): 36.3 (08 Sep 2020 11:29), Max: 37.6 (07 Sep 2020 15:27)  T(F): 97.3 (08 Sep 2020 11:29), Max: 99.7 (07 Sep 2020 15:27)  HR: 61 (08 Sep 2020 11:29) (51 - 67)  BP: 106/70 (08 Sep 2020 11:29) (82/62 - 114/76)  BP(mean): 77 (07 Sep 2020 17:27) (67 - 77)  RR: 18 (08 Sep 2020 11:29) (11 - 18)  SpO2: 97% (08 Sep 2020 13:03) (93% - 100%)    PHYSICAL EXAM:  GENERAL: NAD  HEAD:  Atraumatic, Normocephalic  EYES: EOMI, PERRLA, conjunctiva and sclera clear  NECK: Supple, No JVD  CHEST/LUNG: Clear to auscultation bilaterally; No wheeze  HEART: Regular rate and rhythm; No murmurs, rubs, or gallops  ABDOMEN: Soft, Nontender, Nondistended; Bowel sounds present  EXTREMITIES:  2+ Peripheral Pulses, No clubbing, cyanosis, or edema  PSYCH: confused  NEUROLOGY: non-focal  SKIN: No rashes or lesions    LABS:                        14.4   10.47 )-----------( 263      ( 07 Sep 2020 13:07 )             44.3         140  |  101  |  12  ----------------------------<  148<H>  4.6   |  29  |  1.61<H>    Ca    8.9      07 Sep 2020 13:07    TPro  6.3  /  Alb  3.4  /  TBili  0.5  /  DBili  x   /  AST  41<H>  /  ALT  32  /  AlkPhos  75  09-07    PT/INR - ( 07 Sep 2020 13:07 )   PT: 24.6 sec;   INR: 2.14 ratio         PTT - ( 07 Sep 2020 13:07 )  PTT:29.8 sec      Urinalysis Basic - ( 07 Sep 2020 18:38 )    Color: Yellow / Appearance: Clear / S.040 / pH: x  Gluc: x / Ketone: Negative  / Bili: Negative / Urobili: Negative   Blood: x / Protein: 30 mg/dL / Nitrite: Negative   Leuk Esterase: Negative / RBC: 5 /hpf / WBC 9 /HPF   Sq Epi: x / Non Sq Epi: 3 /hpf / Bacteria: Negative          RADIOLOGY & ADDITIONAL TESTS:    Imaging Personally Reviewed:    Consultant(s) Notes Reviewed:      Care Discussed with Consultants/Other Providers:

## 2020-09-08 NOTE — CONSULT NOTE ADULT - SUBJECTIVE AND OBJECTIVE BOX
HPI:   Patient is a 69y male with past medical history  of metastatic lung cancer currently on chemotherapy coming in for fever and confusion. As per EMS, pt has been having fevers at home since day prior to admission. The patient was altered and not answering questions, with possibly some right sided weakness and so was brought in and had a code stroke called.  The patient has been admitted to the medicine floors, the patient is confused and trying to get out of bed he has a PCA at the bedside for observation.     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  Irritable bowel syndrome  Nerve damage: posterior cervical region of neck  Inguinal hernia  Metastatic disease  Adenocarcinoma of lung  Spondylolisthesis  HLD (hyperlipidemia)  HTN (hypertension)  COPD (chronic obstructive pulmonary disease)  History of arthroscopic knee surgery      Allergies    Keflex (Other (Severe))    Intolerances            FAMILY HISTORY:  No pertinent family history in first degree relatives      SOCIAL HISTORY:  Smoking: former    ETOH:  denies    Drug Use: denies   : yes     T(F): 97.5 (20 @ 04:00), Max: 100.4 (20 @ 14:27)  HR: 59 (20 @ 05:55)  BP: 114/76 (20 @ 05:55)  RR: 18 (20 @ 05:46)  SpO2: 96% (20 @ 05:46)  Wt(kg): --    PHYSICAL EXAM:  General: no acute distress  Eyes:  anicteric, no conjunctival injection, no discharge  Oropharynx: no lesions or injection 	  Neck: supple, without adenopathy  Lungs: clear to auscultation  Heart: regular rate and rhythm; no murmur, rubs or gallops  Abdomen: soft, nondistended, nontender, without mass or organomegaly  Skin: no lesions  Extremities: no clubbing, cyanosis, or edema  Neurologic: awake and confused  moves all extremities    LAB RESULTS:                        14.4   10.47 )-----------( 263      ( 07 Sep 2020 13:07 )             44.3         140  |  101  |  12  ----------------------------<  148<H>  4.6   |  29  |  1.61<H>    Ca    8.9      07 Sep 2020 13:07    TPro  6.3  /  Alb  3.4  /  TBili  0.5  /  DBili  x   /  AST  41<H>  /  ALT  32  /  AlkPhos  75  09-07    LIVER FUNCTIONS - ( 07 Sep 2020 13:07 )  Alb: 3.4 g/dL / Pro: 6.3 g/dL / ALK PHOS: 75 U/L / ALT: 32 U/L / AST: 41 U/L / GGT: x           Urinalysis Basic - ( 07 Sep 2020 18:38 )    Color: Yellow / Appearance: Clear / S.040 / pH: x  Gluc: x / Ketone: Negative  / Bili: Negative / Urobili: Negative   Blood: x / Protein: 30 mg/dL / Nitrite: Negative   Leuk Esterase: Negative / RBC: 5 /hpf / WBC 9 /HPF   Sq Epi: x / Non Sq Epi: 3 /hpf / Bacteria: Negative        MICROBIOLOGY:  RECENT CULTURES:        RADIOLOGY REVIEWED:      Antimicrobials Day #    aztreonam  IVPB 500 milliGRAM(s) IV Intermittent every 8 hours    Other Medications:  acetaminophen   Tablet .. 650 milliGRAM(s) Oral every 6 hours PRN  albuterol/ipratropium for Nebulization 3 milliLiter(s) Nebulizer every 6 hours  amLODIPine   Tablet 5 milliGRAM(s) Oral daily  dexAMETHasone  Injectable 4 milliGRAM(s) IV Push every 6 hours  digoxin     Tablet 0.125 milliGRAM(s) Oral daily  lisinopril 20 milliGRAM(s) Oral daily  metoprolol succinate  milliGRAM(s) Oral daily  pantoprazole    Tablet 40 milliGRAM(s) Oral before breakfast

## 2020-09-08 NOTE — DISCHARGE NOTE PROVIDER - NSDCMRMEDTOKEN_GEN_ALL_CORE_FT
acetaminophen 325 mg oral tablet: 2 tab(s) orally every 6 hours, As needed, Temp greater or equal to 38.5C (101.3F), Mild Pain (1 - 3)  ALPRAZolam 0.5 mg oral tablet: 1 tab(s) orally once a day (at bedtime)  amLODIPine 5 mg oral tablet: 1 tab(s) orally once a day  aztreonam: 500 milligram(s) intravenous every 8 hours  dexamethasone: 4 milligram(s) intravenous every 6 hours  digoxin 125 mcg (0.125 mg) oral tablet: 1 tab(s) orally once a day  ipratropium-albuterol 0.5 mg-2.5 mg/3 mLinhalation solution: 3 milliliter(s) inhaled every 6 hours  lisinopril 20 mg oral tablet: 1 tab(s) orally once a day  metoprolol succinate 100 mg oral tablet, extended release: 1 tab(s) orally once a day  pantoprazole 40 mg oral delayed release tablet: 1 tab(s) orally once a day (before a meal)  Senna 8.6 mg oral tablet: 2 tab(s) orally once a day (at bedtime)  vancomycin 500 mg intravenous injection: 500 milligram(s) intravenous every 24 hours

## 2020-09-08 NOTE — SWALLOW BEDSIDE ASSESSMENT ADULT - SLP GENERAL OBSERVATIONS
Pt received in bed. A&Ox1 (to self only). Vocal quality hoarse/dysphonic. Vocal volume reduced. Speech intelligibility poor to fair. Nonsensical verbal output. Reduced command following.

## 2020-09-08 NOTE — DISCHARGE NOTE PROVIDER - HOSPITAL COURSE
70yo M with PMHx of metastatic lung cancer currently on chemotherapy coming in with likely sepsis. As per EMS, pt has been having fevers at home since yesterday. Patient presented to [facility] with altered mental status.     Code stroke was called and patient had CT head angio done. Pt was found to have a 4.3 x 3.1 cm hypodense lesion within the left parietotemporal lobes with extensive surrounding low-attenuation change within the white matter and mass effect with associated 4 mm left rightward shift of the midline structures, new since 9/26/2019, concerning for metastatic disease or less likely primary brain tumor. CT chest also with suspected PNA.         # Metastatic lung adenocarcinoma    - Dr. Lim and ACP team spoke to Dr. Washington at [facility]. Patient to be transferred to [facility] neurosurgery team there who is willing to perform metastatectomy after evaluating the patient. Patient to be admitted under Dr. Ashok Gill. Patient's wife Vee in agreement with transfer and plan of care.     - On vanco+Aztreonam for suspected pneumonia, management per primary team.        DCP with medication reconciliation discussed with Dr. Lim.     Patient stable for transport to [facility].

## 2020-09-08 NOTE — CONSULT NOTE ADULT - ASSESSMENT
70yo M with PMHx of metastatic lung cancer currently on chemotherapy coming in with AMS, found to have a new brain lesion concerning for metastasis.        NOTE TO BE UPDATED. 70yo M with PMHx of metastatic lung cancer currently on chemotherapy coming in with AMS, found to have a new brain lesion concerning for metastasis.      # Metastatic lung adenocarcinoma  - Spoke to Dr. Rodgers and primary oncologist Dr. Washington at [facility]. Patient was referred to [facility] neurosurgery team there who is willing to perform metastatectomy after evaluating the patient.  - Dr. Washington asked patient to be transferred to [facility], speaking to Dr. Lim and [facility] will arrange for the transfer.  - Hold experimental agent while in the hospital. No plan for inpatient chemotherapy.  - Neuro input appreciated, continue dexamethasone and antiepileptics per their recommendation.  - On vanco+Aztreonam for suspected pneumonia, management per primary team.  - Dispo pending per [facility] transfer status, please communicate with Dr. Washington.        Enedelia Haddad MD  PGY 5, Oncology/Hematology fellow  (P) [phone]  After 5pm, please contact on-call team. 68yo M with PMHx of metastatic lung cancer currently on chemotherapy coming in with AMS, found to have a new brain lesion concerning for metastasis.      # Metastatic lung adenocarcinoma  - Spoke to Dr. Rodgers and primary oncologist Dr. Washington at [facility]. Patient was referred to [facility] neurosurgery team there who is willing to perform metastatectomy after evaluating the patient.  - Dr. Washington asked patient to be transferred to [facility], speaking to Dr. Lim and [facility] will arrange for the transfer.  - Hold experimental agent while in the hospital. No plan for inpatient chemotherapy.  - Neuro input appreciated, continue dexamethasone and antiepileptics per their recommendation.  - Please perform MRI of the brain with IV contrast to better evaluate the mass.  - On vanco+Aztreonam for suspected pneumonia, management per primary team.  - Dispo pending per [facility] transfer status, please communicate with Dr. Washington.        Enedelia Haddad MD  PGY 5, Oncology/Hematology fellow  (P) [phone]  After 5pm, please contact on-call team. 68yo M with PMHx of metastatic lung cancer currently on chemotherapy coming in with AMS, found to have a new brain lesion concerning for metastasis.      # Metastatic lung adenocarcinoma  - Spoke to Dr. Rodgers and primary oncologist Dr. Washington at [facility]. Patient was referred to [facility] neurosurgery team there who is willing to perform metastatectomy after evaluating the patient.  - Dr. Washington asked patient to be transferred to [facility], speaking to Dr. Lim and [facility] will arrange for the transfer.  - Hold experimental agent while in the hospital. No plan for inpatient chemotherapy.  - Neuro input appreciated, continue dexamethasone and antiepileptics per their recommendation.  - On vanco+Aztreonam for suspected pneumonia, management per primary team.  - Discussed with the primary team, Dr. Washington, and wife at bedside. Patient is pending transfer to [facility] for further management for the brain metastatic lesion.        Enedelia Haddad MD  PGY 5, Oncology/Hematology fellow  (P) [phone]  After 5pm, please contact on-call team.

## 2020-09-08 NOTE — DISCHARGE NOTE PROVIDER - NSDCCPCAREPLAN_GEN_ALL_CORE_FT
PRINCIPAL DISCHARGE DIAGNOSIS  Diagnosis: Primary lung adenocarcinoma  Assessment and Plan of Treatment: New 4.3 x 3.1 cm hypodense lesion within the left parietotemporal lobes with extensive surrounding low-attenuation change within the white matter and mass effect with associated 4 mm left rightward shift of the midline structures, new since 9/26/2019, concerning for metastatic disease or less likely primary brain tumor.  Transfer to [facility] for oncology and neurosurgical evaluation      SECONDARY DISCHARGE DIAGNOSES  Diagnosis: Pneumonia  Assessment and Plan of Treatment: CT Chest with extensive patchy airspace opacities in the left lung, with bronchovascular wall thickening, suspicious for pneumonia.  On vanco+Aztreonam for suspected pneumonia

## 2020-09-08 NOTE — DISCHARGE NOTE PROVIDER - CARE PROVIDER_API CALL
FLORIDALMA WOLF J  Gastroenterology  [address], [address]  [place] [address]  Phone: [phone]  Fax: [phone]  Follow Up Time:

## 2020-09-08 NOTE — CONSULT NOTE ADULT - PROBLEM SELECTOR RECOMMENDATION 3
metastatic lung adenocarcinoma   -CT head with new 4.3 x 3.1 cm hypodense lesion within the left parietotemporal lobes  -Decadron per neuro  -Neuro f/u  -Heme/onc f/u  -DNR/DNI

## 2020-09-08 NOTE — CONSULT NOTE ADULT - PROBLEM SELECTOR RECOMMENDATION 2
2nd to PNA  -High flow d/c'd  -C/w 6LNC continuous. Wean O2 as tolerated, keep sats >90%  -Check VBG 2nd to PNA  -High flow d/c'd  -C/w 6LNC continuous. Wean O2 as tolerated, keep sats >90%  -Repeat VBG with pH 7.26. ABG checked pH 7.39/48/136/28

## 2020-09-08 NOTE — CONSULT NOTE ADULT - ATTENDING COMMENTS
The patient was seen and examined at bedside during Neurology rounds.   He is alert, but not oriented to place or time. He is oriented to person. He is unable to follow complex commands, but follows simple commands relatively consistently. No localizing weakness was appreciated on examination today. Reflexes were diffusely hyperreflexic with cross adductors and mild 2 beat clonus on the left. Chavez's sign was present bilaterally.     Recommendations as outlined in assessment above.
Agree with above. Pt evaluated at bedside with wife present.     69 M w/ metastatic NSCL CA on clinical trial with KRAS inhibitor, a/w AMS found to have new intracranial met. Plan for transfer to [facility] under neurosurgical service to be evaluated for resection. Discussed extensively with wife at bedside. Emotional support provided.

## 2020-09-08 NOTE — SWALLOW BEDSIDE ASSESSMENT ADULT - COMMENTS
Hx cont: Neuro: [facility] 9/6/20, NIHSS 20, preMRS 1, CT head done demonstrates 4.3 x 3.1 hypodense lesion in L temporoparietal area w/ extensive surrounding edema. 4mm midline shift, uncal involvement; no hemorrhage.  Neurological exam is significant for R hemiparesis, R neglect, and expressive>receptive aphasia. Patient has new metastatic brain lesion from primary lung adenocarcinoma, presentation complicated by possible toxic metabolic encephalopathy 2/2 sepsis or new-onset seizures. Neurosx: patient is not a candidate for a surgery. ID on board for low grade fever and possible pna->Abx. Pulm: CT chest suggestive of PNA. CT head with brain mass. Pt hypoxic on arrival requiring high flow, now transitioned to nasal cannula (6L). Hypoxia 2nd to PNA. AMS likely 2nd to brain mass + underlying infection. +Emphysema/COPD. Event note 9/7: pt c/w confusion and agitation at this time. CT chest: IMPRESSION: Interval decrease in size of left upper lobe mass with pleural extension. Extensive patchy airspace opacities in the left lung, with bronchovascular wall thickening, suspicious for pneumonia. Increased nodular right adrenal thickening, not well evaluated on noncontrast CT.

## 2020-09-08 NOTE — CHART NOTE - NSCHARTNOTEFT_GEN_A_CORE
Patient is 70 y/o M with pmhx metastatic lung cancer admitted with AMS likely 2/2 brain metastases.   Patient underwent speech and swallow evaluation and NPO was recommended.   Spoke to patient's wife Vee at bedside regarding diet recommendation. She states she has worked as a speech and language pathologist and wishes to have her  eat a mechanical soft diet.  I explained the risks of starting diet, up to and including aspiration and death. Ms. Baxter verbalizes understanding and wishes to proceed with diet.  Patient's family and nursing staff educated about aspiration precautions.  Please continue to ensure patient safety.     Liz Vanegas PA-C  Dept of Medicine

## 2020-09-08 NOTE — CONSULT NOTE ADULT - SUBJECTIVE AND OBJECTIVE BOX
Oncology Consult Note    HPI:  70yo M with PMHx of metastatic lung cancer currently on chemotherapy coming in with likely sepsis. As per EMS, pt has been having fevers at home since yesterday. Today, the patient was altered and not answering questions, with possibly some right sided weakness and so was brought in and had a code stroke called. Onc Dr Washington at [facility] in [place]  cell (07 Sep 2020 17:38)    Code stroke was called and patient had CT head angio done. Pt was found to have a 4.3 x 3.1 cm hypodense lesion within the left parietotemporal lobes with extensive surrounding low-attenuation change within the white matter and mass effect with associated 4 mm left rightward shift of the midline structures, new since 9/26/2019, concerning for metastatic disease or less likely primary brain tumor. Neurosurgery evaluated the pt and said patient is not a candidate for a surgery.    Med onc consulted for further eval.      Allergies    Keflex (Other (Severe))    Intolerances        MEDICATIONS  (STANDING):  albuterol/ipratropium for Nebulization 3 milliLiter(s) Nebulizer every 6 hours  amLODIPine   Tablet 5 milliGRAM(s) Oral daily  aztreonam  IVPB 500 milliGRAM(s) IV Intermittent every 8 hours  dexAMETHasone  Injectable 4 milliGRAM(s) IV Push every 6 hours  digoxin     Tablet 0.125 milliGRAM(s) Oral daily  lisinopril 20 milliGRAM(s) Oral daily  metoprolol succinate  milliGRAM(s) Oral daily  pantoprazole    Tablet 40 milliGRAM(s) Oral before breakfast  vancomycin  IVPB 500 milliGRAM(s) IV Intermittent every 24 hours    MEDICATIONS  (PRN):  acetaminophen   Tablet .. 650 milliGRAM(s) Oral every 6 hours PRN Temp greater or equal to 38.5C (101.3F), Mild Pain (1 - 3)      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  Irritable bowel syndrome  Nerve damage: posterior cervical region of neck  Inguinal hernia  Metastatic disease  Adenocarcinoma of lung  Spondylolisthesis  HLD (hyperlipidemia)  HTN (hypertension)  COPD (chronic obstructive pulmonary disease)  History of arthroscopic knee surgery      FAMILY HISTORY:  No pertinent family history in first degree relatives      SOCIAL HISTORY: No EtOH, no tobacco    REVIEW OF SYSTEMS:      Height (cm): 172.72 (09-07 @ 12:52)  Weight (kg): 54.4 (09-07 @ 12:52)  BMI (kg/m2): 18.2 (09-07 @ 12:52)  BSA (m2): 1.64 (09-07 @ 12:52)    T(F): 97.5 (09-08-20 @ 04:00), Max: 100.4 (09-07-20 @ 14:27)  HR: 59 (09-08-20 @ 05:55)  BP: 114/76 (09-08-20 @ 05:55)  RR: 18 (09-08-20 @ 05:46)  SpO2: 96% (09-08-20 @ 05:46)  Wt(kg): --    Physical Exam                            14.4   10.47 )-----------( 263      ( 07 Sep 2020 13:07 )             44.3       09-07    140  |  101  |  12  ----------------------------<  148<H>  4.6   |  29  |  1.61<H>    Ca    8.9      07 Sep 2020 13:07    TPro  6.3  /  Alb  3.4  /  TBili  0.5  /  DBili  x   /  AST  41<H>  /  ALT  32  /  AlkPhos  75  09-07        < from: CT Angio Head w/ IV Cont (09.07.20 @ 13:25) >    Imaged portions of the upper chest shows emphysema. Left upper lobe lung mass has decreased in size measuring 2.9 x 1.6 cm, previously 4.5 x 4.4 cm since 9/26/2019. Unchanged calcified granuloma in the superior segment of the left lower lobe. Patchy opacities in the partially imaged left upper and lower lobe.        < end of copied text >      < from: CT Brain Stroke Protocol (09.07.20 @ 13:18) >    IMPRESSION:    A 4.3 x 3.1 cm hypodense lesion within the left parietotemporal lobes with extensive surrounding low-attenuation change within the white matter and mass effect with associated 4 mm left rightward shift of the midline structures, new since 9/26/2019, concerning for metastatic disease or less likely primary brain tumor.    < end of copied text > Oncology Consult Note    HPI:  70yo M with PMHx of metastatic lung cancer currently on chemotherapy coming in with likely sepsis. As per EMS, pt has been having fevers at home since yesterday. Today, the patient was altered and not answering questions, with possibly some right sided weakness and so was brought in and had a code stroke called. Onc Dr Washington at [facility] in [place]  cell (07 Sep 2020 17:38)    Code stroke was called and patient had CT head angio done. Pt was found to have a 4.3 x 3.1 cm hypodense lesion within the left parietotemporal lobes with extensive surrounding low-attenuation change within the white matter and mass effect with associated 4 mm left rightward shift of the midline structures, new since 9/26/2019, concerning for metastatic disease or less likely primary brain tumor. Neurosurgery evaluated the pt and said patient is not a candidate for a surgery.    Med onc consulted for further eval.    Oncology history  2016 found to have SHENA 6.6cm mass w/ hilar/mediastinal adenopathy and b/l nodules consistent with mets. MRI brain without contrast that time negative for metastasis. CT guided lung biopsy showed adenoca, peripheral NGS revealed KRAS mutation, PDL1 unknown. Pt was then started on Pem/Pem/carbo 3C.          Allergies    Keflex (Other (Severe))    Intolerances        MEDICATIONS  (STANDING):  albuterol/ipratropium for Nebulization 3 milliLiter(s) Nebulizer every 6 hours  amLODIPine   Tablet 5 milliGRAM(s) Oral daily  aztreonam  IVPB 500 milliGRAM(s) IV Intermittent every 8 hours  dexAMETHasone  Injectable 4 milliGRAM(s) IV Push every 6 hours  digoxin     Tablet 0.125 milliGRAM(s) Oral daily  lisinopril 20 milliGRAM(s) Oral daily  metoprolol succinate  milliGRAM(s) Oral daily  pantoprazole    Tablet 40 milliGRAM(s) Oral before breakfast  vancomycin  IVPB 500 milliGRAM(s) IV Intermittent every 24 hours    MEDICATIONS  (PRN):  acetaminophen   Tablet .. 650 milliGRAM(s) Oral every 6 hours PRN Temp greater or equal to 38.5C (101.3F), Mild Pain (1 - 3)      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  Irritable bowel syndrome  Nerve damage: posterior cervical region of neck  Inguinal hernia  Metastatic disease  Adenocarcinoma of lung  Spondylolisthesis  HLD (hyperlipidemia)  HTN (hypertension)  COPD (chronic obstructive pulmonary disease)  History of arthroscopic knee surgery      FAMILY HISTORY:  No pertinent family history in first degree relatives      SOCIAL HISTORY: No EtOH, no tobacco    REVIEW OF SYSTEMS:      Height (cm): 172.72 (09-07 @ 12:52)  Weight (kg): 54.4 (09-07 @ 12:52)  BMI (kg/m2): 18.2 (09-07 @ 12:52)  BSA (m2): 1.64 (09-07 @ 12:52)    T(F): 97.5 (09-08-20 @ 04:00), Max: 100.4 (09-07-20 @ 14:27)  HR: 59 (09-08-20 @ 05:55)  BP: 114/76 (09-08-20 @ 05:55)  RR: 18 (09-08-20 @ 05:46)  SpO2: 96% (09-08-20 @ 05:46)  Wt(kg): --    Physical Exam                            14.4   10.47 )-----------( 263      ( 07 Sep 2020 13:07 )             44.3       09-07    140  |  101  |  12  ----------------------------<  148<H>  4.6   |  29  |  1.61<H>    Ca    8.9      07 Sep 2020 13:07    TPro  6.3  /  Alb  3.4  /  TBili  0.5  /  DBili  x   /  AST  41<H>  /  ALT  32  /  AlkPhos  75  09-07        < from: CT Angio Head w/ IV Cont (09.07.20 @ 13:25) >    Imaged portions of the upper chest shows emphysema. Left upper lobe lung mass has decreased in size measuring 2.9 x 1.6 cm, previously 4.5 x 4.4 cm since 9/26/2019. Unchanged calcified granuloma in the superior segment of the left lower lobe. Patchy opacities in the partially imaged left upper and lower lobe.        < end of copied text >      < from: CT Brain Stroke Protocol (09.07.20 @ 13:18) >    IMPRESSION:    A 4.3 x 3.1 cm hypodense lesion within the left parietotemporal lobes with extensive surrounding low-attenuation change within the white matter and mass effect with associated 4 mm left rightward shift of the midline structures, new since 9/26/2019, concerning for metastatic disease or less likely primary brain tumor.    < end of copied text > Oncology Consult Note    HPI:  70yo M with PMHx of metastatic lung cancer currently on chemotherapy coming in with likely sepsis. As per EMS, pt has been having fevers at home since yesterday. Today, the patient was altered and not answering questions, with possibly some right sided weakness and so was brought in and had a code stroke called. Onc Dr Washington at [facility] in [place]  cell (07 Sep 2020 17:38)    Code stroke was called and patient had CT head angio done. Pt was found to have a 4.3 x 3.1 cm hypodense lesion within the left parietotemporal lobes with extensive surrounding low-attenuation change within the white matter and mass effect with associated 4 mm left rightward shift of the midline structures, new since 9/26/2019, concerning for metastatic disease or less likely primary brain tumor. Neurosurgery evaluated the pt and said patient is not a candidate for a surgery.    Med onc consulted for further eval.      Oncology history  2016 found to have SHENA 6.6cm mass w/ hilar/mediastinal adenopathy and b/l nodules consistent with mets. MRI brain without contrast that time negative for metastasis. CT guided lung biopsy showed adenoca, peripheral NGS revealed KRAS mutation, PDL1 unknown. Pt was then started on Pem/Pem/carbo 3C. Last seen by Dr. Rodgers at her clinic in 11/2019.           Allergies    Keflex (Other (Severe))    Intolerances        MEDICATIONS  (STANDING):  albuterol/ipratropium for Nebulization 3 milliLiter(s) Nebulizer every 6 hours  amLODIPine   Tablet 5 milliGRAM(s) Oral daily  aztreonam  IVPB 500 milliGRAM(s) IV Intermittent every 8 hours  dexAMETHasone  Injectable 4 milliGRAM(s) IV Push every 6 hours  digoxin     Tablet 0.125 milliGRAM(s) Oral daily  lisinopril 20 milliGRAM(s) Oral daily  metoprolol succinate  milliGRAM(s) Oral daily  pantoprazole    Tablet 40 milliGRAM(s) Oral before breakfast  vancomycin  IVPB 500 milliGRAM(s) IV Intermittent every 24 hours    MEDICATIONS  (PRN):  acetaminophen   Tablet .. 650 milliGRAM(s) Oral every 6 hours PRN Temp greater or equal to 38.5C (101.3F), Mild Pain (1 - 3)      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  Irritable bowel syndrome  Nerve damage: posterior cervical region of neck  Inguinal hernia  Metastatic disease  Adenocarcinoma of lung  Spondylolisthesis  HLD (hyperlipidemia)  HTN (hypertension)  COPD (chronic obstructive pulmonary disease)  History of arthroscopic knee surgery      FAMILY HISTORY:  No pertinent family history in first degree relatives      SOCIAL HISTORY: No EtOH, no tobacco    REVIEW OF SYSTEMS:      Height (cm): 172.72 (09-07 @ 12:52)  Weight (kg): 54.4 (09-07 @ 12:52)  BMI (kg/m2): 18.2 (09-07 @ 12:52)  BSA (m2): 1.64 (09-07 @ 12:52)    T(F): 97.5 (09-08-20 @ 04:00), Max: 100.4 (09-07-20 @ 14:27)  HR: 59 (09-08-20 @ 05:55)  BP: 114/76 (09-08-20 @ 05:55)  RR: 18 (09-08-20 @ 05:46)  SpO2: 96% (09-08-20 @ 05:46)  Wt(kg): --    Physical Exam                            14.4   10.47 )-----------( 263      ( 07 Sep 2020 13:07 )             44.3       09-07    140  |  101  |  12  ----------------------------<  148<H>  4.6   |  29  |  1.61<H>    Ca    8.9      07 Sep 2020 13:07    TPro  6.3  /  Alb  3.4  /  TBili  0.5  /  DBili  x   /  AST  41<H>  /  ALT  32  /  AlkPhos  75  09-07        < from: CT Angio Head w/ IV Cont (09.07.20 @ 13:25) >    Imaged portions of the upper chest shows emphysema. Left upper lobe lung mass has decreased in size measuring 2.9 x 1.6 cm, previously 4.5 x 4.4 cm since 9/26/2019. Unchanged calcified granuloma in the superior segment of the left lower lobe. Patchy opacities in the partially imaged left upper and lower lobe.        < end of copied text >      < from: CT Brain Stroke Protocol (09.07.20 @ 13:18) >    IMPRESSION:    A 4.3 x 3.1 cm hypodense lesion within the left parietotemporal lobes with extensive surrounding low-attenuation change within the white matter and mass effect with associated 4 mm left rightward shift of the midline structures, new since 9/26/2019, concerning for metastatic disease or less likely primary brain tumor.    < end of copied text > Oncology Consult Note    HPI:  70yo M with PMHx of metastatic lung cancer currently on chemotherapy coming in with likely sepsis. As per EMS, pt has been having fevers at home since yesterday. Today, the patient was altered and not answering questions, with possibly some right sided weakness and so was brought in and had a code stroke called. Onc Dr Washington at [facility] in [place]  cell (07 Sep 2020 17:38)    Code stroke was called and patient had CT head angio done. Pt was found to have a 4.3 x 3.1 cm hypodense lesion within the left parietotemporal lobes with extensive surrounding low-attenuation change within the white matter and mass effect with associated 4 mm left rightward shift of the midline structures, new since 9/26/2019, concerning for metastatic disease or less likely primary brain tumor. Neurosurgery evaluated the pt and said patient is not a candidate for a surgery.    Med onc consulted for further eval.      Oncology history  2016 found to have SHENA 6.6cm mass w/ hilar/mediastinal adenopathy and b/l nodules consistent with mets. MRI brain without contrast that time negative for metastasis. CT guided lung biopsy showed adenoca, peripheral NGS revealed KRAS mutation, PDL1 unknown. Pt was then started on Pem/Pem/carbo 3C. Last seen by Dr. Rodgers at her clinic in 11/2019.   Patient has been seen by Dr. Washington at [facility] since then. Spoke to Dr. Washington() who provided more oncologic history for the patient. 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 Pt was involved in phase in clinical trial with KRAS G12C inhibitor at [facility]. Given the first experimental agent and achieved OH for 9 months. Then disease progressed, 2nd line KRAS inhibitor w/ another experimental agents [id number] 600mg BID + [id number] 20mg OD was started last Wednesday, and patient received his last dose on 9/7/20.  Of note, patient was recommended to have MRI of the brain for the surveillance however patient has been refused due to severe claustrophobia.          Allergies    Keflex (Other (Severe))    Intolerances        MEDICATIONS  (STANDING):  albuterol/ipratropium for Nebulization 3 milliLiter(s) Nebulizer every 6 hours  amLODIPine   Tablet 5 milliGRAM(s) Oral daily  aztreonam  IVPB 500 milliGRAM(s) IV Intermittent every 8 hours  dexAMETHasone  Injectable 4 milliGRAM(s) IV Push every 6 hours  digoxin     Tablet 0.125 milliGRAM(s) Oral daily  lisinopril 20 milliGRAM(s) Oral daily  metoprolol succinate  milliGRAM(s) Oral daily  pantoprazole    Tablet 40 milliGRAM(s) Oral before breakfast  vancomycin  IVPB 500 milliGRAM(s) IV Intermittent every 24 hours    MEDICATIONS  (PRN):  acetaminophen   Tablet .. 650 milliGRAM(s) Oral every 6 hours PRN Temp greater or equal to 38.5C (101.3F), Mild Pain (1 - 3)      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  Irritable bowel syndrome  Nerve damage: posterior cervical region of neck  Inguinal hernia  Metastatic disease  Adenocarcinoma of lung  Spondylolisthesis  HLD (hyperlipidemia)  HTN (hypertension)  COPD (chronic obstructive pulmonary disease)  History of arthroscopic knee surgery      FAMILY HISTORY:  No pertinent family history in first degree relatives      SOCIAL HISTORY: No EtOH, no tobacco    REVIEW OF SYSTEMS:      Height (cm): 172.72 (09-07 @ 12:52)  Weight (kg): 54.4 (09-07 @ 12:52)  BMI (kg/m2): 18.2 (09-07 @ 12:52)  BSA (m2): 1.64 (09-07 @ 12:52)    T(F): 97.5 (09-08-20 @ 04:00), Max: 100.4 (09-07-20 @ 14:27)  HR: 59 (09-08-20 @ 05:55)  BP: 114/76 (09-08-20 @ 05:55)  RR: 18 (09-08-20 @ 05:46)  SpO2: 96% (09-08-20 @ 05:46)  Wt(kg): --    Physical Exam                            14.4   10.47 )-----------( 263      ( 07 Sep 2020 13:07 )             44.3       09-07    140  |  101  |  12  ----------------------------<  148<H>  4.6   |  29  |  1.61<H>    Ca    8.9      07 Sep 2020 13:07    TPro  6.3  /  Alb  3.4  /  TBili  0.5  /  DBili  x   /  AST  41<H>  /  ALT  32  /  AlkPhos  75  09-07        < from: CT Angio Head w/ IV Cont (09.07.20 @ 13:25) >    Imaged portions of the upper chest shows emphysema. Left upper lobe lung mass has decreased in size measuring 2.9 x 1.6 cm, previously 4.5 x 4.4 cm since 9/26/2019. Unchanged calcified granuloma in the superior segment of the left lower lobe. Patchy opacities in the partially imaged left upper and lower lobe.        < end of copied text >      < from: CT Brain Stroke Protocol (09.07.20 @ 13:18) >    IMPRESSION:    A 4.3 x 3.1 cm hypodense lesion within the left parietotemporal lobes with extensive surrounding low-attenuation change within the white matter and mass effect with associated 4 mm left rightward shift of the midline structures, new since 9/26/2019, concerning for metastatic disease or less likely primary brain tumor.    < end of copied text > Oncology Consult Note    HPI:  70yo M with PMHx of metastatic lung cancer currently on chemotherapy coming in with likely sepsis. As per EMS, pt has been having fevers at home since yesterday. Today, the patient was altered and not answering questions, with possibly some right sided weakness and so was brought in and had a code stroke called. Onc Dr Washington at [facility] in [place]  cell (07 Sep 2020 17:38)    Code stroke was called and patient had CT head angio done. Pt was found to have a 4.3 x 3.1 cm hypodense lesion within the left parietotemporal lobes with extensive surrounding low-attenuation change within the white matter and mass effect with associated 4 mm left rightward shift of the midline structures, new since 9/26/2019, concerning for metastatic disease or less likely primary brain tumor. Neurosurgery evaluated the pt and said patient is not a candidate for a surgery.    Med onc consulted for further eval.      Oncology history  2016 found to have SHENA 6.6cm mass w/ hilar/mediastinal adenopathy and b/l nodules consistent with mets. MRI brain without contrast that time negative for metastasis. CT guided lung biopsy showed adenoca, peripheral NGS revealed KRAS mutation, PDL1 unknown. Pt was then started on Pem/Pem/carbo 3C. Last seen by Dr. Rodgers at her clinic in 11/2019.   Patient has been seen by Dr. Washington at [facility] since then. Spoke to Dr. Washington() who provided more oncologic history for the patient. 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 Pt was involved in phase in clinical trial with KRAS G12C inhibitor at [facility]. Given the first experimental agent and achieved DC for 9 months. Then disease progressed, 2nd line KRAS inhibitor w/ another experimental agents [id number] 600mg BID + [id number] 20mg OD was started last Wednesday, and patient received his last dose on 9/7/20.  Of note, patient was recommended to have MRI of the brain for the surveillance however patient has been refused due to severe claustrophobia.          Allergies    Keflex (Other (Severe))    Intolerances        MEDICATIONS  (STANDING):  albuterol/ipratropium for Nebulization 3 milliLiter(s) Nebulizer every 6 hours  amLODIPine   Tablet 5 milliGRAM(s) Oral daily  aztreonam  IVPB 500 milliGRAM(s) IV Intermittent every 8 hours  dexAMETHasone  Injectable 4 milliGRAM(s) IV Push every 6 hours  digoxin     Tablet 0.125 milliGRAM(s) Oral daily  lisinopril 20 milliGRAM(s) Oral daily  metoprolol succinate  milliGRAM(s) Oral daily  pantoprazole    Tablet 40 milliGRAM(s) Oral before breakfast  vancomycin  IVPB 500 milliGRAM(s) IV Intermittent every 24 hours    MEDICATIONS  (PRN):  acetaminophen   Tablet .. 650 milliGRAM(s) Oral every 6 hours PRN Temp greater or equal to 38.5C (101.3F), Mild Pain (1 - 3)      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  Irritable bowel syndrome  Nerve damage: posterior cervical region of neck  Inguinal hernia  Metastatic disease  Adenocarcinoma of lung  Spondylolisthesis  HLD (hyperlipidemia)  HTN (hypertension)  COPD (chronic obstructive pulmonary disease)  History of arthroscopic knee surgery      FAMILY HISTORY:  No pertinent family history in first degree relatives      SOCIAL HISTORY: No EtOH, no tobacco    REVIEW OF SYSTEMS: Pt denies having any pain. Confused.      Height (cm): 172.72 (09-07 @ 12:52)  Weight (kg): 54.4 (09-07 @ 12:52)  BMI (kg/m2): 18.2 (09-07 @ 12:52)  BSA (m2): 1.64 (09-07 @ 12:52)    T(F): 97.5 (09-08-20 @ 04:00), Max: 100.4 (09-07-20 @ 14:27)  HR: 59 (09-08-20 @ 05:55)  BP: 114/76 (09-08-20 @ 05:55)  RR: 18 (09-08-20 @ 05:46)  SpO2: 96% (09-08-20 @ 05:46)  Wt(kg): --    Physical Exam: referred from the internal medicine note.                            14.4   10.47 )-----------( 263      ( 07 Sep 2020 13:07 )             44.3       09-07    140  |  101  |  12  ----------------------------<  148<H>  4.6   |  29  |  1.61<H>    Ca    8.9      07 Sep 2020 13:07    TPro  6.3  /  Alb  3.4  /  TBili  0.5  /  DBili  x   /  AST  41<H>  /  ALT  32  /  AlkPhos  75  09-07        < from: CT Angio Head w/ IV Cont (09.07.20 @ 13:25) >    Imaged portions of the upper chest shows emphysema. Left upper lobe lung mass has decreased in size measuring 2.9 x 1.6 cm, previously 4.5 x 4.4 cm since 9/26/2019. Unchanged calcified granuloma in the superior segment of the left lower lobe. Patchy opacities in the partially imaged left upper and lower lobe.        < end of copied text >      < from: CT Brain Stroke Protocol (09.07.20 @ 13:18) >    IMPRESSION:    A 4.3 x 3.1 cm hypodense lesion within the left parietotemporal lobes with extensive surrounding low-attenuation change within the white matter and mass effect with associated 4 mm left rightward shift of the midline structures, new since 9/26/2019, concerning for metastatic disease or less likely primary brain tumor.    < end of copied text >

## 2020-09-08 NOTE — CONSULT NOTE ADULT - ASSESSMENT
70 yo M with PMH of metastatic lung adenocarcinoma (currently on chemotherapy), emphysema/COPD, HLD, HTN. Presents to ED with fevers x 1 day and altered mental status. CT chest suggestive of PNA. CT head with brain mass. Pt hypoxic on arrival requiring high flow, now transitioned to nasal cannula.

## 2020-09-08 NOTE — SWALLOW BEDSIDE ASSESSMENT ADULT - ADDITIONAL RECOMMENDATIONS
Maintain good oral hygiene.  Consider formal speech and language evaluation to assess speech, language and cognitive-linguistic skills.

## 2020-09-08 NOTE — CONSULT NOTE ADULT - ASSESSMENT
69y male with past medical history  of metastatic lung cancer currently on chemotherapy coming in for fever and confusion  flow sheets reviewed and in the ED the patient was found a low grade fever, CT imaging done of chest and head:  to have left lung mass, with patchy infiltrated probable pneumonia, lesion in the left parietotemporal lobe.  micro reviewed and blood cultures and urine cultures are in process  the patient has been started on antibiotics, He was given one dose of Vancomycin 1 g IV once  and is also on Aztreonam   Plan    ·	Continue Aztreonam 500 mg IV tid for empiric pneumonia coverage  ·	continue Vancomycin 500 mg IV daily renal dosing  for empiric coverage for pna and check a trough before the fourth dose   ·	follow up cultures which are in process   ·	continue supportive care as per medicine team

## 2020-09-08 NOTE — PROGRESS NOTE ADULT - ASSESSMENT
69 m with    New brain mass probable metastatic lesion  - Neurosurgery evaluation noted  - Neurology evaluation noted  - steroids  - Keppra  - neuro checks    MS change   - probably related to brain mass    Lung cancer  - Oncology evaluation [facility] requesting transfer. (Dr. Washington [phone])  - CT abdomen/ pelvis to eval mets pending     Pneumonia  - panculture  - antibiotics  - Pulmonary evaluation Dr. Velazquez  - ID evaluation     A Fib  - rate control  - hold AC 2 to brain mass    Seizures control    DVT prophylaxis with PAS    DNR/ MOLST in chart    d/w Dr. Washington and Dr Borja from [facility] re transfer.     Message left for wife.     Mohit Lim MD pager [phone]

## 2020-09-08 NOTE — CONSULT NOTE ADULT - SUBJECTIVE AND OBJECTIVE BOX
PULMONARY CONSULT    HPI: 70 yo M with PMH of metastatic lung adenocarcinoma (currently on chemotherapy), emphysema/COPD, HLD, HTN. Presents to ED with fevers x 1 day, altered mental status, and R sided weakness. Low grade fevers in ED. CT chest suggestive of PNA. CT head with brain mass. Pt hypoxic on arrival requiring high flow, now transitioned to nasal cannula. ROS unable to be obtained 2nd to mental status.     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  Irritable bowel syndrome  Nerve damage: posterior cervical region of neck  Inguinal hernia  Metastatic disease  Adenocarcinoma of lung  Spondylolisthesis  HLD (hyperlipidemia)  HTN (hypertension)  COPD (chronic obstructive pulmonary disease)  History of arthroscopic knee surgery    Allergies  Keflex (Other (Severe))      FAMILY HISTORY:  No pertinent family history in first degree relatives    Social history: former smoker    Review of Systems: unable to obtain 2nd to mental status      Medications:  MEDICATIONS  (STANDING):  albuterol/ipratropium for Nebulization 3 milliLiter(s) Nebulizer every 6 hours  amLODIPine   Tablet 5 milliGRAM(s) Oral daily  aztreonam  IVPB 500 milliGRAM(s) IV Intermittent every 8 hours  dexAMETHasone  Injectable 4 milliGRAM(s) IV Push every 6 hours  digoxin     Tablet 0.125 milliGRAM(s) Oral daily  lisinopril 20 milliGRAM(s) Oral daily  metoprolol succinate  milliGRAM(s) Oral daily  pantoprazole    Tablet 40 milliGRAM(s) Oral before breakfast  vancomycin  IVPB 500 milliGRAM(s) IV Intermittent every 24 hours    MEDICATIONS  (PRN):  acetaminophen   Tablet .. 650 milliGRAM(s) Oral every 6 hours PRN Temp greater or equal to 38.5C (101.3F), Mild Pain (1 - 3)            Vital Signs Last 24 Hrs  T(C): 36.4 (08 Sep 2020 04:00), Max: 38 (07 Sep 2020 14:27)  T(F): 97.5 (08 Sep 2020 04:00), Max: 100.4 (07 Sep 2020 14:27)  HR: 59 (08 Sep 2020 05:55) (51 - 77)  BP: 114/76 (08 Sep 2020 05:55) (82/62 - 114/76)  BP(mean): 77 (07 Sep 2020 17:27) (67 - 77)  RR: 18 (08 Sep 2020 05:46) (11 - 18)  SpO2: 96% (08 Sep 2020 05:46) (93% - 100%)      VBG pH 7.29  @ 18:54  VBG pCO2 62  @ 18:54  VBG O2 sat 51  @ 18:54  VBG lactate 1.7  @ 18:54  VBG pH 7.22  @ 13:07  VBG pCO2 80  @ 13:07  VBG O2 sat 10  @ 13:07  VBG lactate 3.1  @ 13:07        09 @ 07:01  -   @ 07:00  --------------------------------------------------------  IN: 0 mL / OUT: 250 mL / NET: -250 mL          LABS:                        14.4   10.47 )-----------( 263      ( 07 Sep 2020 13:07 )             44.3     07    140  |  101  |  12  ----------------------------<  148<H>  4.6   |  29  |  1.61<H>    Ca    8.9      07 Sep 2020 13:07    TPro  6.3  /  Alb  3.4  /  TBili  0.5  /  DBili  x   /  AST  41<H>  /  ALT  32  /  AlkPhos  75  07          CAPILLARY BLOOD GLUCOSE      POCT Blood Glucose.: 138 mg/dL (07 Sep 2020 13:01)    PT/INR - ( 07 Sep 2020 13:07 )   PT: 24.6 sec;   INR: 2.14 ratio         PTT - ( 07 Sep 2020 13:07 )  PTT:29.8 sec  Urinalysis Basic - ( 07 Sep 2020 18:38 )    Color: Yellow / Appearance: Clear / S.040 / pH: x  Gluc: x / Ketone: Negative  / Bili: Negative / Urobili: Negative   Blood: x / Protein: 30 mg/dL / Nitrite: Negative   Leuk Esterase: Negative / RBC: 5 /hpf / WBC 9 /HPF   Sq Epi: x / Non Sq Epi: 3 /hpf / Bacteria: Negative      Procalcitonin, Serum: 0.22 ng/mL (20 @ 13:07)              Physical Examination:    General: No acute distress.      HEENT: Pupils equal, reactive to light.  Symmetric.    PULM: diminished BS throughout     CVS: RRR    ABD: Soft, nondistended, nontender, normoactive bowel sounds, no masses    EXT: No edema, nontender    SKIN: Warm and well perfused, no rashes noted.    NEURO: lethargic, A&Ox1, follows some commands.      RADIOLOGY REVIEWED  CT chest: < from: CT Chest No Cont (20 @ 13:07) >  FINDINGS:  Mildly motion degraded.  LUNGS AND AIRWAYS: Right middle lobe atelectasis is unchanged.  Emphysema. Spiculated left upper lobe mass with pleural extension is decreased in size, nowapproximately 2.9 x 1.9 cm (series 2, image 34), previously 4.4 x 4.2 cm. Stable subcentimeter bilateral pulmonary nodules, with representative 6 mm left lower lobe pulmonary nodule (2, 41). Scattered calcified granulomas.    There is extensive bronchovascular wall thickening and patchy airspace opacities throughout the left lung, suspicious for pneumonia.  PLEURA: No pleural effusion.  MEDIASTINUM AND AMAURI: No gross lymphadenopathy; previously described mediastinal and hilar adenopathy is difficult to visualize without contrast.  VESSELS: Aortic and coronary artery calcifications.  HEART: Heart size is normal. No pericardial effusion.  CHEST WALL AND LOWER NECK: Within normal limits.  VISUALIZED UPPER ABDOMEN: Thickened, nodular right adrenal, appears increased since prior although not well evaluated on noncontrast CT.  BONES:  Degenerative changes.    IMPRESSION:  Interval decrease in size of left upper lobe mass with pleural extension.    Extensive patchy airspace opacities in the leftlung, with bronchovascular wall thickening, suspicious for pneumonia.    Increased nodular right adrenal thickening, not well evaluated on noncontrast CT.    < end of copied text >      CT head: < from: CT Brain Stroke Protocol (20 @ 13:18) >  IMPRESSION:    A 4.3 x 3.1 cm hypodense lesion within the left parietotemporal lobes with extensive surrounding low-attenuation change within the white matter and mass effect with associated 4 mm left rightward shift of the midline structures, new since 2019, concerning for metastatic disease or less likely primary brain tumor.    No acute intracranial hemorrhage.    < end of copied text >

## 2020-09-08 NOTE — SWALLOW BEDSIDE ASSESSMENT ADULT - SLP PERTINENT HISTORY OF CURRENT PROBLEM
70yo M with PMHx of metastatic lung cancer currently on chemotherapy coming in with likely sepsis. As per EMS, pt has been having fevers at home since yesterday. Today, the patient was altered and not answering questions, with possibly some right sided weakness and so was brought in and had a code stroke called.

## 2020-09-08 NOTE — PROVIDER CONTACT NOTE (OTHER) - ASSESSMENT
A&Ox1. VSS. No s/s of distress. No c/o discomfort. Pt HR briefly went up to the 103s, current HR 70-90s. Pt has h/o Afib, on digoxin.

## 2020-09-08 NOTE — DISCHARGE NOTE NURSING/CASE MANAGEMENT/SOCIAL WORK - PATIENT PORTAL LINK FT
You can access the FollowMyHealth Patient Portal offered by [facility] by registering at the following website: [email or web address]. By joining [employer]’s FollowMyHealth portal, you will also be able to view your health information using other applications (apps) compatible with our system.

## 2020-09-08 NOTE — SWALLOW BEDSIDE ASSESSMENT ADULT - PHARYNGEAL PHASE
Decreased laryngeal elevation/Delayed pharyngeal swallow Delayed pharyngeal swallow/Decreased laryngeal elevation/Throat clear post oral intake/Wet vocal quality post oral intake

## 2020-09-08 NOTE — PHYSICAL THERAPY INITIAL EVALUATION ADULT - PERTINENT HX OF CURRENT PROBLEM, REHAB EVAL
Pt is a 70yo M with PMHx of metastatic lung cancer currently on chemotherapy coming in with likely sepsis. As per EMS, pt has been having fevers at home since yesterday. Today, the patient was altered and not answering questions, with possibly some right sided weakness and so was brought in and had a code stroke called. Pt found to have new brain mass suspicious for metastatic disease.

## 2020-09-09 LAB
-  CANDIDA ALBICANS: SIGNIFICANT CHANGE UP
GRAM STN FLD: SIGNIFICANT CHANGE UP
METHOD TYPE: SIGNIFICANT CHANGE UP

## 2020-09-11 LAB
-  FLUCONAZOLE: SIGNIFICANT CHANGE UP
-  INTERPRETATION: SIGNIFICANT CHANGE UP
CULTURE RESULTS: SIGNIFICANT CHANGE UP
METHOD TYPE: SIGNIFICANT CHANGE UP
ORGANISM # SPEC MICROSCOPIC CNT: SIGNIFICANT CHANGE UP
SPECIMEN SOURCE: SIGNIFICANT CHANGE UP

## 2020-09-12 LAB
CULTURE RESULTS: SIGNIFICANT CHANGE UP
SPECIMEN SOURCE: SIGNIFICANT CHANGE UP

## 2020-09-25 ENCOUNTER — RX RENEWAL (OUTPATIENT)
Age: 70
End: 2020-09-25

## 2020-10-05 ENCOUNTER — NON-APPOINTMENT (OUTPATIENT)
Age: 70
End: 2020-10-05

## 2020-10-05 ENCOUNTER — LABORATORY RESULT (OUTPATIENT)
Age: 70
End: 2020-10-05

## 2020-10-05 ENCOUNTER — APPOINTMENT (OUTPATIENT)
Dept: CARDIOLOGY | Facility: CLINIC | Age: 70
End: 2020-10-05
Payer: MEDICARE

## 2020-10-05 VITALS
OXYGEN SATURATION: 96 % | DIASTOLIC BLOOD PRESSURE: 80 MMHG | TEMPERATURE: 97.6 F | WEIGHT: 130 LBS | BODY MASS INDEX: 19.7 KG/M2 | HEIGHT: 68 IN | HEART RATE: 57 BPM | SYSTOLIC BLOOD PRESSURE: 138 MMHG

## 2020-10-05 DIAGNOSIS — Z98.890 OTHER SPECIFIED POSTPROCEDURAL STATES: ICD-10-CM

## 2020-10-05 PROCEDURE — 93000 ELECTROCARDIOGRAM COMPLETE: CPT

## 2020-10-05 PROCEDURE — 99214 OFFICE O/P EST MOD 30 MIN: CPT

## 2020-10-05 RX ORDER — FUROSEMIDE 40 MG/1
40 TABLET ORAL
Qty: 300 | Refills: 0 | Status: DISCONTINUED | COMMUNITY
Start: 2019-11-13 | End: 2020-10-05

## 2020-10-05 NOTE — HISTORY OF PRESENT ILLNESS
[FreeTextEntry1] : Pt presents for follow up s/p hospitalization at [facility] where he had neurosurgery for a brain tumor w/ Dr. Gill. Pt subsequently went into A. Fib in rehab and was started on Digoxin, Amiodarone and Eliquis. He states he is feeling well now, denies chest pain, shortness of breath, palpitations, fever, cough, abdominal pain, nausea/vomiting.  Pt states he does have b/l lower extremity edema, is not currently taking any diuretics. Pt is scheduled for a repeat RODOLFO on 10/21 w/ Dr. Leary. Pt was discharged w/ PICC line currently receiving Micafungin infusions. \par The patient presents for evaluation of high blood pressure. Patient is currently tolerating the current  antihypertensive regime and they deny headaches, stiff neck, visual changes, pedal Edema or PND. They also are here for follow-up of elevated cholesterol. Patient is currently tolerating medication and and does not complain of new  muscle pain, joint pain, back pain,urinary changes ,nausea, vomiting, abdominal pain or diarrhea. The patient is trying to follow a low cholesterol diet. \par The patient is also here for follow-up of atrial fibrillation and currently they feel well with an occasional episode of palpitations.  The patient states they are reliably taking the anticoagulation medicine and are not having any signs of bleeding they deny any dizziness headaches nosebleeds or black tarry stools

## 2020-10-05 NOTE — PHYSICAL EXAM
[General Appearance - Well Developed] : well developed [Normal Appearance] : normal appearance [Well Groomed] : well groomed [General Appearance - Well Nourished] : well nourished [No Deformities] : no deformities [General Appearance - In No Acute Distress] : no acute distress [Normal Conjunctiva] : the conjunctiva exhibited no abnormalities [Eyelids - No Xanthelasma] : the eyelids demonstrated no xanthelasmas [Normal Oral Mucosa] : normal oral mucosa [No Oral Pallor] : no oral pallor [No Oral Cyanosis] : no oral cyanosis [Normal Jugular Venous A Waves Present] : normal jugular venous A waves present [Normal Jugular Venous V Waves Present] : normal jugular venous V waves present [No Jugular Venous Martinez A Waves] : no jugular venous martinez A waves [Respiration, Rhythm And Depth] : normal respiratory rhythm and effort [Exaggerated Use Of Accessory Muscles For Inspiration] : no accessory muscle use [Auscultation Breath Sounds / Voice Sounds] : lungs were clear to auscultation bilaterally [Abdomen Soft] : soft [Abdomen Tenderness] : non-tender [Abdomen Mass (___ Cm)] : no abdominal mass palpated [Abnormal Walk] : normal gait [Gait - Sufficient For Exercise Testing] : the gait was sufficient for exercise testing [Nail Clubbing] : no clubbing of the fingernails [Cyanosis, Localized] : no localized cyanosis [Petechial Hemorrhages (___cm)] : no petechial hemorrhages [Skin Color & Pigmentation] : normal skin color and pigmentation [] : no rash [No Venous Stasis] : no venous stasis [Skin Lesions] : no skin lesions [No Skin Ulcers] : no skin ulcer [No Xanthoma] : no  xanthoma was observed [Oriented To Time, Place, And Person] : oriented to person, place, and time [Affect] : the affect was normal [Mood] : the mood was normal [No Anxiety] : not feeling anxious [FreeTextEntry1] : +3 edema to b/l lower extremities

## 2020-10-08 LAB
ALBUMIN SERPL ELPH-MCNC: 3.3 G/DL
ALP BLD-CCNC: 77 U/L
ALT SERPL-CCNC: 30 U/L
ANION GAP SERPL CALC-SCNC: 9 MMOL/L
AST SERPL-CCNC: 19 U/L
BASOPHILS # BLD AUTO: 0 K/UL
BASOPHILS NFR BLD AUTO: 0 %
BILIRUB DIRECT SERPL-MCNC: 0.1 MG/DL
BILIRUB INDIRECT SERPL-MCNC: 0.2 MG/DL
BILIRUB SERPL-MCNC: 0.3 MG/DL
BUN SERPL-MCNC: 19 MG/DL
CALCIUM SERPL-MCNC: 9.1 MG/DL
CHLORIDE SERPL-SCNC: 99 MMOL/L
CHOLEST SERPL-MCNC: 266 MG/DL
CHOLEST/HDLC SERPL: 3.3 RATIO
CK SERPL-CCNC: 40 U/L
CO2 SERPL-SCNC: 31 MMOL/L
CREAT SERPL-MCNC: 1.27 MG/DL
DIGOXIN SERPL-MCNC: 1.1 NG/ML
EOSINOPHIL # BLD AUTO: 0.07 K/UL
EOSINOPHIL NFR BLD AUTO: 0.9 %
ESTIMATED AVERAGE GLUCOSE: 131 MG/DL
GLUCOSE SERPL-MCNC: 124 MG/DL
HBA1C MFR BLD HPLC: 6.2 %
HCT VFR BLD CALC: 36.6 %
HDLC SERPL-MCNC: 81 MG/DL
HGB BLD-MCNC: 11.1 G/DL
LDLC SERPL CALC-MCNC: 167 MG/DL
LDLC SERPL DIRECT ASSAY-MCNC: 164 MG/DL
LYMPHOCYTES # BLD AUTO: 1.64 K/UL
LYMPHOCYTES NFR BLD AUTO: 20.2 %
MAN DIFF?: NORMAL
MCHC RBC-ENTMCNC: 30.3 GM/DL
MCHC RBC-ENTMCNC: 32.6 PG
MCV RBC AUTO: 107.3 FL
MONOCYTES # BLD AUTO: 0.36 K/UL
MONOCYTES NFR BLD AUTO: 4.4 %
NEUTROPHILS # BLD AUTO: 5.96 K/UL
NEUTROPHILS NFR BLD AUTO: 71.9 %
PLATELET # BLD AUTO: 275 K/UL
POTASSIUM SERPL-SCNC: 4.6 MMOL/L
PROT SERPL-MCNC: 5.9 G/DL
RBC # BLD: 3.41 M/UL
RBC # FLD: 16.4 %
SODIUM SERPL-SCNC: 139 MMOL/L
TRIGL SERPL-MCNC: 90 MG/DL
TSH SERPL-ACNC: 1.95 UIU/ML
WBC # FLD AUTO: 8.1 K/UL

## 2020-10-13 ENCOUNTER — APPOINTMENT (OUTPATIENT)
Dept: CARDIOLOGY | Facility: CLINIC | Age: 70
End: 2020-10-13
Payer: MEDICARE

## 2020-10-13 VITALS
WEIGHT: 130 LBS | HEART RATE: 70 BPM | SYSTOLIC BLOOD PRESSURE: 120 MMHG | BODY MASS INDEX: 19.7 KG/M2 | HEIGHT: 68 IN | OXYGEN SATURATION: 98 % | DIASTOLIC BLOOD PRESSURE: 70 MMHG | TEMPERATURE: 98.7 F

## 2020-10-13 PROCEDURE — 99214 OFFICE O/P EST MOD 30 MIN: CPT

## 2020-10-13 RX ORDER — BUSPIRONE HYDROCHLORIDE 7.5 MG/1
7.5 TABLET ORAL
Qty: 60 | Refills: 0 | Status: DISCONTINUED | COMMUNITY
Start: 2020-08-18 | End: 2020-10-13

## 2020-10-13 RX ORDER — AMMONIUM LACTATE 12 %
12 CREAM (GRAM) TOPICAL TWICE DAILY
Qty: 1 | Refills: 3 | Status: DISCONTINUED | COMMUNITY
Start: 2019-05-22 | End: 2020-10-13

## 2020-10-13 RX ORDER — AMLODIPINE BESYLATE 5 MG/1
5 TABLET ORAL DAILY
Qty: 30 | Refills: 0 | Status: DISCONTINUED | COMMUNITY
Start: 2019-12-26 | End: 2020-10-13

## 2020-10-13 RX ORDER — QUINAPRIL HYDROCHLORIDE 20 MG/1
20 TABLET, FILM COATED ORAL
Qty: 90 | Refills: 1 | Status: DISCONTINUED | COMMUNITY
Start: 2020-01-06 | End: 2020-10-13

## 2020-10-13 RX ORDER — AMIODARONE HYDROCHLORIDE 200 MG/1
200 TABLET ORAL
Refills: 0 | Status: DISCONTINUED | COMMUNITY
Start: 2020-10-05 | End: 2020-10-13

## 2020-10-13 RX ORDER — ALPRAZOLAM 0.5 MG/1
0.5 TABLET ORAL
Qty: 60 | Refills: 0 | Status: DISCONTINUED | COMMUNITY
End: 2020-10-13

## 2020-10-13 RX ORDER — LEVETIRACETAM 500 MG/1
500 TABLET, FILM COATED ORAL TWICE DAILY
Qty: 60 | Refills: 0 | Status: ACTIVE | COMMUNITY
Start: 2020-10-13

## 2020-10-13 NOTE — PHYSICAL EXAM
[General Appearance - Well Developed] : well developed [Normal Appearance] : normal appearance [Well Groomed] : well groomed [General Appearance - Well Nourished] : well nourished [No Deformities] : no deformities [General Appearance - In No Acute Distress] : no acute distress [Normal Conjunctiva] : the conjunctiva exhibited no abnormalities [Eyelids - No Xanthelasma] : the eyelids demonstrated no xanthelasmas [Normal Oral Mucosa] : normal oral mucosa [No Oral Pallor] : no oral pallor [No Oral Cyanosis] : no oral cyanosis [Normal Jugular Venous A Waves Present] : normal jugular venous A waves present [Normal Jugular Venous V Waves Present] : normal jugular venous V waves present [No Jugular Venous Martinez A Waves] : no jugular venous martinez A waves [Respiration, Rhythm And Depth] : normal respiratory rhythm and effort [Exaggerated Use Of Accessory Muscles For Inspiration] : no accessory muscle use [Auscultation Breath Sounds / Voice Sounds] : lungs were clear to auscultation bilaterally [Heart Rate And Rhythm] : heart rate and rhythm were normal [Heart Sounds] : normal S1 and S2 [Murmurs] : no murmurs present [Abdomen Soft] : soft [Abdomen Tenderness] : non-tender [Abdomen Mass (___ Cm)] : no abdominal mass palpated [Abnormal Walk] : normal gait [Gait - Sufficient For Exercise Testing] : the gait was sufficient for exercise testing [Nail Clubbing] : no clubbing of the fingernails [Cyanosis, Localized] : no localized cyanosis [Petechial Hemorrhages (___cm)] : no petechial hemorrhages [Skin Color & Pigmentation] : normal skin color and pigmentation [] : no rash [No Venous Stasis] : no venous stasis [Skin Lesions] : no skin lesions [No Skin Ulcers] : no skin ulcer [No Xanthoma] : no  xanthoma was observed [Oriented To Time, Place, And Person] : oriented to person, place, and time [Affect] : the affect was normal [Mood] : the mood was normal [No Anxiety] : not feeling anxious

## 2020-10-19 ENCOUNTER — APPOINTMENT (OUTPATIENT)
Dept: CARDIOLOGY | Facility: CLINIC | Age: 70
End: 2020-10-19
Payer: MEDICARE

## 2020-10-19 ENCOUNTER — NON-APPOINTMENT (OUTPATIENT)
Age: 70
End: 2020-10-19

## 2020-10-19 VITALS — BODY MASS INDEX: 19.7 KG/M2 | WEIGHT: 130 LBS | TEMPERATURE: 98.6 F | HEIGHT: 68 IN

## 2020-10-19 VITALS — SYSTOLIC BLOOD PRESSURE: 115 MMHG | OXYGEN SATURATION: 99 % | DIASTOLIC BLOOD PRESSURE: 70 MMHG

## 2020-10-19 DIAGNOSIS — M79.89 OTHER SPECIFIED SOFT TISSUE DISORDERS: ICD-10-CM

## 2020-10-19 PROCEDURE — 99214 OFFICE O/P EST MOD 30 MIN: CPT

## 2020-10-19 RX ORDER — METOPROLOL SUCCINATE 100 MG/1
100 TABLET, EXTENDED RELEASE ORAL
Qty: 90 | Refills: 0 | Status: DISCONTINUED | COMMUNITY
Start: 2020-08-13 | End: 2020-10-19

## 2020-10-21 PROBLEM — M79.89 LEG SWELLING: Status: ACTIVE | Noted: 2019-10-15

## 2020-10-28 ENCOUNTER — APPOINTMENT (OUTPATIENT)
Dept: ELECTROPHYSIOLOGY | Facility: CLINIC | Age: 70
End: 2020-10-28

## 2020-10-29 NOTE — ASSESSMENT
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 PDL1 unknown. \par Given excellent PS and unknown PDL1 and absence of  mutations, started carbo/pemetrexed and Keytruda triplet combo as per Keynote 189 study. Pt is s/p cycle 4 and is currently on maintenance with Keytruda and Alimta. \par When pt goes on vacation, will hold off on Alimta but continue Keytruda to avoid immunosuppresion. \par CEA stable. \par OV in 6 weeks.  [Palliative] : Goals of care discussed with patient: Palliative back/chest

## 2020-12-01 NOTE — PHYSICAL EXAM
[General Appearance - Well Developed] : well developed [Normal Appearance] : normal appearance [No Deformities] : no deformities [Well Groomed] : well groomed [General Appearance - Well Nourished] : well nourished [General Appearance - In No Acute Distress] : no acute distress [Eyelids - No Xanthelasma] : the eyelids demonstrated no xanthelasmas [Normal Conjunctiva] : the conjunctiva exhibited no abnormalities [Normal Oral Mucosa] : normal oral mucosa [No Oral Pallor] : no oral pallor [No Oral Cyanosis] : no oral cyanosis [Normal Jugular Venous A Waves Present] : normal jugular venous A waves present [Normal Jugular Venous V Waves Present] : normal jugular venous V waves present [No Jugular Venous Martinez A Waves] : no jugular venous martinez A waves [Respiration, Rhythm And Depth] : normal respiratory rhythm and effort [Heart Rate And Rhythm] : heart rate and rhythm were normal [Exaggerated Use Of Accessory Muscles For Inspiration] : no accessory muscle use [Auscultation Breath Sounds / Voice Sounds] : lungs were clear to auscultation bilaterally [Heart Sounds] : normal S1 and S2 [Murmurs] : no murmurs present [Abdomen Soft] : soft [Abdomen Tenderness] : non-tender [Abnormal Walk] : normal gait [Abdomen Mass (___ Cm)] : no abdominal mass palpated [Cyanosis, Localized] : no localized cyanosis [Nail Clubbing] : no clubbing of the fingernails [Gait - Sufficient For Exercise Testing] : the gait was sufficient for exercise testing [Petechial Hemorrhages (___cm)] : no petechial hemorrhages [Skin Color & Pigmentation] : normal skin color and pigmentation [No Venous Stasis] : no venous stasis [] : no rash [Skin Lesions] : no skin lesions [No Xanthoma] : no  xanthoma was observed [No Skin Ulcers] : no skin ulcer [Oriented To Time, Place, And Person] : oriented to person, place, and time [Affect] : the affect was normal [Mood] : the mood was normal [No Anxiety] : not feeling anxious [FreeTextEntry1] : +2 edema to b/l lower extremities

## 2020-12-01 NOTE — HISTORY OF PRESENT ILLNESS
[FreeTextEntry1] : \par The patient is also here for follow-up of atrial fibrillation and currently they feel well with an occasional episode of palpitations.  The patient states they are reliably taking the anticoagulation medicine and are not having any signs of bleeding they deny any dizziness headaches nosebleeds or black tarry stools \par Pt states that leg swelling has improved since last visit, he states that in the morning there is no leg swelling and by the end of the day the swelling increases slightly. Denies any chest pain, shortness of breath, dizziness. \par Pt had echo done last week revealing LV ejection fraction of 55%.  Patient is seeing HARDY Sarmiento to see if a transesophageal echo is needed that is post fungal infection he has finished his anti fungal IV antibiotics.  He also states that he is feeling better since his last visit.

## 2021-02-01 ENCOUNTER — TRANSCRIPTION ENCOUNTER (OUTPATIENT)
Age: 71
End: 2021-02-01

## 2021-02-01 RX ORDER — DIGOXIN 125 UG/1
125 TABLET ORAL DAILY
Qty: 90 | Refills: 1 | Status: DISCONTINUED | COMMUNITY
Start: 2019-10-31 | End: 2021-02-01

## 2021-02-01 RX ORDER — APIXABAN 5 MG/1
5 TABLET, FILM COATED ORAL
Qty: 180 | Refills: 0 | Status: DISCONTINUED | COMMUNITY
Start: 2019-10-31 | End: 2021-02-01

## 2021-02-03 ENCOUNTER — TRANSCRIPTION ENCOUNTER (OUTPATIENT)
Age: 71
End: 2021-02-03

## 2021-02-03 ENCOUNTER — NON-APPOINTMENT (OUTPATIENT)
Age: 71
End: 2021-02-03

## 2021-02-03 ENCOUNTER — APPOINTMENT (OUTPATIENT)
Dept: CARDIOLOGY | Facility: CLINIC | Age: 71
End: 2021-02-03
Payer: MEDICARE

## 2021-02-03 VITALS
HEART RATE: 85 BPM | BODY MASS INDEX: 19.7 KG/M2 | SYSTOLIC BLOOD PRESSURE: 132 MMHG | WEIGHT: 130 LBS | DIASTOLIC BLOOD PRESSURE: 80 MMHG | TEMPERATURE: 97.6 F | HEIGHT: 68 IN | OXYGEN SATURATION: 97 %

## 2021-02-03 DIAGNOSIS — J18.9 PNEUMONIA, UNSPECIFIED ORGANISM: ICD-10-CM

## 2021-02-03 PROCEDURE — 93000 ELECTROCARDIOGRAM COMPLETE: CPT

## 2021-02-03 PROCEDURE — 99214 OFFICE O/P EST MOD 30 MIN: CPT

## 2021-02-03 RX ORDER — DEXAMETHASONE 1 MG/1
1 TABLET ORAL
Refills: 0 | Status: DISCONTINUED | COMMUNITY
Start: 2020-10-05 | End: 2021-02-03

## 2021-02-03 RX ORDER — POTASSIUM CHLORIDE 750 MG/1
10 CAPSULE, EXTENDED RELEASE ORAL
Refills: 0 | Status: ACTIVE | COMMUNITY

## 2021-02-03 NOTE — HISTORY OF PRESENT ILLNESS
[FreeTextEntry1] : Pt presents for follow up s/p hospitalization at [facility] for dehydration and pneumonia. Pt states that Dig levels and Potassium levels were elevated in the hospital. Pt was discharged on Cardizem 240 mg instead of Digoxin. Cardizem dose was later lowered to 120 mg by Dr. Leary. Eliquis was also discontinued in the hospital. Pt was discharged on 2L nasal cannula. Pt is followed by pulm Dr. Aly. \par Pt states that this morning he experienced shortness of breath while walking from the bedroom to the bathroom. He denies any chest pain, palpitations, nausea/vomiting, cough, fever, abdominal pain.

## 2021-03-02 ENCOUNTER — NON-APPOINTMENT (OUTPATIENT)
Age: 71
End: 2021-03-02

## 2021-03-02 PROCEDURE — 93224 XTRNL ECG REC UP TO 48 HRS: CPT

## 2021-03-10 ENCOUNTER — APPOINTMENT (OUTPATIENT)
Dept: CARDIOLOGY | Facility: CLINIC | Age: 71
End: 2021-03-10
Payer: MEDICARE

## 2021-03-10 VITALS
BODY MASS INDEX: 19.7 KG/M2 | OXYGEN SATURATION: 94 % | TEMPERATURE: 97.6 F | SYSTOLIC BLOOD PRESSURE: 120 MMHG | DIASTOLIC BLOOD PRESSURE: 60 MMHG | HEIGHT: 68 IN | HEART RATE: 62 BPM | WEIGHT: 130 LBS

## 2021-03-10 PROCEDURE — 99214 OFFICE O/P EST MOD 30 MIN: CPT

## 2021-03-10 NOTE — HISTORY OF PRESENT ILLNESS
[FreeTextEntry1] : The patient is also here for follow-up of atrial fibrillation and currently they feel well with an occasional episode of palpitations.  The patient states they are reliably taking the anticoagulation medicine and are not having any signs of bleeding they deny any dizziness headaches nosebleeds or black tarry stools.\par The patient presents for evaluation of high blood pressure. Patient is currently tolerating the current  antihypertensive regime and they deny headaches, stiff neck, visual changes, pedal Edema or PND. They also are here for follow-up of elevated cholesterol. Patient is currently tolerating medication and denies muscle pain, joint pain, back pain, tea colored urine ,nausea, vomiting, abdominal pain or diarrhea. The patient is trying to follow a low cholesterol diet.\par Pt had echo 02/22 and has another one scheduled for Friday while on trial medication for Lung Ca. \par His oncologist increased his furosemide to 40mg BID.

## 2021-05-20 ENCOUNTER — NON-APPOINTMENT (OUTPATIENT)
Age: 71
End: 2021-05-20

## 2021-06-08 ENCOUNTER — NON-APPOINTMENT (OUTPATIENT)
Age: 71
End: 2021-06-08

## 2021-06-08 ENCOUNTER — APPOINTMENT (OUTPATIENT)
Dept: CARDIOLOGY | Facility: CLINIC | Age: 71
End: 2021-06-08
Payer: MEDICARE

## 2021-06-08 VITALS
WEIGHT: 130 LBS | DIASTOLIC BLOOD PRESSURE: 60 MMHG | BODY MASS INDEX: 19.7 KG/M2 | HEART RATE: 55 BPM | HEIGHT: 68 IN | OXYGEN SATURATION: 97 % | SYSTOLIC BLOOD PRESSURE: 122 MMHG

## 2021-06-08 DIAGNOSIS — Z23 ENCOUNTER FOR IMMUNIZATION: ICD-10-CM

## 2021-06-08 DIAGNOSIS — J44.9 CHRONIC OBSTRUCTIVE PULMONARY DISEASE, UNSPECIFIED: ICD-10-CM

## 2021-06-08 DIAGNOSIS — C34.90 MALIGNANT NEOPLASM OF UNSPECIFIED PART OF UNSPECIFIED BRONCHUS OR LUNG: ICD-10-CM

## 2021-06-08 DIAGNOSIS — I10 ESSENTIAL (PRIMARY) HYPERTENSION: ICD-10-CM

## 2021-06-08 DIAGNOSIS — E78.5 HYPERLIPIDEMIA, UNSPECIFIED: ICD-10-CM

## 2021-06-08 DIAGNOSIS — I48.91 UNSPECIFIED ATRIAL FIBRILLATION: ICD-10-CM

## 2021-06-08 PROCEDURE — 99214 OFFICE O/P EST MOD 30 MIN: CPT

## 2021-06-08 PROCEDURE — 93000 ELECTROCARDIOGRAM COMPLETE: CPT

## 2021-06-08 RX ORDER — FUROSEMIDE 40 MG/1
40 TABLET ORAL TWICE DAILY
Qty: 180 | Refills: 1 | Status: DISCONTINUED | COMMUNITY
Start: 2020-10-05 | End: 2021-06-08

## 2021-06-08 NOTE — HISTORY OF PRESENT ILLNESS
[FreeTextEntry1] : Pt presents stating that on 5/20 he was having a rapid heart rate sustaining in the 140s. Pt states the rest of his vital signs were stable. Pt went to the hospital at [facility] in [facility] where he received IV fluids. Medications were not adjusted in the hospital. Last week his heart rate went into the 140s again. Pt saw his oncologist where he received more IV fluids and was advised to f/u w/ his cardiologist. \par The patient presents for evaluation of high blood pressure. Patient is currently tolerating the current  antihypertensive regime and they deny headaches, stiff neck, visual changes, pedal Edema or PND. They also are here for follow-up of elevated cholesterol. Patient is currently tolerating medication and and does not complain of new  muscle pain, joint pain, back pain,urinary changes ,nausea, vomiting, abdominal pain or diarrhea. The patient is trying to follow a low cholesterol diet. \par The patient is also here for follow-up of atrial fibrillation and currently they feel well with an occasional episode of palpitations.  The patient states they are reliably taking the anticoagulation medicine and are not having any signs of bleeding they deny any dizziness headaches nosebleeds or black tarry stools \par The patient  has COPD they report occasional cough with clear sputum expectoration  and  they are  taking inhalation medications when advised. They report mild dyspnea which is unchanged from their baseline and have follow-up with their pulmonary specialist. \par Pt is followed by pulm Dr. Amaro\par Pt states today that they had both covid 19 vaccine . pt had the COVID-19 vaccine without major side effects. The patient did experience mild fatigue headache and arm soreness. The patient denied any fever over 100.5. pt denies any recent sore throat, fever, chills loss of taste or smell.

## 2021-06-09 NOTE — STROKE CODE NOTE - NIH STROKE SCALE: 2. BEST GAZE, QM
(1) Partial gaze palsy; gaze is abnormal in one or both eyes, but forced deviation or total gaze paresis is not present
Jaspreet

## 2021-07-13 RX ORDER — APIXABAN 5 MG/1
5 TABLET, FILM COATED ORAL
Qty: 180 | Refills: 1 | Status: ACTIVE | COMMUNITY
Start: 2021-02-03 | End: 1900-01-01

## 2021-07-13 RX ORDER — METOPROLOL SUCCINATE 50 MG/1
50 TABLET, EXTENDED RELEASE ORAL
Qty: 90 | Refills: 1 | Status: ACTIVE | COMMUNITY
Start: 2020-10-19 | End: 1900-01-01

## 2021-07-13 RX ORDER — DILTIAZEM HYDROCHLORIDE 180 MG/1
180 CAPSULE, EXTENDED RELEASE ORAL
Qty: 90 | Refills: 1 | Status: ACTIVE | COMMUNITY
Start: 1900-01-01 | End: 1900-01-01

## 2021-08-16 ENCOUNTER — APPOINTMENT (OUTPATIENT)
Dept: CARDIOLOGY | Facility: CLINIC | Age: 71
End: 2021-08-16

## 2022-08-23 NOTE — ED PROVIDER NOTE - RESPIRATORY NEGATIVE STATEMENT, MLM
Patient/Caregiver provided printed discharge information.
no chest pain, no cough, and no shortness of breath.

## 2023-07-17 NOTE — ED ADULT NURSE REASSESSMENT NOTE - BREATH SOUNDS, RIGHT
EXAMINATION NOTE    Chief Complaint   Patient presents with   • Exposure To STD     Wants full STD testing. Patient states he has not been exposed but just wants a full panel done since he has never had one.         HISTORY:    Rosalio Constantino is a 19 year old male who presents to the office today for an annual follow up. The patient is requesting a full STD panel. He denies any known STD exposure and denies having any symptoms. He has no urinary burning, pain, frequency, urgency, or penile discharge. He states he does use safe sex practices some of the time, but he does admit to having unprotected sex with multipl partners. The patient reports he is otherwise doing well. His mood is good, he is sleeping good, and he is physically doing well.       I have reviewed the past medical, family and social history sections including the medications and allergies listed in the above medical record as well as the nursing notes.     There are no problems to display for this patient.      No current outpatient medications on file.     No current facility-administered medications for this visit.         REVIEW OF SYSTEMS    Constitutional:  Patient denies fever, chills, tiredness or malaise.    Eyes:  Denies change in visual acuity, pain, burning or itching.    HENT:  Denies sinus problems, ear infections, nasal congestion or sore throat.    Respiratory:  Denies cough, shortness of breath.    Cardiovascular:  Denies chest pain, edema.    Gastrointestinal:  Denies abdominal pain, nausea, vomiting, bloody stools or diarrhea.    Musculoskeletal:  Denies back pain, neck pain, joint pain or leg swelling.    Integument:  Denies rash, itching.    Neurologic:  Denies headache, focal weakness or sensory changes.      All other systems reviewed and negative        Vital Signs:    Vitals:    07/17/23 1341   BP: 120/72   BP Location: LUE - Left upper extremity   Patient Position: Sitting   Cuff Size: Regular   Pulse: 71   SpO2: 100%    Weight: 74.8 kg (164 lb 14.4 oz)   Height: 5' 8\" (1.727 m)         PHYSICAL EXAMINATION:    Constitutional:  In no acute distress.  Appears stated age.  Cooperative throughout exam.   Integument:  Warm.  Dry.  No erythema.  No rash.    Eyes:  PERRL (Pupils equal, round, reactive to light), EOMI (extraocular movements intact).  Conjunctivae normal.  No discharge.   HENT:  Normocephalic.  Atraumatic.  Bilateral external ears normal.  Oropharynx moist. No oral exudates.  Nose normal.   Neck:  Normal range of motion.  No masses.  No tenderness.  Supple.  No stridor.  No JVD (jugular venous distention).  No bruits.  No thyromegaly.  No cervical adenopathy. No supraclavicular adenopathy.   Respiratory:  Normal breath sounds.  No respiratory distress.  No wheezing.  No crackles.  No rhonchi.  No chest tenderness.    Cardiovascular:  Normal heart rate.  Normal rhythm.  No murmurs.  No rubs.  No gallops.    Gastrointestinal:  Bowel sounds normal.  Soft.  No tenderness.  No masses.  No pulsatile masses.  No hepatomegaly.  No splenomegaly.  Musculoskeletal:  Normal strength.  Normal reflexes.          ASSESSMENT AND PLAN:    1. Screen for STD (sexually transmitted disease)  - The patient is asymptomatic at this time.  - The patient was sent for labs. Advised that we will call with results and if any additional recommendations are warranted.   - Chlamydia/Gonorrhea by Nucleic Acid Amplification; Future  - Urinalysis & Reflex Microscopy With Culture If Indicated; Future              On 7/17/2023, Sapna GUY MA scribed the services personally performed by Jesus Oconnell MD       clear

## 2023-11-22 NOTE — INPATIENT CERTIFICATION FOR MEDICARE PATIENTS - NS ICMP CERT SIG IND
Occupational Therapy    Patient not seen in therapy.     Patient with other health care provider    Pt currently meeting with PC service.  Will continue OT efforts.                             Therapy procedure time and total treatment time can be found documented on the Time Entry flowsheet   I certify as stated above.

## 2024-10-12 NOTE — PATIENT PROFILE ADULT - ABILITY TO HEAR (WITH HEARING AID OR HEARING APPLIANCE IF NORMALLY USED):
Procedure:  COLONOSCOPY    Relevant Problems   NEURO/PSYCH   (+) Anxiety      PULMONARY   (+) Shortness of breath      Eczema    Anxiety    Varicella    History of D&C      Physical Exam    Airway    Mallampati score: II  TM Distance: >3 FB  Neck ROM: full     Dental       Cardiovascular  Cardiovascular exam normal    Pulmonary  Pulmonary exam normal     Other Findings  post-pubertal.      Anesthesia Plan  ASA Score- 2     Anesthesia Type- IV sedation with anesthesia with ASA Monitors.         Additional Monitors:     Airway Plan:            Plan Factors-Exercise tolerance (METS): >4 METS.    Chart reviewed. EKG reviewed. Imaging results reviewed. Existing labs reviewed. Patient summary reviewed.                  Induction- intravenous.    Postoperative Plan-         Informed Consent- Anesthetic plan and risks discussed with patient.  I personally reviewed this patient with the CRNA. Discussed and agreed on the Anesthesia Plan with the CRNA..         Adequate: hears normal conversation without difficulty